# Patient Record
Sex: FEMALE | Race: WHITE | NOT HISPANIC OR LATINO | Employment: OTHER | URBAN - METROPOLITAN AREA
[De-identification: names, ages, dates, MRNs, and addresses within clinical notes are randomized per-mention and may not be internally consistent; named-entity substitution may affect disease eponyms.]

---

## 2017-12-21 ENCOUNTER — HOSPITAL ENCOUNTER (OUTPATIENT)
Dept: RADIOLOGY | Age: 67
Discharge: HOME | End: 2017-12-21
Payer: MEDICARE

## 2017-12-21 ENCOUNTER — GENERIC CONVERSION - ENCOUNTER (OUTPATIENT)
Dept: OTHER | Facility: OTHER | Age: 67
End: 2017-12-21

## 2017-12-21 DIAGNOSIS — Z12.31 ENCOUNTER FOR SCREENING MAMMOGRAM FOR MALIGNANT NEOPLASM OF BREAST: ICD-10-CM

## 2017-12-21 PROCEDURE — G0202 SCR MAMMO BI INCL CAD: HCPCS

## 2018-11-30 ENCOUNTER — LAB REQUISITION (OUTPATIENT)
Dept: LAB | Facility: HOSPITAL | Age: 68
End: 2018-11-30
Payer: MEDICARE

## 2018-11-30 DIAGNOSIS — N20.9 URINARY CALCULUS: ICD-10-CM

## 2018-11-30 PROCEDURE — 82360 CALCULUS ASSAY QUANT: CPT | Performed by: UROLOGY

## 2018-11-30 PROCEDURE — 88300 SURGICAL PATH GROSS: CPT | Performed by: PATHOLOGY

## 2018-12-12 LAB
CA PHOS MFR STONE: 15 %
CALCIUM OXALATE DIHYDRATE MFR STONE IR: 25 %
COLOR STONE: NORMAL
COM MFR STONE: 60 %
COMMENT-STONE3: NORMAL
COMPOSITION: NORMAL
LABORATORY COMMENT REPORT: NORMAL
NIDUS STONE QL: NORMAL
PHOTO: NORMAL
SIZE STONE: NORMAL MM
STONE ANALYSIS-IMP: NORMAL
WT STONE: 101.8 MG

## 2019-01-03 ENCOUNTER — HOSPITAL ENCOUNTER (OUTPATIENT)
Dept: RADIOLOGY | Age: 69
Discharge: HOME | End: 2019-01-03
Payer: MEDICARE

## 2019-01-03 VITALS — WEIGHT: 150 LBS | HEIGHT: 63 IN | BODY MASS INDEX: 26.58 KG/M2

## 2019-01-03 DIAGNOSIS — Z12.31 ENCOUNTER FOR SCREENING MAMMOGRAM FOR MALIGNANT NEOPLASM OF BREAST: ICD-10-CM

## 2019-01-03 PROCEDURE — 77067 SCR MAMMO BI INCL CAD: CPT

## 2019-07-09 RX ORDER — LANOLIN ALCOHOL/MO/W.PET/CERES
1 CREAM (GRAM) TOPICAL
COMMUNITY

## 2019-07-09 RX ORDER — DIPHENOXYLATE HYDROCHLORIDE AND ATROPINE SULFATE 2.5; .025 MG/1; MG/1
1 TABLET ORAL
COMMUNITY

## 2019-07-09 RX ORDER — CYCLOSPORINE 0.5 MG/ML
1 EMULSION OPHTHALMIC
COMMUNITY

## 2019-08-07 ENCOUNTER — OFFICE VISIT (OUTPATIENT)
Dept: OBGYN CLINIC | Facility: CLINIC | Age: 69
End: 2019-08-07
Payer: MEDICARE

## 2019-08-07 VITALS
DIASTOLIC BLOOD PRESSURE: 70 MMHG | WEIGHT: 154 LBS | SYSTOLIC BLOOD PRESSURE: 142 MMHG | BODY MASS INDEX: 28.34 KG/M2 | HEIGHT: 62 IN

## 2019-08-07 DIAGNOSIS — Z01.419 ENCOUNTER FOR GYNECOLOGICAL EXAMINATION (GENERAL) (ROUTINE) WITHOUT ABNORMAL FINDINGS: ICD-10-CM

## 2019-08-07 DIAGNOSIS — Z12.31 ENCOUNTER FOR SCREENING MAMMOGRAM FOR MALIGNANT NEOPLASM OF BREAST: ICD-10-CM

## 2019-08-07 DIAGNOSIS — Z91.89 GYN EXAM FOR HIGH-RISK MEDICARE PATIENT: Primary | ICD-10-CM

## 2019-08-07 DIAGNOSIS — C54.1 ENDOMETRIAL CANCER (HCC): ICD-10-CM

## 2019-08-07 PROCEDURE — G0101 CA SCREEN;PELVIC/BREAST EXAM: HCPCS | Performed by: OBSTETRICS & GYNECOLOGY

## 2019-08-07 RX ORDER — HYDROCORTISONE 1 %
2000 OINTMENT (GRAM) TOPICAL DAILY
COMMUNITY

## 2019-08-07 RX ORDER — MOMETASONE FUROATE 50 UG/1
SPRAY, METERED NASAL
Refills: 3 | COMMUNITY
Start: 2019-05-15

## 2019-08-07 NOTE — PROGRESS NOTES
Assessment/Plan:    Encounter for gynecological examination (general) (routine) without abnormal findings   -  Pap no longer indicated    Encounter for screening mammogram for malignant neoplasm of breast  -     Mammo screening bilateral w cad; Future    Osteopenia  -   Reviewed calcium and Vit D dosing as well as weight bearing exercise  Advise follow up 2y from last scan    Subjective:      Patient ID: Tawana Darby is a 70 y o  female  68yo here for annual exam   Last seen at [facility] in 2018  Personal history of endometrial cancer - treated by Dr Mara Elizondo with Hysterectomy and BSO  She was cleared for routine gynecologic care and follow  Stage 1A, grade 1  History of abnormal pap - follows up normal, and per [facility] notes no further indicated at this time  She is not having any bleeding or vaginal discharge  She has no pelvic pain  She is not sexually active  She has no urinary concerns  She has no bowel concerns  She has no breast concerns  She has been treated for kidney stones in this past year  Had stents placed  Two children, grandson and granddaughter  The following portions of the patient's history were reviewed and updated as appropriate: allergies, current medications, past family history, past medical history, past social history, past surgical history and problem list     Review of Systems   Gastrointestinal: Negative for abdominal pain, constipation and diarrhea  Genitourinary: Negative for difficulty urinating, dysuria, pelvic pain, vaginal bleeding, vaginal discharge and vaginal pain  Objective:      /70 (BP Location: Left arm, Patient Position: Sitting, Cuff Size: Large)   Ht 5' 2" (1 575 m)   Wt 69 9 kg (154 lb)   LMP  (LMP Unknown)   BMI 28 17 kg/m²          Physical Exam   Constitutional: She appears well-developed and well-nourished  No distress  Pulmonary/Chest: No respiratory distress   Right breast exhibits no inverted nipple, no mass, no nipple discharge, no skin change and no tenderness  Left breast exhibits no inverted nipple, no mass, no nipple discharge, no skin change and no tenderness  Abdominal: Soft  There is no tenderness  Genitourinary: Vagina normal  There is no rash or lesion on the right labia  There is no rash or lesion on the left labia  Genitourinary Comments: Cervix:  Surgically Absent  Uterus:  Surgically Absent  Adnexa:  Surgically Absent    No pelvic masses appreciated   Neurological: She is alert  Skin: Skin is warm and dry  Psychiatric: She has a normal mood and affect  Vitals reviewed

## 2019-12-28 ENCOUNTER — HOSPITAL ENCOUNTER (EMERGENCY)
Facility: HOSPITAL | Age: 69
Discharge: HOME | End: 2019-12-28
Attending: EMERGENCY MEDICINE
Payer: MEDICARE

## 2019-12-28 VITALS
SYSTOLIC BLOOD PRESSURE: 142 MMHG | HEIGHT: 62 IN | OXYGEN SATURATION: 96 % | BODY MASS INDEX: 27.6 KG/M2 | HEART RATE: 83 BPM | DIASTOLIC BLOOD PRESSURE: 84 MMHG | RESPIRATION RATE: 20 BRPM | TEMPERATURE: 98.4 F | WEIGHT: 150 LBS

## 2019-12-28 DIAGNOSIS — M62.838 TRAPEZIUS MUSCLE SPASM: ICD-10-CM

## 2019-12-28 DIAGNOSIS — S16.1XXA ACUTE STRAIN OF NECK MUSCLE, INITIAL ENCOUNTER: Primary | ICD-10-CM

## 2019-12-28 DIAGNOSIS — J06.9 UPPER RESPIRATORY TRACT INFECTION, UNSPECIFIED TYPE: ICD-10-CM

## 2019-12-28 PROCEDURE — 99284 EMERGENCY DEPT VISIT MOD MDM: CPT | Performed by: EMERGENCY MEDICINE

## 2019-12-28 PROCEDURE — 99283 EMERGENCY DEPT VISIT LOW MDM: CPT

## 2019-12-28 RX ORDER — DIAZEPAM 5 MG/1
5 TABLET ORAL EVERY 8 HOURS PRN
Qty: 15 TABLET | Refills: 0 | Status: SHIPPED | OUTPATIENT
Start: 2019-12-28 | End: 2020-09-03 | Stop reason: ALTCHOICE

## 2019-12-28 RX ORDER — NAPROXEN 500 MG/1
500 TABLET ORAL 2 TIMES DAILY PRN
Qty: 20 TABLET | Refills: 0 | Status: SHIPPED | OUTPATIENT
Start: 2019-12-28 | End: 2020-09-03 | Stop reason: ALTCHOICE

## 2019-12-28 NOTE — ED PROVIDER NOTES
History  Chief Complaint   Patient presents with    Neck Pain     Pt  presents to the ED with complaints of a stiff neck since before Christmas as well as sinus congestion  Pt  has had worsening symptoms  40-year-old female, 2 separate complaints, right-sided neck pain, URI symptoms  URI symptoms have been for 3 days, neck pain for 2 days  , was called, states her arms were held up high on a table when she was in the low chair, this was an on position for her she did this for multiple hours  , later that night noticed a soreness on the right side of her neck and her upper arms  , this significantly worsened over the next couple of days  , the setting of also having a URI patient was concerned about meningitis  , this is but mainly prompted the ED visit  , patient scratch neck pain as dull, constant, worse with rotation to the right, no worsening with flexion or extension, no light sensitivity no headache , no other modifying or alleviating factors  Has not tried any over-the-counter remedies  As per the URI could patient describes nasal congestion a mild sore throat, has been for past 3 days, was worse for the 1st 2 days, much better today  , never had any fevers  For this she has been using DayQuil and NyQuil  Did not use any DayQuil today, last use NyQuil last night          Prior to Admission Medications   Prescriptions Last Dose Informant Patient Reported? Taking?    Calcium Citrate-Vitamin D (CALCIUM CITRATE + D PO)   Yes No   Sig: Take by mouth   Omega-3 Fatty Acids (FISH OIL PO)   Yes No   Sig: Take 2 g by mouth   SM VITAMIN D3 4000 units CAPS  Self Yes No   Sig: Take by mouth   cycloSPORINE (RESTASIS) 0 05 % ophthalmic emulsion   Yes No   Sig: Apply 1 drop to eye   glucosamine-chondroitin 500-400 MG tablet   Yes No   Sig: Take 1 tablet by mouth   mometasone (NASONEX) 50 mcg/act nasal spray   Yes No   Sig: SHAKE LQ AND U 2 SPRAYS IEN D   multivitamin (THERAGRAN) TABS   Yes No   Sig: Take 1 tablet by mouth Facility-Administered Medications: None       Past Medical History:   Diagnosis Date    Achilles tendon tear ,2018    bilateral    Anxiety     Depression     HPV (human papilloma virus) infection     Kidney stones     renal calculi    Osteoporosis     Uterine cancer ([address] ) 2014    UTI (urinary tract infection)        Past Surgical History:   Procedure Laterality Date     SECTION  1002,1318    HYSTERECTOMY  2014    OOPHORECTOMY Bilateral 2014    TENDON REPAIR  7524,4548    Archilles tendon repair-bilateral       Family History   Adopted: Yes   Problem Relation Age of Onset    Breast cancer Daughter 39    Parkinsonism Other     Seizures Other      I have reviewed and agree with the history as documented  Social History     Tobacco Use    Smoking status: Never Smoker    Smokeless tobacco: Never Used   Substance Use Topics    Alcohol use: Yes     Alcohol/week: 2 0 standard drinks     Types: 1 Glasses of wine, 1 Cans of beer per week     Frequency: Monthly or less     Binge frequency: Less than monthly    Drug use: Never        Review of Systems   Constitutional: Negative for activity change, chills, diaphoresis and fever  HENT: Positive for congestion, postnasal drip and sore throat  Negative for sinus pressure  Eyes: Negative for pain and visual disturbance  Respiratory: Negative for cough, chest tightness, shortness of breath, wheezing and stridor  Cardiovascular: Negative for chest pain and palpitations  Gastrointestinal: Negative for abdominal distention, abdominal pain, constipation, diarrhea, nausea and vomiting  Genitourinary: Negative for dysuria and frequency  Musculoskeletal: Negative for neck pain and neck stiffness  Skin: Negative for rash  Neurological: Negative for dizziness, speech difficulty, light-headedness, numbness and headaches  Physical Exam  Physical Exam   Constitutional: She is oriented to person, place, and time   She appears well-developed  No distress  HENT:   Head: Normocephalic and atraumatic  TMs clear bilaterally mild nasal congestion, no posterior pharyngeal erythema no evidence peritonsillar abscess   Eyes: Pupils are equal, round, and reactive to light  Neck: Normal range of motion  Neck supple  No tracheal deviation present  No signs of meningismus slight decreased range of motion with rotation to the right  No trouble flexion extension   Cardiovascular: Normal rate, regular rhythm, normal heart sounds and intact distal pulses  No murmur heard  Pulmonary/Chest: Effort normal and breath sounds normal  No stridor  No respiratory distress  Abdominal: Soft  She exhibits no distension  There is no tenderness  There is no rebound and no guarding  Musculoskeletal: Normal range of motion  Neurological: She is alert and oriented to person, place, and time  Skin: Skin is warm and dry  She is not diaphoretic  No erythema  No pallor  Psychiatric: She has a normal mood and affect  Vitals reviewed        Vital Signs  ED Triage Vitals [12/28/19 1311]   Temperature Pulse Respirations Blood Pressure SpO2   98 4 °F (36 9 °C) 83 20 142/84 96 %      Temp Source Heart Rate Source Patient Position - Orthostatic VS BP Location FiO2 (%)   Oral Monitor Sitting Left arm --      Pain Score       6           Vitals:    12/28/19 1311   BP: 142/84   Pulse: 83   Patient Position - Orthostatic VS: Sitting         Visual Acuity      ED Medications  Medications - No data to display    Diagnostic Studies  Results Reviewed     None                 No orders to display              Procedures  Procedures         ED Course                               MDM  Number of Diagnoses or Management Options  Acute strain of neck muscle, initial encounter: new and requires workup  Trapezius muscle spasm: new and requires workup  Upper respiratory tract infection, unspecified type: new and requires workup  Diagnosis management comments: Patient muscular neck pain likely from holding her arms in a overly extended position for a long period distal time wa quilting , in the setting of also having a URI  I do not believe this represents meningitis  Will treat with Naprosyn and Valium       Amount and/or Complexity of Data Reviewed  Review and summarize past medical records: yes          Disposition  Final diagnoses:   Trapezius muscle spasm   Acute strain of neck muscle, initial encounter   Upper respiratory tract infection, unspecified type     Time reflects when diagnosis was documented in both MDM as applicable and the Disposition within this note     Time User Action Codes Description Comment    12/28/2019  2:08 PM Jorge Doe Add [W26 635] Trapezius muscle spasm     12/28/2019  2:08 PM Jorge Doe Add Leif Cisco  1XXA] Acute strain of neck muscle, initial encounter     12/28/2019  2:08 PM Yanique MCKEON Add [J06 9] Upper respiratory tract infection, unspecified type     12/28/2019  2:09 PM Jorge Doe Modify [Y90 491] Trapezius muscle spasm     12/28/2019  2:09 PM Jorge Doe Modify [D24  1XXA] Acute strain of neck muscle, initial encounter       ED Disposition     ED Disposition Condition Date/Time Comment    Discharge Stable Sat Dec 28, 2019  2:08 PM Teri Marina discharge to home/self care              Follow-up Information    None         Discharge Medication List as of 12/28/2019  2:09 PM      START taking these medications    Details   diazepam (VALIUM) 5 mg tablet Take 1 tablet (5 mg total) by mouth every 8 (eight) hours as needed for muscle spasms for up to 10 days, Starting Sat 12/28/2019, Until Tue 1/7/2020, Normal      naproxen (NAPROSYN) 500 mg tablet Take 1 tablet (500 mg total) by mouth 2 (two) times a day as needed for mild pain, Starting Sat 12/28/2019, Normal         CONTINUE these medications which have NOT CHANGED    Details   Calcium Citrate-Vitamin D (CALCIUM CITRATE + D PO) Take by mouth, Historical Med      cycloSPORINE (RESTASIS) 0 05 % ophthalmic emulsion Apply 1 drop to eye, Historical Med      glucosamine-chondroitin 500-400 MG tablet Take 1 tablet by mouth, Historical Med      mometasone (NASONEX) 50 mcg/act nasal spray SHAKE LQ AND U 2 SPRAYS IEN D, Historical Med      multivitamin (THERAGRAN) TABS Take 1 tablet by mouth, Historical Med      Omega-3 Fatty Acids (FISH OIL PO) Take 2 g by mouth, Historical Med      SM VITAMIN D3 4000 units CAPS Take by mouth, Historical Med           No discharge procedures on file      ED Provider  Electronically Signed by           Rishabh Long DO  12/28/19 6725

## 2020-02-28 ENCOUNTER — TELEPHONE (OUTPATIENT)
Dept: OBGYN CLINIC | Facility: CLINIC | Age: 70
End: 2020-02-28

## 2020-02-28 NOTE — TELEPHONE ENCOUNTER
Spoke to pt reminding her to call and set up her mammo  She states she will do next week as she is out of town right now

## 2020-06-12 ENCOUNTER — TRANSCRIBE ORDERS (OUTPATIENT)
Dept: ADMINISTRATIVE | Facility: HOSPITAL | Age: 70
End: 2020-06-12

## 2020-06-12 DIAGNOSIS — Z12.31 VISIT FOR SCREENING MAMMOGRAM: Primary | ICD-10-CM

## 2020-07-01 ENCOUNTER — HOSPITAL ENCOUNTER (OUTPATIENT)
Dept: MAMMOGRAPHY | Facility: HOSPITAL | Age: 70
Discharge: HOME | End: 2020-07-01
Payer: MEDICARE

## 2020-07-01 ENCOUNTER — TELEPHONE (OUTPATIENT)
Dept: OBGYN CLINIC | Facility: CLINIC | Age: 70
End: 2020-07-01

## 2020-07-01 VITALS — HEIGHT: 62 IN | BODY MASS INDEX: 27.6 KG/M2 | WEIGHT: 150 LBS

## 2020-07-01 DIAGNOSIS — M85.80 OSTEOPENIA, UNSPECIFIED LOCATION: Primary | ICD-10-CM

## 2020-07-01 DIAGNOSIS — Z12.31 VISIT FOR SCREENING MAMMOGRAM: ICD-10-CM

## 2020-07-01 PROCEDURE — 77067 SCR MAMMO BI INCL CAD: CPT

## 2020-07-01 PROCEDURE — 77063 BREAST TOMOSYNTHESIS BI: CPT

## 2020-07-01 NOTE — TELEPHONE ENCOUNTER
Patient had a osteopenia screening in 2018 with [facility] and wanted to know should she go again this year

## 2020-07-02 NOTE — TELEPHONE ENCOUNTER
Patient insisted on having dexa order placed today so she can go in August  Talked to Dr Felisha torres okay with order being placed  Patient aware

## 2020-08-07 ENCOUNTER — HOSPITAL ENCOUNTER (OUTPATIENT)
Dept: BONE DENSITY | Facility: HOSPITAL | Age: 70
Discharge: HOME | End: 2020-08-07
Payer: MEDICARE

## 2020-08-07 DIAGNOSIS — M85.80 OSTEOPENIA, UNSPECIFIED LOCATION: ICD-10-CM

## 2020-08-07 PROCEDURE — 77080 DXA BONE DENSITY AXIAL: CPT

## 2020-09-03 ENCOUNTER — ANNUAL EXAM (OUTPATIENT)
Dept: OBGYN CLINIC | Facility: CLINIC | Age: 70
End: 2020-09-03
Payer: MEDICARE

## 2020-09-03 VITALS
TEMPERATURE: 97.8 F | SYSTOLIC BLOOD PRESSURE: 142 MMHG | WEIGHT: 151.2 LBS | BODY MASS INDEX: 26.79 KG/M2 | DIASTOLIC BLOOD PRESSURE: 72 MMHG | HEIGHT: 63 IN

## 2020-09-03 DIAGNOSIS — Z01.419 ENCOUNTER FOR GYNECOLOGICAL EXAMINATION (GENERAL) (ROUTINE) WITHOUT ABNORMAL FINDINGS: ICD-10-CM

## 2020-09-03 DIAGNOSIS — Z12.31 ENCOUNTER FOR SCREENING MAMMOGRAM FOR MALIGNANT NEOPLASM OF BREAST: ICD-10-CM

## 2020-09-03 DIAGNOSIS — Z91.89 GYN EXAM FOR HIGH-RISK MEDICARE PATIENT: Primary | ICD-10-CM

## 2020-09-03 PROCEDURE — G0101 CA SCREEN;PELVIC/BREAST EXAM: HCPCS | Performed by: OBSTETRICS & GYNECOLOGY

## 2020-09-03 RX ORDER — CARBOXYMETHYLCELLULOSE SODIUM 5 MG/ML
1 SOLUTION/ DROPS OPHTHALMIC 3 TIMES DAILY PRN
COMMUNITY

## 2020-09-03 RX ORDER — BRIMONIDINE TARTRATE 0.1 %
DROPS OPHTHALMIC (EYE)
COMMUNITY
Start: 2020-07-30 | End: 2022-06-23

## 2020-09-03 NOTE — PROGRESS NOTES
Ivis Mary   1950    CC:  Yearly exam    S:  78 y o  female here for yearly exam  She is s/p hysterectomy for endometrial cancer  She is doing well and has no concerns today  She denies vaginal bleeding  She denies vaginal discharge, itching, odor or dryness  She is not sexually active without pain, bleeding or dryness      Last Pap 7/11/18 - Negative   Last Mammo 7/1/20 - BIRad 1  Last Colonoscopy 1/2/16 - Polyp, 5yr recall   Last DEXA 8/7/20 - osteopenia    Family hx of breast cancer:  Daughter  Family hx of ovarian cancer: none known  Family hx of colon cancer:  None known      Current Outpatient Medications:     Alphagan P 0 1 %, INT 1 GTT IN OS BID, Disp: , Rfl:     Calcium Citrate-Vitamin D (CALCIUM CITRATE + D PO), Take by mouth, Disp: , Rfl:     carboxymethylcellulose 0 5 % SOLN, 1 drop 3 (three) times a day as needed for dry eyes, Disp: , Rfl:     cycloSPORINE (RESTASIS) 0 05 % ophthalmic emulsion, Apply 1 drop to eye, Disp: , Rfl:     glucosamine-chondroitin 500-400 MG tablet, Take 1 tablet by mouth, Disp: , Rfl:     multivitamin (THERAGRAN) TABS, Take 1 tablet by mouth, Disp: , Rfl:     Omega-3 Fatty Acids (FISH OIL PO), Take 2 g by mouth, Disp: , Rfl:     SM VITAMIN D3 4000 units CAPS, Take by mouth, Disp: , Rfl:     mometasone (NASONEX) 50 mcg/act nasal spray, SHAKE LQ AND U 2 SPRAYS IEN D, Disp: , Rfl: 3  Social History     Socioeconomic History    Marital status: /Civil Union     Spouse name: Not on file    Number of children: Not on file    Years of education: Not on file    Highest education level: Not on file   Occupational History    Not on file   Social Needs    Financial resource strain: Not on file    Food insecurity     Worry: Not on file     Inability: Not on file    Transportation needs     Medical: Not on file     Non-medical: Not on file   Tobacco Use    Smoking status: Never Smoker    Smokeless tobacco: Never Used   Substance and Sexual Activity    Alcohol use: Yes     Alcohol/week: 2 0 standard drinks     Types: 1 Glasses of wine, 1 Cans of beer per week     Frequency: Monthly or less     Binge frequency: Less than monthly    Drug use: Never    Sexual activity: Not Currently     Partners: Male     Birth control/protection: Post-menopausal   Lifestyle    Physical activity     Days per week: Not on file     Minutes per session: Not on file    Stress: Not on file   Relationships    Social connections     Talks on phone: Not on file     Gets together: Not on file     Attends Druze service: Not on file     Active member of club or organization: Not on file     Attends meetings of clubs or organizations: Not on file     Relationship status: Not on file    Intimate partner violence     Fear of current or ex partner: Not on file     Emotionally abused: Not on file     Physically abused: Not on file     Forced sexual activity: Not on file   Other Topics Concern    Not on file   Social History Narrative    Not on file     Family History   Adopted: Yes   Problem Relation Age of Onset    Breast cancer Daughter 39    Parkinsonism Other     Seizures Other      Past Medical History:   Diagnosis Date    Achilles tendon tear 2014,2018    bilateral    Anxiety     Depression     HPV (human papilloma virus) infection     Kidney stones     renal calculi    Osteoporosis     [facility]    UTI (urinary tract infection)         Review of Systems   Respiratory: Negative  Cardiovascular: Negative  Gastrointestinal: Negative for constipation and diarrhea  Genitourinary: Negative for difficulty urinating, pelvic pain, vaginal bleeding, vaginal discharge, itching, or odor  O:  Blood pressure 142/72, temperature 97 8 °F (36 6 °C), temperature source Tympanic, height 5' 2 5" (1 588 m), weight 68 6 kg (151 lb 3 2 oz)      Patient appears well and is not in distress  Breasts are symmetrical without mass, tenderness, nipple discharge, skin changes or adenopathy  Abdomen is soft and nontender without masses  External genitals are normal without lesions or rashes  Urethral meatus and urethra are normal  Vagina is normal without discharge or bleeding  Cervix and uterus are surgically absent  Adnexa are surgically absent    A:   Yearly exam      P:   Mammo slip provided   RTO one year for yearly exam or sooner as needed

## 2021-03-09 DIAGNOSIS — Z23 ENCOUNTER FOR IMMUNIZATION: ICD-10-CM

## 2021-03-10 ENCOUNTER — IMMUNIZATIONS (OUTPATIENT)
Dept: FAMILY MEDICINE CLINIC | Facility: HOSPITAL | Age: 71
End: 2021-03-10

## 2021-03-10 DIAGNOSIS — Z23 ENCOUNTER FOR IMMUNIZATION: Primary | ICD-10-CM

## 2021-03-10 PROCEDURE — 0011A SARS-COV-2 / COVID-19 MRNA VACCINE (MODERNA) 100 MCG: CPT

## 2021-03-10 PROCEDURE — 91301 SARS-COV-2 / COVID-19 MRNA VACCINE (MODERNA) 100 MCG: CPT

## 2021-04-07 ENCOUNTER — IMMUNIZATIONS (OUTPATIENT)
Dept: FAMILY MEDICINE CLINIC | Facility: HOSPITAL | Age: 71
End: 2021-04-07

## 2021-04-07 DIAGNOSIS — Z23 ENCOUNTER FOR IMMUNIZATION: Primary | ICD-10-CM

## 2021-04-07 PROCEDURE — 91301 SARS-COV-2 / COVID-19 MRNA VACCINE (MODERNA) 100 MCG: CPT

## 2021-04-07 PROCEDURE — 0012A SARS-COV-2 / COVID-19 MRNA VACCINE (MODERNA) 100 MCG: CPT

## 2021-05-13 ENCOUNTER — APPOINTMENT (OUTPATIENT)
Dept: LAB | Facility: MEDICAL CENTER | Age: 71
End: 2021-05-13
Payer: MEDICARE

## 2021-05-13 ENCOUNTER — TRANSCRIBE ORDERS (OUTPATIENT)
Dept: LAB | Facility: MEDICAL CENTER | Age: 71
End: 2021-05-13

## 2021-05-13 DIAGNOSIS — I10 ESSENTIAL HYPERTENSION, MALIGNANT: Primary | ICD-10-CM

## 2021-05-13 DIAGNOSIS — I10 ESSENTIAL HYPERTENSION, MALIGNANT: ICD-10-CM

## 2021-05-13 DIAGNOSIS — E78.2 MIXED HYPERLIPIDEMIA: ICD-10-CM

## 2021-05-13 DIAGNOSIS — I51.9 MYXEDEMA HEART DISEASE: ICD-10-CM

## 2021-05-13 DIAGNOSIS — R35.0 URINARY FREQUENCY: ICD-10-CM

## 2021-05-13 DIAGNOSIS — E03.9 MYXEDEMA HEART DISEASE: ICD-10-CM

## 2021-05-13 DIAGNOSIS — D64.9 ANEMIA, UNSPECIFIED TYPE: ICD-10-CM

## 2021-05-13 LAB
ALBUMIN SERPL BCP-MCNC: 3.7 G/DL (ref 3.5–5)
ALP SERPL-CCNC: 86 U/L (ref 46–116)
ALT SERPL W P-5'-P-CCNC: 34 U/L (ref 12–78)
ANION GAP SERPL CALCULATED.3IONS-SCNC: 4 MMOL/L (ref 4–13)
AST SERPL W P-5'-P-CCNC: 18 U/L (ref 5–45)
BASOPHILS # BLD AUTO: 0.03 THOUSANDS/ΜL (ref 0–0.1)
BASOPHILS NFR BLD AUTO: 1 % (ref 0–1)
BILIRUB SERPL-MCNC: 0.79 MG/DL (ref 0.2–1)
BILIRUB UR QL STRIP: NEGATIVE
BUN SERPL-MCNC: 17 MG/DL (ref 5–25)
CALCIUM SERPL-MCNC: 9.1 MG/DL (ref 8.3–10.1)
CHLORIDE SERPL-SCNC: 106 MMOL/L (ref 100–108)
CHOLEST SERPL-MCNC: 233 MG/DL (ref 50–200)
CLARITY UR: CLEAR
CO2 SERPL-SCNC: 29 MMOL/L (ref 21–32)
COLOR UR: YELLOW
CREAT SERPL-MCNC: 0.56 MG/DL (ref 0.6–1.3)
EOSINOPHIL # BLD AUTO: 0.05 THOUSAND/ΜL (ref 0–0.61)
EOSINOPHIL NFR BLD AUTO: 1 % (ref 0–6)
ERYTHROCYTE [DISTWIDTH] IN BLOOD BY AUTOMATED COUNT: 13.4 % (ref 11.6–15.1)
GFR SERPL CREATININE-BSD FRML MDRD: 94 ML/MIN/1.73SQ M
GLUCOSE P FAST SERPL-MCNC: 98 MG/DL (ref 65–99)
GLUCOSE UR STRIP-MCNC: NEGATIVE MG/DL
HCT VFR BLD AUTO: 42.9 % (ref 34.8–46.1)
HDLC SERPL-MCNC: 76 MG/DL
HGB BLD-MCNC: 14 G/DL (ref 11.5–15.4)
HGB UR QL STRIP.AUTO: NEGATIVE
IMM GRANULOCYTES # BLD AUTO: 0.01 THOUSAND/UL (ref 0–0.2)
IMM GRANULOCYTES NFR BLD AUTO: 0 % (ref 0–2)
KETONES UR STRIP-MCNC: NEGATIVE MG/DL
LDLC SERPL CALC-MCNC: 141 MG/DL (ref 0–100)
LDLC SERPL DIRECT ASSAY-MCNC: 144 MG/DL (ref 0–100)
LEUKOCYTE ESTERASE UR QL STRIP: NEGATIVE
LYMPHOCYTES # BLD AUTO: 1.87 THOUSANDS/ΜL (ref 0.6–4.47)
LYMPHOCYTES NFR BLD AUTO: 37 % (ref 14–44)
MCH RBC QN AUTO: 28.9 PG (ref 26.8–34.3)
MCHC RBC AUTO-ENTMCNC: 32.6 G/DL (ref 31.4–37.4)
MCV RBC AUTO: 89 FL (ref 82–98)
MONOCYTES # BLD AUTO: 0.4 THOUSAND/ΜL (ref 0.17–1.22)
MONOCYTES NFR BLD AUTO: 8 % (ref 4–12)
NEUTROPHILS # BLD AUTO: 2.71 THOUSANDS/ΜL (ref 1.85–7.62)
NEUTS SEG NFR BLD AUTO: 53 % (ref 43–75)
NITRITE UR QL STRIP: NEGATIVE
NONHDLC SERPL-MCNC: 157 MG/DL
NRBC BLD AUTO-RTO: 0 /100 WBCS
PH UR STRIP.AUTO: 6.5 [PH]
PLATELET # BLD AUTO: 272 THOUSANDS/UL (ref 149–390)
PMV BLD AUTO: 9.3 FL (ref 8.9–12.7)
POTASSIUM SERPL-SCNC: 4.3 MMOL/L (ref 3.5–5.3)
PROT SERPL-MCNC: 6.9 G/DL (ref 6.4–8.2)
PROT UR STRIP-MCNC: NEGATIVE MG/DL
RBC # BLD AUTO: 4.85 MILLION/UL (ref 3.81–5.12)
SODIUM SERPL-SCNC: 139 MMOL/L (ref 136–145)
SP GR UR STRIP.AUTO: 1.02 (ref 1–1.03)
T4 FREE SERPL-MCNC: 1.11 NG/DL (ref 0.76–1.46)
TRIGL SERPL-MCNC: 79 MG/DL
TSH SERPL DL<=0.05 MIU/L-ACNC: 1.29 UIU/ML (ref 0.36–3.74)
UROBILINOGEN UR QL STRIP.AUTO: 0.2 E.U./DL
WBC # BLD AUTO: 5.07 THOUSAND/UL (ref 4.31–10.16)

## 2021-05-13 PROCEDURE — 85025 COMPLETE CBC W/AUTO DIFF WBC: CPT

## 2021-05-13 PROCEDURE — 81003 URINALYSIS AUTO W/O SCOPE: CPT

## 2021-05-13 PROCEDURE — 84439 ASSAY OF FREE THYROXINE: CPT

## 2021-05-13 PROCEDURE — 80053 COMPREHEN METABOLIC PANEL: CPT

## 2021-05-13 PROCEDURE — 80061 LIPID PANEL: CPT

## 2021-05-13 PROCEDURE — 84443 ASSAY THYROID STIM HORMONE: CPT

## 2021-05-13 PROCEDURE — 83721 ASSAY OF BLOOD LIPOPROTEIN: CPT

## 2021-05-13 PROCEDURE — 36415 COLL VENOUS BLD VENIPUNCTURE: CPT

## 2021-06-02 DIAGNOSIS — E78.5 HYPERLIPIDEMIA, UNSPECIFIED HYPERLIPIDEMIA TYPE: Primary | ICD-10-CM

## 2021-06-02 NOTE — TELEPHONE ENCOUNTER
Patient was supposed to take a Low dose statin that was suppose to be sent to go to [facility] in May and was never sent?  Patient due to have lab work and an appt in July to follow up on her statin

## 2021-06-08 RX ORDER — ATORVASTATIN CALCIUM 10 MG/1
10 TABLET, FILM COATED ORAL DAILY
Qty: 90 TABLET | Refills: 1 | Status: SHIPPED | OUTPATIENT
Start: 2021-06-08 | End: 2021-09-13 | Stop reason: SDUPTHER

## 2021-07-20 ENCOUNTER — OFFICE VISIT (OUTPATIENT)
Dept: FAMILY MEDICINE CLINIC | Facility: CLINIC | Age: 71
End: 2021-07-20
Payer: MEDICARE

## 2021-07-20 VITALS
BODY MASS INDEX: 27.11 KG/M2 | DIASTOLIC BLOOD PRESSURE: 78 MMHG | TEMPERATURE: 96 F | SYSTOLIC BLOOD PRESSURE: 148 MMHG | HEIGHT: 63 IN | WEIGHT: 153 LBS

## 2021-07-20 DIAGNOSIS — Z76.89 ENCOUNTER TO ESTABLISH CARE: Primary | ICD-10-CM

## 2021-07-20 DIAGNOSIS — H04.123 DRY EYE SYNDROME OF BOTH EYES: ICD-10-CM

## 2021-07-20 DIAGNOSIS — E78.5 HYPERLIPIDEMIA, UNSPECIFIED HYPERLIPIDEMIA TYPE: ICD-10-CM

## 2021-07-20 PROBLEM — N20.0 NEPHROLITHIASIS: Status: ACTIVE | Noted: 2020-12-15

## 2021-07-20 PROCEDURE — 99214 OFFICE O/P EST MOD 30 MIN: CPT | Performed by: PHYSICIAN ASSISTANT

## 2021-07-20 NOTE — PROGRESS NOTES
Assessment/Plan:    Problem List Items Addressed This Visit        Other    Hyperlipidemia    Relevant Orders    Comprehensive metabolic panel    Lipid panel      Other Visit Diagnoses     Encounter to establish care    -  Primary    Dry eye syndrome of both eyes               Diagnoses and all orders for this visit:    Encounter to establish care    Hyperlipidemia, unspecified hyperlipidemia type  -     Comprehensive metabolic panel; Future  -     Lipid panel; Future    Dry eye syndrome of both eyes        Patient will continue to follow with urology, ophthalmology, and OB/GYN as scheduled  She will be due for repeat labs again in September to recheck cholesterol  She is up to date with routine mammograms and vaccines  She is due for repeat colonoscopy and will call to schedule  Subjective:      Patient ID: Frank Ndiaye is a 69 y o  female  Jennifer Jaramillo is a pleasant 69year old female who is here today to establish care  She follows routinely with gynecology and urology  She has a history of kidney stones  She is also following with ophthalmology for dry eye syndrome  She had her routine labs completed in May, which showed high cholesterol  She was started on atorvastatin  She is due to have labs repeated again in September  She is up to date with mammograms  She is due for a colonoscopy due to history of colon polyps  She follows with Dr Junior Elder and will schedule on her own  She notes she is very active and exercises regularly  She denies any other concerns or problems at this time  The following portions of the patient's history were reviewed and updated as appropriate:   She has a past medical history of Achilles tendon tear (3776,0020), Allergic rhinitis due to pollen, Anxiety, Depression, HPV (human papilloma virus) infection, Hyperlipidemia, Kidney stones, Mass of neck (2015), Osteoporosis, Uterine cancer ([facility] [address] ) (2014), and UTI (urinary tract infection)  ,  does not have any pertinent problems on file  ,   has a past surgical history that includes Oophorectomy (Bilateral, ); Tendon repair (1239,2699);  section (3504,2400); Colonoscopy (); Mammo (historical) (); DXA procedure(historical) (); and Hysterectomy (Bilateral, )  ,  family history includes Breast cancer (age of onset: 39) in her daughter; Parkinsonism in her other; Seizures in her other  She was adopted  ,   reports that she has never smoked  She has never used smokeless tobacco  She reports current alcohol use of about 2 0 standard drinks of alcohol per week  She reports that she does not use drugs  ,  is allergic to cefaclor, cephalosporins, and prednisone     Current Outpatient Medications   Medication Sig Dispense Refill    Alphagan P 0 1 % INT 1 GTT IN OS BID      atorvastatin (LIPITOR) 10 mg tablet Take 1 tablet (10 mg total) by mouth daily 90 tablet 1    Calcium Citrate-Vitamin D (CALCIUM CITRATE + D PO) Take by mouth      carboxymethylcellulose 0 5 % SOLN 1 drop 3 (three) times a day as needed for dry eyes      cycloSPORINE (RESTASIS) 0 05 % ophthalmic emulsion Apply 1 drop to eye      glucosamine-chondroitin 500-400 MG tablet Take 1 tablet by mouth      mometasone (NASONEX) 50 mcg/act nasal spray SHAKE LQ AND U 2 SPRAYS IEN D  3    multivitamin (THERAGRAN) TABS Take 1 tablet by mouth      Omega-3 Fatty Acids (FISH OIL PO) Take 2 g by mouth      SM VITAMIN D3 4000 units CAPS Take 2,000 Units by mouth daily        No current facility-administered medications for this visit  Review of Systems   Constitutional: Negative for chills, diaphoresis, fatigue and fever  HENT: Negative for congestion, ear pain, postnasal drip, rhinorrhea, sneezing, sore throat and trouble swallowing  Eyes: Negative for pain and visual disturbance  Respiratory: Negative for apnea, cough, shortness of breath and wheezing  Cardiovascular: Negative for chest pain and palpitations     Gastrointestinal: Negative for abdominal pain, constipation, diarrhea, nausea and vomiting  Genitourinary: Negative for dysuria and hematuria  Musculoskeletal: Negative for arthralgias, gait problem and myalgias  Neurological: Negative for dizziness, syncope, weakness, light-headedness, numbness and headaches  Psychiatric/Behavioral: Negative for suicidal ideas  The patient is not nervous/anxious  Objective:  Vitals:    07/20/21 0931   BP: 148/78   BP Location: Left arm   Patient Position: Sitting   Cuff Size: Large   Temp: (!) 96 °F (35 6 °C)   TempSrc: Temporal   Weight: 69 4 kg (153 lb)   Height: 5' 2 5" (1 588 m)     Body mass index is 27 54 kg/m²  Physical Exam  Vitals and nursing note reviewed  Constitutional:       Appearance: She is well-developed  HENT:      Head: Normocephalic and atraumatic  Right Ear: Tympanic membrane, ear canal and external ear normal       Left Ear: Tympanic membrane, ear canal and external ear normal       Nose: Nose normal       Mouth/Throat:      Pharynx: No oropharyngeal exudate or posterior oropharyngeal erythema  Eyes:      Pupils: Pupils are equal, round, and reactive to light  Cardiovascular:      Rate and Rhythm: Normal rate and regular rhythm  Heart sounds: Normal heart sounds  No murmur heard  No friction rub  No gallop  Pulmonary:      Effort: Pulmonary effort is normal  No respiratory distress  Breath sounds: Normal breath sounds  No wheezing or rales  Abdominal:      Palpations: Abdomen is soft  Tenderness: There is no abdominal tenderness  Musculoskeletal:         General: No swelling  Normal range of motion  Cervical back: Normal range of motion and neck supple  Right lower leg: No edema  Left lower leg: No edema  Lymphadenopathy:      Cervical: No cervical adenopathy  Skin:     General: Skin is warm and dry  Neurological:      Mental Status: She is alert and oriented to person, place, and time     Psychiatric: Behavior: Behavior normal          Thought Content: Thought content normal          Judgment: Judgment normal          BMI Counseling: Body mass index is 27 54 kg/m²  The BMI is above normal  Nutrition recommendations include decreasing overall calorie intake and 3-5 servings of fruits/vegetables daily  Exercise recommendations include exercising 3-5 times per week

## 2021-08-13 DIAGNOSIS — Z12.31 ENCOUNTER FOR SCREENING MAMMOGRAM FOR BREAST CANCER: Primary | ICD-10-CM

## 2021-08-17 ENCOUNTER — HOSPITAL ENCOUNTER (OUTPATIENT)
Dept: MAMMOGRAPHY | Facility: HOSPITAL | Age: 71
Discharge: HOME | End: 2021-08-17
Payer: MEDICARE

## 2021-08-17 VITALS — HEIGHT: 63 IN | BODY MASS INDEX: 27.11 KG/M2 | WEIGHT: 153 LBS

## 2021-08-17 DIAGNOSIS — Z12.31 ENCOUNTER FOR SCREENING MAMMOGRAM FOR BREAST CANCER: ICD-10-CM

## 2021-08-17 PROCEDURE — 77063 BREAST TOMOSYNTHESIS BI: CPT

## 2021-08-17 PROCEDURE — 77067 SCR MAMMO BI INCL CAD: CPT

## 2021-08-31 ENCOUNTER — TELEPHONE (OUTPATIENT)
Dept: FAMILY MEDICINE CLINIC | Facility: CLINIC | Age: 71
End: 2021-08-31

## 2021-08-31 NOTE — TELEPHONE ENCOUNTER
Pt was in contact with a positive covid patient this past Saturday  She does not have any symptoms but would like to be tested for covid

## 2021-09-01 DIAGNOSIS — Z20.822 EXPOSURE TO COVID-19 VIRUS: Primary | ICD-10-CM

## 2021-09-01 PROCEDURE — U0005 INFEC AGEN DETEC AMPLI PROBE: HCPCS | Performed by: PHYSICIAN ASSISTANT

## 2021-09-01 PROCEDURE — U0003 INFECTIOUS AGENT DETECTION BY NUCLEIC ACID (DNA OR RNA); SEVERE ACUTE RESPIRATORY SYNDROME CORONAVIRUS 2 (SARS-COV-2) (CORONAVIRUS DISEASE [COVID-19]), AMPLIFIED PROBE TECHNIQUE, MAKING USE OF HIGH THROUGHPUT TECHNOLOGIES AS DESCRIBED BY CMS-2020-01-R: HCPCS | Performed by: PHYSICIAN ASSISTANT

## 2021-09-01 NOTE — TELEPHONE ENCOUNTER
Should be tested 3-5 days after exposure  I will put in order  Since she is vaccinated, she dose not need to quarantine, but should wear a mask for 14 days in public places

## 2021-09-02 LAB — SARS-COV-2 RNA RESP QL NAA+PROBE: NEGATIVE

## 2021-09-10 ENCOUNTER — APPOINTMENT (OUTPATIENT)
Dept: LAB | Facility: MEDICAL CENTER | Age: 71
End: 2021-09-10
Payer: MEDICARE

## 2021-09-10 DIAGNOSIS — E78.5 HYPERLIPIDEMIA, UNSPECIFIED HYPERLIPIDEMIA TYPE: ICD-10-CM

## 2021-09-10 LAB
ALBUMIN SERPL BCP-MCNC: 3.6 G/DL (ref 3.5–5)
ALP SERPL-CCNC: 101 U/L (ref 46–116)
ALT SERPL W P-5'-P-CCNC: 33 U/L (ref 12–78)
ANION GAP SERPL CALCULATED.3IONS-SCNC: 2 MMOL/L (ref 4–13)
AST SERPL W P-5'-P-CCNC: 21 U/L (ref 5–45)
BILIRUB SERPL-MCNC: 1.16 MG/DL (ref 0.2–1)
BUN SERPL-MCNC: 13 MG/DL (ref 5–25)
CALCIUM SERPL-MCNC: 8.9 MG/DL (ref 8.3–10.1)
CHLORIDE SERPL-SCNC: 109 MMOL/L (ref 100–108)
CHOLEST SERPL-MCNC: 159 MG/DL (ref 50–200)
CO2 SERPL-SCNC: 29 MMOL/L (ref 21–32)
CREAT SERPL-MCNC: 0.66 MG/DL (ref 0.6–1.3)
GFR SERPL CREATININE-BSD FRML MDRD: 89 ML/MIN/1.73SQ M
GLUCOSE P FAST SERPL-MCNC: 93 MG/DL (ref 65–99)
HDLC SERPL-MCNC: 73 MG/DL
LDLC SERPL CALC-MCNC: 71 MG/DL (ref 0–100)
NONHDLC SERPL-MCNC: 86 MG/DL
POTASSIUM SERPL-SCNC: 4.1 MMOL/L (ref 3.5–5.3)
PROT SERPL-MCNC: 7.2 G/DL (ref 6.4–8.2)
SODIUM SERPL-SCNC: 140 MMOL/L (ref 136–145)
TRIGL SERPL-MCNC: 74 MG/DL

## 2021-09-10 PROCEDURE — 36415 COLL VENOUS BLD VENIPUNCTURE: CPT

## 2021-09-10 PROCEDURE — 80061 LIPID PANEL: CPT

## 2021-09-10 PROCEDURE — 80053 COMPREHEN METABOLIC PANEL: CPT

## 2021-09-13 DIAGNOSIS — E78.5 HYPERLIPIDEMIA, UNSPECIFIED HYPERLIPIDEMIA TYPE: ICD-10-CM

## 2021-09-13 RX ORDER — ATORVASTATIN CALCIUM 10 MG/1
10 TABLET, FILM COATED ORAL DAILY
Qty: 90 TABLET | Refills: 1 | Status: SHIPPED | OUTPATIENT
Start: 2021-09-13 | End: 2022-03-22 | Stop reason: SDUPTHER

## 2021-09-21 ENCOUNTER — ANNUAL EXAM (OUTPATIENT)
Dept: GYNECOLOGY | Facility: CLINIC | Age: 71
End: 2021-09-21
Payer: MEDICARE

## 2021-09-21 VITALS
HEART RATE: 101 BPM | WEIGHT: 154.2 LBS | DIASTOLIC BLOOD PRESSURE: 76 MMHG | BODY MASS INDEX: 27.75 KG/M2 | SYSTOLIC BLOOD PRESSURE: 124 MMHG

## 2021-09-21 DIAGNOSIS — Z85.42 HISTORY OF UTERINE CANCER: Primary | ICD-10-CM

## 2021-09-21 DIAGNOSIS — Z01.411 ENCOUNTER FOR GYNECOLOGICAL EXAMINATION (GENERAL) (ROUTINE) WITH ABNORMAL FINDINGS: ICD-10-CM

## 2021-09-21 DIAGNOSIS — Z12.4 ENCOUNTER FOR PAPANICOLAOU SMEAR FOR CERVICAL CANCER SCREENING: ICD-10-CM

## 2021-09-21 DIAGNOSIS — Z12.31 SCREENING MAMMOGRAM, ENCOUNTER FOR: ICD-10-CM

## 2021-09-21 PROCEDURE — G0145 SCR C/V CYTO,THINLAYER,RESCR: HCPCS | Performed by: OBSTETRICS & GYNECOLOGY

## 2021-09-21 PROCEDURE — G0476 HPV COMBO ASSAY CA SCREEN: HCPCS | Performed by: OBSTETRICS & GYNECOLOGY

## 2021-09-21 PROCEDURE — G0101 CA SCREEN;PELVIC/BREAST EXAM: HCPCS | Performed by: OBSTETRICS & GYNECOLOGY

## 2021-09-21 NOTE — PROGRESS NOTES
Assessment/Plan:    Recommended monthly SBE, annual CBE and annual screening mammo  ASCCP guidelines reviewed and pap with cotesting done today  DEXA and colonoscopy noted to be up to date  The patient denies STI risk factors and declines testing at this time  Reviewed diet/activity recommendations Calcium 3339-0155 mg and Vit D 600-1000 IU daily  Discussed postmenopausal considerations and symptoms to report  Kegel exercises as instructed  RTO in one year for routine annual gyn exam or sooner PRN  Diagnoses and all orders for this visit:    History of uterine cancer    Encounter for gynecological examination (general) (routine) with abnormal findings    Screening mammogram, encounter for  -     Mammo screening bilateral w 3d & cad; Future        Subjective:      Patient ID: Sully Rankin is a 69 y o  female  This new patient presents for routine annual gyn exam   She has a hx of hysterectomy at [facility] for endometrial adenocarcinoma in 2014  Patient states she has been released from oncology  She denies gyn concerns  She denies vaginal bleeding or spotting, VM sx, pelvic pain,  breast concerns, abnormal discharge, bowel/bladder dysfunction, depression/anx  , not sexually active  Pap/HPV up to date and normal, 7/11/18  Mammography up to date and normal, 8/17/21  Osteoporosis screening up to date, osteopenia, 8/7/20  Colonoscopy up to date, 1/21/16  The following portions of the patient's history were reviewed and updated as appropriate: allergies, current medications, past family history, past medical history, past social history, past surgical history and problem list     Review of Systems   Constitutional: Negative  Respiratory: Negative  Cardiovascular: Negative  Gastrointestinal: Negative  Endocrine: Negative  Genitourinary: Negative for dysuria, frequency, pelvic pain, urgency, vaginal bleeding, vaginal discharge and vaginal pain  Musculoskeletal: Negative  Skin: Negative  Neurological: Negative  Psychiatric/Behavioral: Negative  Objective:      /76   Pulse 101   Wt 69 9 kg (154 lb 3 2 oz)   LMP  (LMP Unknown) Comment: hysterectomy 2014  BMI 27 75 kg/m²          Physical Exam  Vitals and nursing note reviewed  Exam conducted with a chaperone present  Constitutional:       Appearance: Normal appearance  She is well-developed  HENT:      Head: Normocephalic and atraumatic  Neck:      Thyroid: No thyroid mass or thyromegaly  Cardiovascular:      Rate and Rhythm: Normal rate and regular rhythm  Heart sounds: Normal heart sounds  Pulmonary:      Effort: Pulmonary effort is normal       Breath sounds: Normal breath sounds  Chest:      Breasts: Breasts are symmetrical          Right: No inverted nipple, mass, nipple discharge, skin change or tenderness  Left: No inverted nipple, mass, nipple discharge, skin change or tenderness  Abdominal:      General: Bowel sounds are normal       Palpations: Abdomen is soft  Tenderness: There is no abdominal tenderness  Hernia: There is no hernia in the left inguinal area or right inguinal area  Genitourinary:     General: Normal vulva  Exam position: Supine  Pubic Area: No rash  Labia:         Right: No rash, tenderness, lesion or injury  Left: No rash, tenderness, lesion or injury  Urethra: No prolapse, urethral pain, urethral swelling or urethral lesion  Vagina: Normal  No signs of injury and foreign body  No vaginal discharge, erythema, tenderness, bleeding, lesions or prolapsed vaginal walls  Adnexa:         Right: No mass, tenderness or fullness  Left: No mass, tenderness or fullness  Rectum: No external hemorrhoid  Comments: Urethra normal without lesions  No bladder tenderness  Cervix, uterus surgically absent, no masses or tenderness on BME  Musculoskeletal:         General: Normal range of motion  Cervical back: Normal range of motion and neck supple  Lymphadenopathy:      Lower Body: No right inguinal adenopathy  No left inguinal adenopathy  Skin:     General: Skin is warm and dry  Neurological:      Mental Status: She is alert and oriented to person, place, and time  Psychiatric:         Speech: Speech normal          Behavior: Behavior normal  Behavior is cooperative

## 2021-09-24 LAB
HPV HR 12 DNA CVX QL NAA+PROBE: NEGATIVE
HPV16 DNA CVX QL NAA+PROBE: NEGATIVE
HPV18 DNA CVX QL NAA+PROBE: NEGATIVE

## 2021-09-28 LAB
LAB AP GYN PRIMARY INTERPRETATION: NORMAL
Lab: NORMAL

## 2021-11-01 ENCOUNTER — IMMUNIZATIONS (OUTPATIENT)
Dept: FAMILY MEDICINE CLINIC | Facility: HOSPITAL | Age: 71
End: 2021-11-01

## 2021-11-01 DIAGNOSIS — Z23 ENCOUNTER FOR IMMUNIZATION: Primary | ICD-10-CM

## 2021-11-04 ENCOUNTER — TELEPHONE (OUTPATIENT)
Dept: SURGERY | Facility: HOSPITAL | Age: 71
End: 2021-11-04

## 2021-11-05 ENCOUNTER — TELEPHONE (OUTPATIENT)
Dept: SURGERY | Facility: HOSPITAL | Age: 71
End: 2021-11-05

## 2021-11-07 ENCOUNTER — ANESTHESIA (OUTPATIENT)
Dept: ANESTHESIOLOGY | Facility: HOSPITAL | Age: 71
End: 2021-11-07

## 2021-11-07 ENCOUNTER — ANESTHESIA EVENT (OUTPATIENT)
Dept: ANESTHESIOLOGY | Facility: HOSPITAL | Age: 71
End: 2021-11-07

## 2021-11-08 ENCOUNTER — ANESTHESIA (OUTPATIENT)
Dept: PERIOP | Facility: HOSPITAL | Age: 71
End: 2021-11-08

## 2021-11-08 ENCOUNTER — ANESTHESIA EVENT (OUTPATIENT)
Dept: PERIOP | Facility: HOSPITAL | Age: 71
End: 2021-11-08

## 2021-11-08 ENCOUNTER — HOSPITAL ENCOUNTER (OUTPATIENT)
Dept: PERIOP | Facility: HOSPITAL | Age: 71
Setting detail: OUTPATIENT SURGERY
Discharge: HOME | End: 2021-11-08
Attending: COLON & RECTAL SURGERY
Payer: MEDICARE

## 2021-11-08 VITALS
TEMPERATURE: 98.6 F | HEIGHT: 63 IN | OXYGEN SATURATION: 96 % | RESPIRATION RATE: 18 BRPM | SYSTOLIC BLOOD PRESSURE: 122 MMHG | BODY MASS INDEX: 26.58 KG/M2 | HEART RATE: 78 BPM | WEIGHT: 150 LBS | DIASTOLIC BLOOD PRESSURE: 64 MMHG

## 2021-11-08 DIAGNOSIS — Z86.010 PERSONAL HISTORY OF COLONIC POLYPS: ICD-10-CM

## 2021-11-08 PROCEDURE — 88305 TISSUE EXAM BY PATHOLOGIST: CPT | Performed by: PATHOLOGY

## 2021-11-08 PROCEDURE — 45385 COLONOSCOPY W/LESION REMOVAL: CPT | Performed by: COLON & RECTAL SURGERY

## 2021-11-08 RX ORDER — SODIUM CHLORIDE, SODIUM LACTATE, POTASSIUM CHLORIDE, CALCIUM CHLORIDE 600; 310; 30; 20 MG/100ML; MG/100ML; MG/100ML; MG/100ML
INJECTION, SOLUTION INTRAVENOUS CONTINUOUS PRN
Status: DISCONTINUED | OUTPATIENT
Start: 2021-11-08 | End: 2021-11-08

## 2021-11-08 RX ORDER — SODIUM CHLORIDE, SODIUM LACTATE, POTASSIUM CHLORIDE, CALCIUM CHLORIDE 600; 310; 30; 20 MG/100ML; MG/100ML; MG/100ML; MG/100ML
125 INJECTION, SOLUTION INTRAVENOUS CONTINUOUS
Status: CANCELLED | OUTPATIENT
Start: 2021-11-08

## 2021-11-08 RX ORDER — ONDANSETRON 2 MG/ML
4 INJECTION INTRAMUSCULAR; INTRAVENOUS ONCE AS NEEDED
Status: DISCONTINUED | OUTPATIENT
Start: 2021-11-08 | End: 2021-11-12 | Stop reason: HOSPADM

## 2021-11-08 RX ORDER — LIDOCAINE HYDROCHLORIDE 10 MG/ML
INJECTION, SOLUTION EPIDURAL; INFILTRATION; INTRACAUDAL; PERINEURAL AS NEEDED
Status: DISCONTINUED | OUTPATIENT
Start: 2021-11-08 | End: 2021-11-08

## 2021-11-08 RX ORDER — PROPOFOL 10 MG/ML
INJECTION, EMULSION INTRAVENOUS AS NEEDED
Status: DISCONTINUED | OUTPATIENT
Start: 2021-11-08 | End: 2021-11-08

## 2021-11-08 RX ADMIN — PROPOFOL 100 MG: 10 INJECTION, EMULSION INTRAVENOUS at 13:09

## 2021-11-08 RX ADMIN — PROPOFOL 50 MG: 10 INJECTION, EMULSION INTRAVENOUS at 13:15

## 2021-11-08 RX ADMIN — SODIUM CHLORIDE, SODIUM LACTATE, POTASSIUM CHLORIDE, AND CALCIUM CHLORIDE: .6; .31; .03; .02 INJECTION, SOLUTION INTRAVENOUS at 13:06

## 2021-11-08 RX ADMIN — LIDOCAINE HYDROCHLORIDE 50 MG: 10 INJECTION, SOLUTION EPIDURAL; INFILTRATION; INTRACAUDAL; PERINEURAL at 13:09

## 2021-11-08 RX ADMIN — PROPOFOL 50 MG: 10 INJECTION, EMULSION INTRAVENOUS at 13:12

## 2021-11-08 RX ADMIN — PROPOFOL 50 MG: 10 INJECTION, EMULSION INTRAVENOUS at 13:19

## 2021-12-15 ENCOUNTER — TELEPHONE (OUTPATIENT)
Dept: GYNECOLOGY | Facility: CLINIC | Age: 71
End: 2021-12-15

## 2022-01-31 ENCOUNTER — TELEPHONE (OUTPATIENT)
Dept: FAMILY MEDICINE CLINIC | Facility: CLINIC | Age: 72
End: 2022-01-31

## 2022-01-31 NOTE — TELEPHONE ENCOUNTER
Patient had test done and was told she has a fatty liver, said she never had issue with this before and wondering if this is due to the atorvastatin that she was put on by you? Please advise

## 2022-01-31 NOTE — TELEPHONE ENCOUNTER
Fatty liver is a result of poor diet and high cholesterol, not from the medication  She would not necessarily have symptoms of a fatty liver  It is usually an incidental finding  I would recommend she continue her atorvastatin and try to follow a low fat diet

## 2022-03-22 DIAGNOSIS — E78.5 HYPERLIPIDEMIA, UNSPECIFIED HYPERLIPIDEMIA TYPE: ICD-10-CM

## 2022-03-22 RX ORDER — ATORVASTATIN CALCIUM 10 MG/1
10 TABLET, FILM COATED ORAL DAILY
Qty: 90 TABLET | Refills: 0 | Status: SHIPPED | OUTPATIENT
Start: 2022-03-22 | End: 2022-05-26

## 2022-05-10 ENCOUNTER — TELEMEDICINE (OUTPATIENT)
Dept: FAMILY MEDICINE CLINIC | Facility: CLINIC | Age: 72
End: 2022-05-10
Payer: MEDICARE

## 2022-05-10 VITALS — WEIGHT: 150 LBS | HEIGHT: 63 IN | BODY MASS INDEX: 26.58 KG/M2

## 2022-05-10 DIAGNOSIS — U07.1 COVID-19: Primary | ICD-10-CM

## 2022-05-10 PROCEDURE — 99213 OFFICE O/P EST LOW 20 MIN: CPT | Performed by: PHYSICIAN ASSISTANT

## 2022-05-10 NOTE — PROGRESS NOTES
COVID-19 Outpatient Progress Note    Assessment/Plan:    Problem List Items Addressed This Visit     None      Visit Diagnoses     COVID-19    -  Primary         Disposition:     Patient has COVID-19 infection  Based off CDC guidelines, they were recommended to isolate for 5 days from the date of the positive test  If they remain asymptomatic, isolation may be ended followed by 5 days of wearing a mask when around othes to minimize risk of infecting others  If they have a fever, continue to stay home until fever resolves for at least 24 hours  Discussed symptom directed medication options with patient  Discussed risks and benefits of different treatment options  Since symptoms are resolving, patient is vaccinated, and she is doing well with symptomatic treatment, we agreed to continue as such  She will notify us of any new or worsening symptoms  Follow-up as needed  I have spent 15 minutes directly with the patient  Greater than 50% of this time was spent in counseling/coordination of care regarding: risks and benefits of treatment options, instructions for management and patient and family education  Encounter provider Dax Abdi PA-C    Provider located at [place] [address]  [address] [address]    Recent Visits  No visits were found meeting these conditions  Showing recent visits within past 7 days and meeting all other requirements  Today's Visits  Date Type Provider Dept   05/10/22 Telemedicine Dax Abdi PA-C [facility]   Showing today's visits and meeting all other requirements  Future Appointments  No visits were found meeting these conditions  Showing future appointments within next 150 days and meeting all other requirements     This virtual check-in was done via [employer] and patient was informed that this is a secure, HIPAA-compliant platform  She agrees to proceed      Patient agrees to participate in a virtual check in via telephone or video visit instead of presenting to the office to address urgent/immediate medical needs  Patient is aware this is a billable service  After connecting through [facility], the patient was identified by name and date of birth  [facility] was informed that this was a telemedicine visit and that the exam was being conducted confidentially over secure lines  My office door was closed  No one else was in the room  V[place] Modesto acknowledged consent and understanding of privacy and security of the telemedicine visit  I informed the patient that I have reviewed her record in Epic and presented the opportunity for her to ask any questions regarding the visit today  The patient agreed to participate  Verification of patient location:  Patient is located in the following state in which I hold an active license: [place]    Subjective:   V[place] Modesto is a 66 y o  female who has been screened for COVID-19  Symptom change since last report: improving  Patient's symptoms include fatigue, nasal congestion, rhinorrhea, cough, diarrhea and myalgias  Patient denies fever, chills, sore throat, anosmia, loss of taste, shortness of breath, chest tightness, abdominal pain, nausea, vomiting and headaches  - Date of symptom onset: 5/8/2022  - Date of positive COVID-19 test: 5/9/2022  Type of test: Home antigen  COVID-19 vaccination status: Fully vaccinated with booster    Maksim Norwood has been staying home and has isolated themselves in her home  She is taking care to not share personal items and is cleaning all surfaces that are touched often, like counters, tabletops, and doorknobs using household cleaning sprays or wipes  She is wearing a mask when she leaves her room  Maksim Norwood is a pleasant 66year old female who is here today for a follow up after testing positive for COVID  She admits that she is starting to feel better each day  She is vaccinated and boosted  She started with symptoms on Sunday   She admits to a cough that is becoming less and less each day  She does have some nasal congestion, runny nose, body aches, and fatigue  She has not had a fever  She has been taking NyQuil, Mucinex, and tylenol as needed  She denies any difficulty breathing  She denies any chest pains or shortness of breath  She has been eating and drinking without difficulty  She did have one loose bowel movement today  She denies any abdominal pain, nausea, or vomiting       Lab Results   Component Value Date    SARSCOV2 Negative 2021     Past Medical History:   Diagnosis Date    Achilles tendon tear ,    bilateral    Allergic rhinitis due to pollen     Anxiety     Depression     HPV (human papilloma virus) infection     Hyperlipidemia     Kidney stones     renal calculi    Mass of neck     Osteoporosis     [facility] ([facility] [address] )     UTI (urinary tract infection)      Past Surgical History:   Procedure Laterality Date     SECTION  3693,3006    COLONOSCOPY  2016    DUE     DXA PROCEDURE (HISTORICAL)  2020    HYSTERECTOMY Bilateral 2014    salpino-ooph    OOPHORECTOMY Bilateral     TENDON REPAIR  5700,6730    Archilles tendon repair-bilateral     Current Outpatient Medications   Medication Sig Dispense Refill    Alphagan P 0 1 % INT 1 GTT IN OS BID      atorvastatin (LIPITOR) 10 mg tablet Take 1 tablet (10 mg total) by mouth daily 90 tablet 0    Calcium Citrate-Vitamin D (CALCIUM CITRATE + D PO) Take by mouth      carboxymethylcellulose 0 5 % SOLN 1 drop 3 (three) times a day as needed for dry eyes        cycloSPORINE (RESTASIS) 0 05 % ophthalmic emulsion Apply 1 drop to eye      glucosamine-chondroitin 500-400 MG tablet Take 1 tablet by mouth      mometasone (NASONEX) 50 mcg/act nasal spray SHAKE LQ AND U 2 SPRAYS IEN D  3    multivitamin (THERAGRAN) TABS Take 1 tablet by mouth      Omega-3 Fatty Acids (FISH OIL PO) Take 2 g by mouth      SM VITAMIN D3 4000 units CAPS Take 2,000 Units by mouth daily        No current facility-administered medications for this visit  Allergies   Allergen Reactions    Cefaclor Other (See Comments)     swelling and itching of hands and feet    Cephalosporins Itching    Prednisone Other (See Comments)     disconnected feeling       Review of Systems   Constitutional: Positive for diaphoresis and fatigue  Negative for chills and fever  HENT: Positive for congestion and rhinorrhea  Negative for ear pain, postnasal drip, sneezing, sore throat and trouble swallowing  Eyes: Negative for pain and visual disturbance  Respiratory: Positive for cough  Negative for apnea, chest tightness, shortness of breath and wheezing  Cardiovascular: Negative for chest pain and palpitations  Gastrointestinal: Positive for diarrhea  Negative for abdominal pain, constipation, nausea and vomiting  Genitourinary: Negative for dysuria  Musculoskeletal: Positive for myalgias  Negative for arthralgias and gait problem  Neurological: Negative for dizziness, syncope, weakness, light-headedness, numbness and headaches  Psychiatric/Behavioral: Negative for suicidal ideas  The patient is not nervous/anxious  Objective:    Vitals:    05/10/22 1419   Weight: 68 kg (150 lb)   Height: 5' 2 5" (1 588 m)       Physical Exam  Vitals and nursing note reviewed  Constitutional:       General: She is not in acute distress  Appearance: She is well-developed  She is not ill-appearing, toxic-appearing or diaphoretic  HENT:      Head: Normocephalic and atraumatic  Pulmonary:      Effort: Pulmonary effort is normal  No respiratory distress (Patient is speaking in full, fluent sentences  She does not appear in any acute distress  She does display an occasional cough)  Neurological:      Mental Status: She is alert  Psychiatric:         Behavior: Behavior normal  Behavior is cooperative  Thought Content:  Thought content normal          Judgment: Judgment normal          VIRTUAL VISIT DISCLAIMER    Beatriz Nur verbally agrees to participate in [employer]  Pt is aware that [employer] could be limited without vital signs or the ability to perform a full hands-on physical Janna Lawrence understands she or the provider may request at any time to terminate the video visit and request the patient to seek care or treatment in person

## 2022-05-12 ENCOUNTER — HOSPITAL ENCOUNTER (EMERGENCY)
Facility: HOSPITAL | Age: 72
Discharge: HOME | End: 2022-05-12
Attending: EMERGENCY MEDICINE
Payer: MEDICARE

## 2022-05-12 ENCOUNTER — APPOINTMENT (EMERGENCY)
Dept: CT IMAGING | Facility: HOSPITAL | Age: 72
End: 2022-05-12
Payer: MEDICARE

## 2022-05-12 ENCOUNTER — TELEPHONE (OUTPATIENT)
Dept: FAMILY MEDICINE CLINIC | Facility: CLINIC | Age: 72
End: 2022-05-12

## 2022-05-12 VITALS
SYSTOLIC BLOOD PRESSURE: 160 MMHG | HEART RATE: 76 BPM | TEMPERATURE: 97.5 F | RESPIRATION RATE: 17 BRPM | OXYGEN SATURATION: 95 % | DIASTOLIC BLOOD PRESSURE: 74 MMHG

## 2022-05-12 DIAGNOSIS — K62.5 RECTAL BLEEDING: ICD-10-CM

## 2022-05-12 DIAGNOSIS — K52.9 COLITIS: Primary | ICD-10-CM

## 2022-05-12 LAB
ALBUMIN SERPL BCP-MCNC: 3.3 G/DL (ref 3.5–5)
ALP SERPL-CCNC: 103 U/L (ref 46–116)
ALT SERPL W P-5'-P-CCNC: 53 U/L (ref 12–78)
ANION GAP SERPL CALCULATED.3IONS-SCNC: 10 MMOL/L (ref 4–13)
APTT PPP: 40 SECONDS (ref 23–37)
AST SERPL W P-5'-P-CCNC: 30 U/L (ref 5–45)
BASOPHILS # BLD AUTO: 0.02 THOUSANDS/ΜL (ref 0–0.1)
BASOPHILS NFR BLD AUTO: 0 % (ref 0–1)
BILIRUB DIRECT SERPL-MCNC: 0.15 MG/DL (ref 0–0.2)
BILIRUB SERPL-MCNC: 0.82 MG/DL (ref 0.2–1)
BUN SERPL-MCNC: 14 MG/DL (ref 5–25)
CALCIUM SERPL-MCNC: 8.6 MG/DL (ref 8.3–10.1)
CHLORIDE SERPL-SCNC: 101 MMOL/L (ref 100–108)
CO2 SERPL-SCNC: 29 MMOL/L (ref 21–32)
CREAT SERPL-MCNC: 0.67 MG/DL (ref 0.6–1.3)
EOSINOPHIL # BLD AUTO: 0.07 THOUSAND/ΜL (ref 0–0.61)
EOSINOPHIL NFR BLD AUTO: 1 % (ref 0–6)
ERYTHROCYTE [DISTWIDTH] IN BLOOD BY AUTOMATED COUNT: 13.2 % (ref 11.6–15.1)
GFR SERPL CREATININE-BSD FRML MDRD: 88 ML/MIN/1.73SQ M
GLUCOSE SERPL-MCNC: 93 MG/DL (ref 65–140)
HCT VFR BLD AUTO: 43 % (ref 34.8–46.1)
HGB BLD-MCNC: 14 G/DL (ref 11.5–15.4)
IMM GRANULOCYTES # BLD AUTO: 0.02 THOUSAND/UL (ref 0–0.2)
IMM GRANULOCYTES NFR BLD AUTO: 0 % (ref 0–2)
INR PPP: 1.02 (ref 0.84–1.19)
LIPASE SERPL-CCNC: 94 U/L (ref 73–393)
LYMPHOCYTES # BLD AUTO: 2.49 THOUSANDS/ΜL (ref 0.6–4.47)
LYMPHOCYTES NFR BLD AUTO: 41 % (ref 14–44)
MCH RBC QN AUTO: 28.1 PG (ref 26.8–34.3)
MCHC RBC AUTO-ENTMCNC: 32.6 G/DL (ref 31.4–37.4)
MCV RBC AUTO: 86 FL (ref 82–98)
MONOCYTES # BLD AUTO: 0.59 THOUSAND/ΜL (ref 0.17–1.22)
MONOCYTES NFR BLD AUTO: 10 % (ref 4–12)
NEUTROPHILS # BLD AUTO: 2.87 THOUSANDS/ΜL (ref 1.85–7.62)
NEUTS SEG NFR BLD AUTO: 48 % (ref 43–75)
NRBC BLD AUTO-RTO: 0 /100 WBCS
PLATELET # BLD AUTO: 256 THOUSANDS/UL (ref 149–390)
PMV BLD AUTO: 9.4 FL (ref 8.9–12.7)
POTASSIUM SERPL-SCNC: 3.7 MMOL/L (ref 3.5–5.3)
PROT SERPL-MCNC: 6.7 G/DL (ref 6.4–8.2)
PROTHROMBIN TIME: 12.9 SECONDS (ref 11.6–14.5)
RBC # BLD AUTO: 4.98 MILLION/UL (ref 3.81–5.12)
SODIUM SERPL-SCNC: 140 MMOL/L (ref 136–145)
WBC # BLD AUTO: 6.06 THOUSAND/UL (ref 4.31–10.16)

## 2022-05-12 PROCEDURE — 80048 BASIC METABOLIC PNL TOTAL CA: CPT | Performed by: EMERGENCY MEDICINE

## 2022-05-12 PROCEDURE — 85610 PROTHROMBIN TIME: CPT | Performed by: EMERGENCY MEDICINE

## 2022-05-12 PROCEDURE — 99285 EMERGENCY DEPT VISIT HI MDM: CPT

## 2022-05-12 PROCEDURE — 99284 EMERGENCY DEPT VISIT MOD MDM: CPT | Performed by: EMERGENCY MEDICINE

## 2022-05-12 PROCEDURE — 83690 ASSAY OF LIPASE: CPT | Performed by: EMERGENCY MEDICINE

## 2022-05-12 PROCEDURE — 93005 ELECTROCARDIOGRAM TRACING: CPT

## 2022-05-12 PROCEDURE — 80076 HEPATIC FUNCTION PANEL: CPT | Performed by: EMERGENCY MEDICINE

## 2022-05-12 PROCEDURE — 36415 COLL VENOUS BLD VENIPUNCTURE: CPT | Performed by: EMERGENCY MEDICINE

## 2022-05-12 PROCEDURE — G1004 CDSM NDSC: HCPCS

## 2022-05-12 PROCEDURE — 74176 CT ABD & PELVIS W/O CONTRAST: CPT

## 2022-05-12 PROCEDURE — 85730 THROMBOPLASTIN TIME PARTIAL: CPT | Performed by: EMERGENCY MEDICINE

## 2022-05-12 PROCEDURE — 85025 COMPLETE CBC W/AUTO DIFF WBC: CPT | Performed by: EMERGENCY MEDICINE

## 2022-05-12 NOTE — TELEPHONE ENCOUNTER
Pt calling stating she is having abdominal pain since Tuesday  It started getting better yesterday and today it is more of a discomfort  She went to the bathroom and had a Bright red blob  3x 4 inch in the toilet  She does have a hemmroid  She is asking if you would like to see her or should she be concerned about anything  She was seen earlier this week covid +  Thank you

## 2022-05-12 NOTE — ED PROVIDER NOTES
History  Chief Complaint   Patient presents with    Rectal Bleeding     Pt states she started this morning around 0330 with rectal bleeding  Pt is currently on day 5 of covid  Pt c/o mid abdominal discomfort  76-year-old female presents for evaluation of rectal bleeding  Patient reports a diagnosis of COVID-19 infection starting on , earlier in the week she had abdominal discomfort and difficulty sleeping due to coughing and discomfort  She has had intermittent nausea without vomiting episodes throughout the week  She has also had soft stools  Today patient attempted to have a bowel movement when she had perceived blood clots with bright red blood in the toilet bowl  Patient did not have any bleeding in her undergarments  Throughout the day patient had to two successively smaller episodes of this  Patient continues with generalized abdominal discomfort, she does not recall having this problem before  She does acknowledge a hemorrhoid however denies any rectal pain  She has been taking over-the-counter pain and cold medication, last dose was probably 2 days ago  Denies use of blood thinnign medications  She reports previous colonoscopies have been notable for begi          Prior to Admission Medications   Prescriptions Last Dose Informant Patient Reported? Taking?    Alphagan P 0 1 %  Self Yes No   Sig: INT 1 GTT IN OS BID   Calcium Citrate-Vitamin D (CALCIUM CITRATE + D PO)  Self Yes No   Sig: Take by mouth   Omega-3 Fatty Acids (FISH OIL PO)  Self Yes No   Sig: Take 2 g by mouth   SM VITAMIN D3 4000 units CAPS  Self Yes No   Sig: Take 2,000 Units by mouth daily    atorvastatin (LIPITOR) 10 mg tablet   No No   Sig: Take 1 tablet (10 mg total) by mouth daily   carboxymethylcellulose 0 5 % SOLN  Self Yes No   Si drop 3 (three) times a day as needed for dry eyes     cycloSPORINE (RESTASIS) 0 05 % ophthalmic emulsion  Self Yes No   Sig: Apply 1 drop to eye   glucosamine-chondroitin 500-400 MG tablet  Self Yes No   Sig: Take 1 tablet by mouth   mometasone (NASONEX) 50 mcg/act nasal spray  Self Yes No   Sig: SHAKE LQ AND U 2 SPRAYS IEN D   multivitamin (THERAGRAN) TABS  Self Yes No   Sig: Take 1 tablet by mouth      Facility-Administered Medications: None       Past Medical History:   Diagnosis Date    Achilles tendon tear ,    bilateral    Allergic rhinitis due to pollen     Anxiety     Depression     HPV (human papilloma virus) infection     Hyperlipidemia     Kidney stones     renal calculi    Mass of neck     Osteoporosis     Uterine cancer ([facility] [address] )     UTI (urinary tract infection)        Past Surgical History:   Procedure Laterality Date     SECTION  3056,5086    COLONOSCOPY  2016    DUE     DXA PROCEDURE (HISTORICAL)      HYSTERECTOMY Bilateral     salpino-ooph    OOPHORECTOMY Bilateral     TENDON REPAIR  3155,0710    Archilles tendon repair-bilateral       Family History   Adopted: Yes   Problem Relation Age of Onset    Breast cancer Daughter 39    Parkinsonism Other     Seizures Other      I have reviewed and agree with the history as documented  E-Cigarette/Vaping    E-Cigarette Use Never User      E-Cigarette/Vaping Substances     Social History     Tobacco Use    Smoking status: Never Smoker    Smokeless tobacco: Never Used   Vaping Use    Vaping Use: Never used   Substance Use Topics    Alcohol use: Yes     Alcohol/week: 2 0 standard drinks     Types: 1 Glasses of wine, 1 Cans of beer per week     Comment: social    Drug use: Never       Review of Systems   Constitutional: Positive for chills  Negative for fever  Respiratory: Positive for cough  Negative for chest tightness and shortness of breath  Cardiovascular: Negative for chest pain  Gastrointestinal: Positive for abdominal distention, abdominal pain, blood in stool and diarrhea  Negative for constipation, nausea and vomiting     Genitourinary: Negative for dysuria and vaginal bleeding  Neurological: Negative for light-headedness and headaches  All other systems reviewed and are negative  Physical Exam  Physical Exam  Vitals reviewed  Exam conducted with a chaperone present  Constitutional:       General: She is not in acute distress  Appearance: Normal appearance  She is not ill-appearing, toxic-appearing or diaphoretic  HENT:      Head: Normocephalic and atraumatic  Right Ear: External ear normal       Left Ear: External ear normal       Nose:      Comments: Wearing KN95  Eyes:      General:         Right eye: No discharge  Left eye: No discharge  Cardiovascular:      Rate and Rhythm: Normal rate and regular rhythm  Pulmonary:      Effort: Pulmonary effort is normal  No respiratory distress  Breath sounds: Normal breath sounds  Abdominal:      General: There is no distension  Palpations: Abdomen is soft  Tenderness: There is no abdominal tenderness  There is no guarding or rebound  Genitourinary:     Rectum: External hemorrhoid (small, soft, flesh toned external hemorrhoid) present  No mass or tenderness  Comments: Internal exam tolerated well, no blood on return of digit  Musculoskeletal:         General: No deformity or signs of injury  Skin:     General: Skin is warm  Coloration: Skin is not jaundiced or pale  Neurological:      General: No focal deficit present  Mental Status: She is alert           Vital Signs  ED Triage Vitals [05/12/22 1724]   Temperature Pulse Respirations Blood Pressure SpO2   97 5 °F (36 4 °C) 92 18 (!) 194/95 96 %      Temp Source Heart Rate Source Patient Position - Orthostatic VS BP Location FiO2 (%)   Temporal Monitor Sitting Left arm --      Pain Score       No Pain           Vitals:    05/12/22 1724 05/12/22 1900   BP: (!) 194/95 160/74   Pulse: 92 76   Patient Position - Orthostatic VS: Sitting          Visual Acuity      ED Medications  Medications - No data to display    Diagnostic Studies  Results Reviewed     Procedure Component Value Units Date/Time    Lipase [[id number]]  (Normal) Collected: 05/12/22 1950    Lab Status: Final result Specimen: Blood from Arm, Right Updated: 05/12/22 2011     Lipase 94 u/L     Hepatic function panel [[id number]]  (Abnormal) Collected: 05/12/22 1950    Lab Status: Final result Specimen: Blood from Arm, Right Updated: 05/12/22 2011     Total Bilirubin 0 82 mg/dL      Bilirubin, Direct 0 15 mg/dL      Alkaline Phosphatase 103 U/L      AST 30 U/L      ALT 53 U/L      Total Protein 6 7 g/dL      Albumin 3 3 g/dL     Protime-INR [[id number]]  (Normal) Collected: 05/12/22 1841    Lab Status: Final result Specimen: Blood from Arm, Right Updated: 05/12/22 1920     Protime 12 9 seconds      INR 1 02    APTT [[id number]]  (Abnormal) Collected: 05/12/22 1841    Lab Status: Final result Specimen: Blood from Arm, Right Updated: 05/12/22 1920     PTT 40 seconds     Basic metabolic panel [[id number]] Collected: 05/12/22 1841    Lab Status: Final result Specimen: Blood from Arm, Right Updated: 05/12/22 1916     Sodium 140 mmol/L      Potassium 3 7 mmol/L      Chloride 101 mmol/L      CO2 29 mmol/L      ANION GAP 10 mmol/L      BUN 14 mg/dL      Creatinine 0 67 mg/dL      Glucose 93 mg/dL      Calcium 8 6 mg/dL      eGFR 88 ml/min/1 73sq m     Narrative:      Katherine guidelines for Chronic Kidney Disease (CKD):     Stage 1 with normal or high GFR (GFR > 90 mL/min/1 73 square meters)    Stage 2 Mild CKD (GFR = 60-89 mL/min/1 73 square meters)    Stage 3A Moderate CKD (GFR = 45-59 mL/min/1 73 square meters)    Stage 3B Moderate CKD (GFR = 30-44 mL/min/1 73 square meters)    Stage 4 Severe CKD (GFR = 15-29 mL/min/1 73 square meters)    Stage 5 End Stage CKD (GFR <15 mL/min/1 73 square meters)  Note: GFR calculation is accurate only with a steady state creatinine    CBC and differential [[id number]] Collected: 05/12/22 1841    Lab Status: Final result Specimen: Blood from Arm, Right Updated: 05/12/22 1905     WBC 6 06 Thousand/uL      RBC 4 98 Million/uL      Hemoglobin 14 0 g/dL      Hematocrit 43 0 %      MCV 86 fL      MCH 28 1 pg      MCHC 32 6 g/dL      RDW 13 2 %      MPV 9 4 fL      Platelets 905 Thousands/uL      nRBC 0 /100 WBCs      Neutrophils Relative 48 %      Immat GRANS % 0 %      Lymphocytes Relative 41 %      Monocytes Relative 10 %      Eosinophils Relative 1 %      Basophils Relative 0 %      Neutrophils Absolute 2 87 Thousands/µL      Immature Grans Absolute 0 02 Thousand/uL      Lymphocytes Absolute 2 49 Thousands/µL      Monocytes Absolute 0 59 Thousand/µL      Eosinophils Absolute 0 07 Thousand/µL      Basophils Absolute 0 02 Thousands/µL                  CT abdomen pelvis wo contrast   Final Result by Malka Martinez MD (05/12 2004)      Colitis involving the descending and sigmoid colon  Differential considerations include infection, inflammatory bowel disease and ischemia  Workstation performed: [id number]                    Procedures  Procedures         ED Course  ED Course as of 05/13/22 2155   Thu May 12, 2022   2006 CT abdomen pelvis wo contrast  IMPRESSION:     Colitis involving the descending and sigmoid colon  Differential considerations include infection, inflammatory bowel disease and ischemia                                   SBIRT 22yo+    Flowsheet Row Most Recent Value   SBIRT (23 yo +)    In order to provide better care to our patients, we are screening all of our patients for alcohol and drug use  Would it be okay to ask you these screening questions? Yes Filed at: 05/12/2022 1941   Initial Alcohol Screen: US AUDIT-C     1  How often do you have a drink containing alcohol? 0 Filed at: 05/12/2022 1941   2  How many drinks containing alcohol do you have on a typical day you are drinking? 0 Filed at: 05/12/2022 1941   3b  FEMALE Any Age, or MALE 65+:  How often do you have 4 or more drinks on one occassion? 0 Filed at: 05/12/2022 1941   Audit-C Score 0 Filed at: 05/12/2022 1941   ALTAGRACIA: How many times in the past year have you    Used an illegal drug or used a prescription medication for non-medical reasons? Never Filed at: 05/12/2022 1941                    MDM  Number of Diagnoses or Management Options  Colitis  Rectal bleeding  Diagnosis management comments: 28-year-old female presents for evaluation of episodes of rectal bleeding today  Patient is overall well-appearing, she reports a generalized abdominal discomfort however abdomen is soft, nondistended and nontender on examination  Patient had successicely smaller episodes throughout the day, internal exam shows no blood on return of digit  Uncertain etiology however patient can likely be discharged home with close follow-up and return precautions if workup is negative  Will evaluate with abdominal labs, CT imaging to rule out diverticulitis, colitis  Disposition  Final diagnoses:   Colitis   Rectal bleeding     Time reflects when diagnosis was documented in both MDM as applicable and the Disposition within this note     Time User Action Codes Description Comment    5/12/2022  8:10 PM Balwinder Jc [K52 9] Colitis     5/12/2022  8:10 PM [place] [K62 5] Rectal bleeding       ED Disposition     ED Disposition   Discharge    Condition   Stable    Date/Time   Thu May 12, 2022  8:10 PM    Comment   Cary Amezcua discharge to home/self care                 Follow-up Information     Follow up With Specialties Details Why Contact Info Additional Information    Khushboo Decker PA-C Family Medicine, Physician Assistant Schedule an appointment as soon as possible for a visit   [address] [place] [address] Emergency Department Emergency Medicine  If symptoms worsen, including but not limited to: larger volume rectal bleeding, lightheadedness or passing out, chest pain, dyspnea, or other concerning symptom  [place]  [id number]  [facility] Emergency Department, [address], [address]          Discharge Medication List as of 5/12/2022  8:15 PM      CONTINUE these medications which have NOT CHANGED    Details   Alphagan P 0 1 % INT 1 GTT IN OS BID, Historical Med      atorvastatin (LIPITOR) 10 mg tablet Take 1 tablet (10 mg total) by mouth daily, Starting Tue 3/22/2022, Normal      Calcium Citrate-Vitamin D (CALCIUM CITRATE + D PO) Take by mouth, Historical Med      carboxymethylcellulose 0 5 % SOLN 1 drop 3 (three) times a day as needed for dry eyes  , Historical Med      cycloSPORINE (RESTASIS) 0 05 % ophthalmic emulsion Apply 1 drop to eye, Historical Med      glucosamine-chondroitin 500-400 MG tablet Take 1 tablet by mouth, Historical Med      mometasone (NASONEX) 50 mcg/act nasal spray SHAKE LQ AND U 2 SPRAYS IEN D, Historical Med      multivitamin (THERAGRAN) TABS Take 1 tablet by mouth, Historical Med      Omega-3 Fatty Acids (FISH OIL PO) Take 2 g by mouth, Historical Med      SM VITAMIN D3 4000 units CAPS Take 2,000 Units by mouth daily , Historical Med             No discharge procedures on file      PDMP Review     None          ED Provider  Electronically Signed by           Radha Darby DO  05/13/22 9634

## 2022-05-12 NOTE — TELEPHONE ENCOUNTER
I would recommend that she go to the ER for evaluation  I would rather her get evaluated since she was having abdominal pain to ensure she does not have a GI bleed

## 2022-05-12 NOTE — ED NOTES
Present for rectal exam by Dr Dannielle Anthony, patient tolerated well        Jorgito Doherty, RN  05/12/22 8078

## 2022-05-13 LAB
ATRIAL RATE: 90 BPM
P AXIS: 77 DEGREES
PR INTERVAL: 148 MS
QRS AXIS: -7 DEGREES
QRSD INTERVAL: 86 MS
QT INTERVAL: 352 MS
QTC INTERVAL: 430 MS
T WAVE AXIS: 66 DEGREES
VENTRICULAR RATE: 90 BPM

## 2022-05-13 PROCEDURE — 93010 ELECTROCARDIOGRAM REPORT: CPT | Performed by: INTERNAL MEDICINE

## 2022-05-26 DIAGNOSIS — E78.5 HYPERLIPIDEMIA, UNSPECIFIED HYPERLIPIDEMIA TYPE: ICD-10-CM

## 2022-05-26 RX ORDER — ATORVASTATIN CALCIUM 10 MG/1
TABLET, FILM COATED ORAL
Qty: 90 TABLET | Refills: 0 | Status: SHIPPED | OUTPATIENT
Start: 2022-05-26 | End: 2022-06-27 | Stop reason: SDUPTHER

## 2022-06-23 ENCOUNTER — OFFICE VISIT (OUTPATIENT)
Dept: FAMILY MEDICINE CLINIC | Facility: CLINIC | Age: 72
End: 2022-06-23
Payer: MEDICARE

## 2022-06-23 VITALS
HEIGHT: 63 IN | HEART RATE: 68 BPM | BODY MASS INDEX: 27.5 KG/M2 | WEIGHT: 155.2 LBS | OXYGEN SATURATION: 95 % | TEMPERATURE: 98.6 F | DIASTOLIC BLOOD PRESSURE: 68 MMHG | SYSTOLIC BLOOD PRESSURE: 120 MMHG

## 2022-06-23 DIAGNOSIS — K76.89 BENIGN LIVER CYST: Primary | ICD-10-CM

## 2022-06-23 DIAGNOSIS — Z12.31 ENCOUNTER FOR SCREENING MAMMOGRAM FOR MALIGNANT NEOPLASM OF BREAST: ICD-10-CM

## 2022-06-23 PROCEDURE — 99213 OFFICE O/P EST LOW 20 MIN: CPT | Performed by: PHYSICIAN ASSISTANT

## 2022-06-23 NOTE — PROGRESS NOTES
Assessment/Plan:    Problem List Items Addressed This Visit    None     Visit Diagnoses     Benign liver cyst    -  Primary    Encounter for screening mammogram for malignant neoplasm of breast        Relevant Orders    Mammo screening bilateral w 3d & cad           Diagnoses and all orders for this visit:    Benign liver cyst    Encounter for screening mammogram for malignant neoplasm of breast  -     Mammo screening bilateral w 3d & cad; Future      Reviewed results of CT scan with patient and reviewed that cysts on liver are benign and do not require further workup  Liver function was normal      Subjective:      Patient ID: Deb Milner is a 66 y o  female  Samara Mendoza is a pleasant 66year old female who is here today accompanied by her  for a follow-up from the ER  She was seen in the ER for rectal bleeding and colitis 2022  The colitis resolved and she has not had any additional episodes of rectal bleeding  There were some incidental findings on her CT scan of her liver that she was concerned about and wanted to discuss  CT scan showed: "There are one or more hepatic simple cyst(s) present  No CT evidence of suspicious solid hepatic mass  Normal hepatic contours  No biliary dilatation " She had labs that were all normal  She vidhi any other concerns at this time  The following portions of the patient's history were reviewed and updated as appropriate:   She has a past medical history of Achilles tendon tear (5716,3109), Allergic rhinitis due to pollen, Anxiety, Depression, HPV (human papilloma virus) infection, Hyperlipidemia, Kidney stones, Mass of neck (), Osteoporosis, Uterine cancer ([facility] [address] ) (), and UTI (urinary tract infection)  ,  does not have any pertinent problems on file  ,   has a past surgical history that includes Oophorectomy (Bilateral, ); Tendon repair (6723,2017);  section (7210,9029);  Colonoscopy (); DXA procedure(historical) (); and Hysterectomy (Bilateral, 2014)  ,  family history includes Breast cancer (age of onset: 39) in her daughter; Parkinsonism in her other; Seizures in her other  She was adopted  ,   reports that she has never smoked  She has never used smokeless tobacco  She reports current alcohol use of about 2 0 standard drinks of alcohol per week  She reports that she does not use drugs  ,  is allergic to cefaclor, cephalosporins, and prednisone     Current Outpatient Medications   Medication Sig Dispense Refill    atorvastatin (LIPITOR) 10 mg tablet TAKE 1 TABLET BY MOUTH  DAILY 90 tablet 0    Calcium Citrate-Vitamin D (CALCIUM CITRATE + D PO) Take by mouth      cycloSPORINE (RESTASIS) 0 05 % ophthalmic emulsion Apply 1 drop to eye      glucosamine-chondroitin 500-400 MG tablet Take 1 tablet by mouth      mometasone (NASONEX) 50 mcg/act nasal spray SHAKE LQ AND U 2 SPRAYS IEN D  3    multivitamin (THERAGRAN) TABS Take 1 tablet by mouth      Omega-3 Fatty Acids (FISH OIL PO) Take 2 g by mouth      SM VITAMIN D3 4000 units CAPS Take 2,000 Units by mouth daily       carboxymethylcellulose 0 5 % SOLN 1 drop 3 (three) times a day as needed for dry eyes   (Patient not taking: Reported on 6/23/2022)       No current facility-administered medications for this visit  Review of Systems   Constitutional: Negative for chills, diaphoresis, fatigue and fever  HENT: Negative for congestion, ear pain, postnasal drip, rhinorrhea, sneezing, sore throat and trouble swallowing  Eyes: Negative for pain and visual disturbance  Respiratory: Negative for apnea, cough, shortness of breath and wheezing  Cardiovascular: Negative for chest pain and palpitations  Gastrointestinal: Negative for abdominal pain, constipation, diarrhea, nausea and vomiting  Genitourinary: Negative for dysuria  Musculoskeletal: Negative for arthralgias, gait problem and myalgias     Neurological: Negative for dizziness, syncope, weakness, light-headedness, numbness and headaches  Psychiatric/Behavioral: Negative for suicidal ideas  The patient is not nervous/anxious  Objective:  Vitals:    06/23/22 0859   BP: 120/68   Pulse: 68   Temp: 98 6 °F (37 °C)   SpO2: 95%   Weight: 70 4 kg (155 lb 3 2 oz)   Height: 5' 2 5" (1 588 m)     Body mass index is 27 93 kg/m²  Physical Exam  Vitals and nursing note reviewed  Constitutional:       Appearance: She is well-developed  HENT:      Head: Normocephalic and atraumatic  Right Ear: External ear normal       Left Ear: External ear normal       Nose: Nose normal       Mouth/Throat:      Pharynx: No oropharyngeal exudate or posterior oropharyngeal erythema  Eyes:      Extraocular Movements: Extraocular movements intact  Cardiovascular:      Rate and Rhythm: Normal rate and regular rhythm  Heart sounds: Normal heart sounds  No murmur heard  No friction rub  No gallop  Pulmonary:      Effort: Pulmonary effort is normal  No respiratory distress  Breath sounds: Normal breath sounds  No wheezing or rales  Abdominal:      Palpations: Abdomen is soft  Musculoskeletal:         General: Normal range of motion  Cervical back: Normal range of motion and neck supple  Skin:     General: Skin is warm and dry  Neurological:      Mental Status: She is alert and oriented to person, place, and time  Psychiatric:         Behavior: Behavior normal          Thought Content:  Thought content normal          Judgment: Judgment normal

## 2022-06-27 DIAGNOSIS — E78.5 HYPERLIPIDEMIA, UNSPECIFIED HYPERLIPIDEMIA TYPE: ICD-10-CM

## 2022-06-27 RX ORDER — ATORVASTATIN CALCIUM 10 MG/1
10 TABLET, FILM COATED ORAL DAILY
Qty: 90 TABLET | Refills: 0 | Status: SHIPPED | OUTPATIENT
Start: 2022-06-27

## 2022-08-02 ENCOUNTER — OFFICE VISIT (OUTPATIENT)
Dept: FAMILY MEDICINE CLINIC | Facility: CLINIC | Age: 72
End: 2022-08-02
Payer: MEDICARE

## 2022-08-02 VITALS
OXYGEN SATURATION: 97 % | WEIGHT: 156.2 LBS | BODY MASS INDEX: 27.68 KG/M2 | HEIGHT: 63 IN | SYSTOLIC BLOOD PRESSURE: 124 MMHG | TEMPERATURE: 98 F | HEART RATE: 83 BPM | DIASTOLIC BLOOD PRESSURE: 88 MMHG

## 2022-08-02 DIAGNOSIS — E78.5 HYPERLIPIDEMIA, UNSPECIFIED HYPERLIPIDEMIA TYPE: ICD-10-CM

## 2022-08-02 DIAGNOSIS — E55.9 VITAMIN D DEFICIENCY: ICD-10-CM

## 2022-08-02 DIAGNOSIS — Z78.0 POSTMENOPAUSAL: ICD-10-CM

## 2022-08-02 DIAGNOSIS — Z00.00 MEDICARE ANNUAL WELLNESS VISIT, SUBSEQUENT: Primary | ICD-10-CM

## 2022-08-02 DIAGNOSIS — Z11.59 NEED FOR HEPATITIS C SCREENING TEST: ICD-10-CM

## 2022-08-02 PROCEDURE — G0438 PPPS, INITIAL VISIT: HCPCS | Performed by: PHYSICIAN ASSISTANT

## 2022-08-02 NOTE — PROGRESS NOTES
Assessment and Plan:     Problem List Items Addressed This Visit        Other    Hyperlipidemia    Relevant Orders    Comprehensive metabolic panel    Lipid panel      Other Visit Diagnoses     Medicare annual wellness visit, subsequent    -  Primary    Relevant Orders    CBC and differential    Vitamin D deficiency        Relevant Orders    Vitamin D 25 hydroxy    Postmenopausal        Relevant Orders    DXA bone density spine hip and pelvis    Need for hepatitis C screening test        Relevant Orders    Hepatitis C antibody        DXA scan ordered  Routine labs ordered  She is up to date with mammograms and colon cancer screenings  Continue to follow with specialists as scheduled  Depression Screening and Follow-up Plan: Patient was screened for depression during today's encounter  They screened negative with a PHQ-2 score of 0  Preventive health issues were discussed with patient, and age appropriate screening tests were ordered as noted in patient's After Visit Summary  Personalized health advice and appropriate referrals for health education or preventive services given if needed, as noted in patient's After Visit Summary  History of Present Illness:     Patient presents for a Medicare Wellness Visit    Meredith Alvarez is a pleasant 66year old female who is here today for a medicare annual well visit  She does not have any acute concerns  She is due for a DXA scan  She is scheduled for all of her other routine screenings  She is up to date with vaccinations  She follows with her urologist and gynecologist on a regular basis  She is due for her routine screening labs  Patient Care Team:  Huang Krause PA-C as PCP - General (Family Medicine)     Review of Systems:     Review of Systems   Constitutional: Negative for chills, diaphoresis, fatigue and fever  HENT: Negative for congestion, ear pain, postnasal drip, rhinorrhea, sneezing, sore throat and trouble swallowing      Eyes: Negative for pain and visual disturbance  Respiratory: Negative for apnea, cough, shortness of breath and wheezing  Cardiovascular: Negative for chest pain and palpitations  Gastrointestinal: Negative for abdominal pain, constipation, diarrhea, nausea and vomiting  Genitourinary: Negative for dysuria and hematuria  Musculoskeletal: Negative for arthralgias, gait problem and myalgias  Neurological: Negative for dizziness, syncope, weakness, light-headedness, numbness and headaches  Psychiatric/Behavioral: Negative for suicidal ideas  The patient is not nervous/anxious           Problem List:     Patient Active Problem List   Diagnosis    [facility] ([facility] )    Nephrolithiasis    Hyperlipidemia      Past Medical and Surgical History:     Past Medical History:   Diagnosis Date    Achilles tendon tear ,    bilateral    Allergic rhinitis due to pollen     Anxiety     Depression     HPV (human papilloma virus) infection     Hyperlipidemia     Kidney stones     renal calculi    Mass of neck     Osteoporosis     Uterine cancer ([place] [address] ) 2014    UTI (urinary tract infection)      Past Surgical History:   Procedure Laterality Date     SECTION  6731,9507    COLONOSCOPY  2016    DUE     DXA PROCEDURE (HISTORICAL)      HYSTERECTOMY Bilateral 2014    salpino-ooph    OOPHORECTOMY Bilateral 2014    TENDON REPAIR  4713,3996    Archilles tendon repair-bilateral      Family History:     Family History   Adopted: Yes   Problem Relation Age of Onset    Breast cancer Daughter 39    Parkinsonism Other     Seizures Other       Social History:     Social History     Socioeconomic History    Marital status: /Civil Union     Spouse name: None    Number of children: None    Years of education: None    Highest education level: None   Occupational History    None   Tobacco Use    Smoking status: Never Smoker    Smokeless tobacco: Never Used   Vaping Use    Vaping Use: Never used Substance and Sexual Activity    Alcohol use: Yes     Alcohol/week: 2 0 standard drinks     Types: 1 Glasses of wine, 1 Cans of beer per week     Comment: social    Drug use: Never    Sexual activity: Not Currently     Partners: Male     Birth control/protection: Post-menopausal     Comment: hysterectomy   Other Topics Concern    None   Social History Narrative    None     Social Determinants of Health     Financial Resource Strain: Not on file   Food Insecurity: Not on file   Transportation Needs: Not on file   Physical Activity: Not on file   Stress: Not on file   Social Connections: Not on file   Intimate Partner Violence: Not on file   Housing Stability: Not on file      Medications and Allergies:     Current Outpatient Medications   Medication Sig Dispense Refill    atorvastatin (LIPITOR) 10 mg tablet Take 1 tablet (10 mg total) by mouth daily 90 tablet 0    Calcium Citrate-Vitamin D (CALCIUM CITRATE + D PO) Take by mouth      cycloSPORINE (RESTASIS) 0 05 % ophthalmic emulsion Apply 1 drop to eye      glucosamine-chondroitin 500-400 MG tablet Take 1 tablet by mouth      mometasone (NASONEX) 50 mcg/act nasal spray SHAKE LQ AND U 2 SPRAYS IEN D  3    multivitamin (THERAGRAN) TABS Take 1 tablet by mouth      Omega-3 Fatty Acids (FISH OIL PO) Take 2 g by mouth      SM VITAMIN D3 4000 units CAPS Take 2,000 Units by mouth daily       carboxymethylcellulose 0 5 % SOLN 1 drop 3 (three) times a day as needed for dry eyes   (Patient not taking: No sig reported)       No current facility-administered medications for this visit       Allergies   Allergen Reactions    Cefaclor Other (See Comments)     swelling and itching of hands and feet    Cephalosporins Itching    Prednisone Other (See Comments)     disconnected feeling      Immunizations:     Immunization History   Administered Date(s) Administered    COVID-19 MODERNA VACC 0 25 ML IM BOOSTER 11/01/2021    COVID-19 MODERNA VACC 0 5 ML IM 03/10/2021, 04/07/2021    INFLUENZA 10/29/2007, 10/23/2008, 11/20/2015, 09/22/2016, 11/01/2017, 10/22/2018, 10/08/2021    Influenza Split High Dose Preservative Free IM 10/07/2019    Pneumococcal Polysaccharide PPV23 09/22/2016      Health Maintenance:         Topic Date Due    Hepatitis C Screening  Never done    Breast Cancer Screening: Mammogram  08/17/2022    Colorectal Cancer Screening  11/07/2026         Topic Date Due    Pneumococcal Vaccine: 65+ Years (2 - PCV) 09/22/2017    COVID-19 Vaccine (4 - Booster for Moderna series) 03/01/2022    Influenza Vaccine (1) 09/01/2022      Medicare Screening Tests and Risk Assessments:     Brie Andrade is here for her Subsequent Wellness visit  Health Risk Assessment:   Patient rates overall health as good  Patient feels that their physical health rating is same  Patient is very satisfied with their life  Eyesight was rated as same  Hearing was rated as same  Patient feels that their emotional and mental health rating is same  Patients states they are never, rarely angry  Patient states they are never, rarely unusually tired/fatigued  Pain experienced in the last 7 days has been none  Patient states that she has experienced no weight loss or gain in last 6 months  Depression Screening:   PHQ-2 Score: 0      Fall Risk Screening: In the past year, patient has experienced: no history of falling in past year      Urinary Incontinence Screening:   Patient has not leaked urine accidently in the last six months  Home Safety:  Patient does not have trouble with stairs inside or outside of their home  Patient has working smoke alarms and has working carbon monoxide detector  Home safety hazards include: none  Nutrition:   Current diet is Regular  Medications:   Patient is currently taking over-the-counter supplements  OTC medications include: see medication list  Patient is able to manage medications       Activities of Daily Living (ADLs)/Instrumental Activities of Daily Living (IADLs):   Walk and transfer into and out of bed and chair?: Yes  Dress and groom yourself?: Yes    Bathe or shower yourself?: Yes    Feed yourself? Yes  Do your laundry/housekeeping?: Yes  Manage your money, pay your bills and track your expenses?: Yes  Make your own meals?: Yes    Do your own shopping?: Yes    Previous Hospitalizations:   Any hospitalizations or ED visits within the last 12 months?: Yes    How many hospitalizations have you had in the last year?: 1-2    Advance Care Planning:   Living will: Yes    Durable POA for healthcare: Yes    Advanced directive: Yes      Cognitive Screening:   Provider or family/friend/caregiver concerned regarding cognition?: No    PREVENTIVE SCREENINGS      Cardiovascular Screening:    General: Screening Not Indicated, History Lipid Disorder and Risks and Benefits Discussed    Due for: Lipid Panel      Diabetes Screening:     General: Screening Current and Risks and Benefits Discussed    Due for: Blood Glucose      Colorectal Cancer Screening:     General: Screening Current      Breast Cancer Screening:     General: Screening Current and Risks and Benefits Discussed      Cervical Cancer Screening:    General: Screening Not Indicated, Risks and Benefits Discussed and Screening Current      Osteoporosis Screening:    General: Risks and Benefits Discussed    Due for: DXA Axial      Abdominal Aortic Aneurysm (AAA) Screening:        General: Screening Not Indicated      Lung Cancer Screening:     General: Screening Not Indicated      Hepatitis C Screening:    General: Risks and Benefits Discussed    Hep C Screening Accepted: Yes      Screening, Brief Intervention, and Referral to Treatment (SBIRT)      Brief Intervention  Alcohol & drug use screenings were reviewed  No concerns regarding substance use disorder identified       Other Counseling Topics:   Car/seat belt/driving safety, skin self-exam, sunscreen and calcium and vitamin D intake and regular weightbearing exercise  Physical Exam:     /88   Pulse 83   Temp 98 °F (36 7 °C)   Ht 5' 2 5" (1 588 m)   Wt 70 9 kg (156 lb 3 2 oz)   LMP  (LMP Unknown) Comment: hysterectomy 2014  SpO2 97%   BMI 28 11 kg/m²     Physical Exam  Vitals and nursing note reviewed  Constitutional:       General: She is not in acute distress  Appearance: She is well-developed  She is not diaphoretic  HENT:      Head: Normocephalic and atraumatic  Right Ear: Hearing, tympanic membrane, ear canal and external ear normal       Left Ear: Hearing, tympanic membrane, ear canal and external ear normal       Nose: Nose normal  No mucosal edema or rhinorrhea  Mouth/Throat:      Pharynx: No oropharyngeal exudate or posterior oropharyngeal erythema  Cardiovascular:      Rate and Rhythm: Normal rate and regular rhythm  Heart sounds: Normal heart sounds  No murmur heard  No friction rub  No gallop  Pulmonary:      Effort: Pulmonary effort is normal  No respiratory distress  Breath sounds: Normal breath sounds  No wheezing or rales  Abdominal:      Palpations: Abdomen is soft  Tenderness: There is no abdominal tenderness  Musculoskeletal:         General: Normal range of motion  Cervical back: Normal range of motion and neck supple  Lymphadenopathy:      Cervical: No cervical adenopathy  Skin:     General: Skin is warm and dry  Findings: No rash  Neurological:      Mental Status: She is alert and oriented to person, place, and time  Psychiatric:         Behavior: Behavior normal          Thought Content:  Thought content normal          Judgment: Judgment normal           Vergia BARON Han

## 2022-08-02 NOTE — PATIENT INSTRUCTIONS
Medicare Preventive Visit Patient Instructions  Thank you for completing your Welcome to Medicare Visit or Medicare Annual Wellness Visit today  Your next wellness visit will be due in one year (8/3/2023)  The screening/preventive services that you may require over the next 5-10 years are detailed below  Some tests may not apply to you based off risk factors and/or age  Screening tests ordered at today's visit but not completed yet may show as past due  Also, please note that scanned in results may not display below  Preventive Screenings:  Service Recommendations Previous Testing/Comments   Colorectal Cancer Screening  * Colonoscopy    * Fecal Occult Blood Test (FOBT)/Fecal Immunochemical Test (FIT)  * Fecal DNA/Cologuard Test  * Flexible Sigmoidoscopy Age: 54-65 years old   Colonoscopy: every 10 years (may be performed more frequently if at higher risk)  OR  FOBT/FIT: every 1 year  OR  Cologuard: every 3 years  OR  Sigmoidoscopy: every 5 years  Screening may be recommended earlier than age 48 if at higher risk for colorectal cancer  Also, an individualized decision between you and your healthcare provider will decide whether screening between the ages of 74-80 would be appropriate  Colonoscopy: 11/08/2021  FOBT/FIT: Not on file  Cologuard: Not on file  Sigmoidoscopy: Not on file    Screening Current     Breast Cancer Screening Age: 36 years old  Frequency: every 1-2 years  Not required if history of left and right mastectomy Mammogram: 08/17/2021    Screening Current   Cervical Cancer Screening Between the ages of 21-29, pap smear recommended once every 3 years  Between the ages of 33-67, can perform pap smear with HPV co-testing every 5 years     Recommendations may differ for women with a history of total hysterectomy, cervical cancer, or abnormal pap smears in past  Pap Smear: 09/21/2021    Screening Not Indicated   Hepatitis C Screening Once for adults born between 1945 and 1965  More frequently in patients at high risk for Hepatitis C Hep C Antibody: Not on file        Diabetes Screening 1-2 times per year if you're at risk for diabetes or have pre-diabetes Fasting glucose: 93 mg/dL   A1C: No results in last 5 years    Screening Current   Cholesterol Screening Once every 5 years if you don't have a lipid disorder  May order more often based on risk factors  Lipid panel: 09/10/2021    Screening Not Indicated  History Lipid Disorder     Other Preventive Screenings Covered by Medicare:  1  Abdominal Aortic Aneurysm (AAA) Screening: covered once if your at risk  You're considered to be at risk if you have a family history of AAA  2  Lung Cancer Screening: covers low dose CT scan once per year if you meet all of the following conditions: (1) Age 50-69; (2) No signs or symptoms of lung cancer; (3) Current smoker or have quit smoking within the last 15 years; (4) You have a tobacco smoking history of at least 30 pack years (packs per day multiplied by number of years you smoked); (5) You get a written order from a healthcare provider  3  Glaucoma Screening: covered annually if you're considered high risk: (1) You have diabetes OR (2) Family history of glaucoma OR (3)  aged 48 and older OR (3)  American aged 72 and older  3  Osteoporosis Screening: covered every 2 years if you meet one of the following conditions: (1) You're estrogen deficient and at risk for osteoporosis based off medical history and other findings; (2) Have a vertebral abnormality; (3) On glucocorticoid therapy for more than 3 months; (4) Have primary hyperparathyroidism; (5) On osteoporosis medications and need to assess response to drug therapy  · Last bone density test (DXA Scan): 08/07/2020   5  HIV Screening: covered annually if you're between the age of 15-65  Also covered annually if you are younger than 13 and older than 72 with risk factors for HIV infection   For pregnant patients, it is covered up to 3 times per pregnancy  Immunizations:  Immunization Recommendations   Influenza Vaccine Annual influenza vaccination during flu season is recommended for all persons aged >= 6 months who do not have contraindications   Pneumococcal Vaccine (Prevnar and Pneumovax)  * Prevnar = PCV13  * Pneumovax = PPSV23   Adults 25-60 years old: 1-3 doses may be recommended based on certain risk factors  Adults 72 years old: Prevnar (PCV13) vaccine recommended followed by Pneumovax (PPSV23) vaccine  If already received PPSV23 since turning 65, then PCV13 recommended at least one year after PPSV23 dose  Hepatitis B Vaccine 3 dose series if at intermediate or high risk (ex: diabetes, end stage renal disease, liver disease)   Tetanus (Td) Vaccine - COST NOT COVERED BY MEDICARE PART B Following completion of primary series, a booster dose should be given every 10 years to maintain immunity against tetanus  Td may also be given as tetanus wound prophylaxis  Tdap Vaccine - COST NOT COVERED BY MEDICARE PART B Recommended at least once for all adults  For pregnant patients, recommended with each pregnancy  Shingles Vaccine (Shingrix) - COST NOT COVERED BY MEDICARE PART B  2 shot series recommended in those aged 48 and above     Health Maintenance Due:      Topic Date Due    Hepatitis C Screening  Never done    Breast Cancer Screening: Mammogram  08/17/2022    Colorectal Cancer Screening  11/07/2026     Immunizations Due:      Topic Date Due    Pneumococcal Vaccine: 65+ Years (2 - PCV) 09/22/2017    COVID-19 Vaccine (4 - Booster for Moderna series) 03/01/2022    Influenza Vaccine (1) 09/01/2022     Advance Directives   What are advance directives? Advance directives are legal documents that state your wishes and plans for medical care  These plans are made ahead of time in case you lose your ability to make decisions for yourself   Advance directives can apply to any medical decision, such as the treatments you want, and if you want to donate organs  What are the types of advance directives? There are many types of advance directives, and each state has rules about how to use them  You may choose a combination of any of the following:  · Living will: This is a written record of the treatment you want  You can also choose which treatments you do not want, which to limit, and which to stop at a certain time  This includes surgery, medicine, IV fluid, and tube feedings  · Durable power of  for healthcare [facility]): This is a written record that states who you want to make healthcare choices for you when you are unable to make them for yourself  This person, called a proxy, is usually a family member or a friend  You may choose more than 1 proxy  · Do not resuscitate (DNR) order:  A DNR order is used in case your heart stops beating or you stop breathing  It is a request not to have certain forms of treatment, such as CPR  A DNR order may be included in other types of advance directives  · Medical directive: This covers the care that you want if you are in a coma, near death, or unable to make decisions for yourself  You can list the treatments you want for each condition  Treatment may include pain medicine, surgery, blood transfusions, dialysis, IV or tube feedings, and a ventilator (breathing machine)  · Values history: This document has questions about your views, beliefs, and how you feel and think about life  This information can help others choose the care that you would choose  Why are advance directives important? An advance directive helps you control your care  Although spoken wishes may be used, it is better to have your wishes written down  Spoken wishes can be misunderstood, or not followed  Treatments may be given even if you do not want them  An advance directive may make it easier for your family to make difficult choices about your care     Weight Management   Why it is important to manage your weight:  Being overweight increases your risk of health conditions such as heart disease, high blood pressure, type 2 diabetes, and certain types of cancer  It can also increase your risk for osteoarthritis, sleep apnea, and other respiratory problems  Aim for a slow, steady weight loss  Even a small amount of weight loss can lower your risk of health problems  How to lose weight safely:  A safe and healthy way to lose weight is to eat fewer calories and get regular exercise  You can lose up about 1 pound a week by decreasing the number of calories you eat by 500 calories each day  Healthy meal plan for weight management:  A healthy meal plan includes a variety of foods, contains fewer calories, and helps you stay healthy  A healthy meal plan includes the following:  · Eat whole-grain foods more often  A healthy meal plan should contain fiber  Fiber is the part of grains, fruits, and vegetables that is not broken down by your body  Whole-grain foods are healthy and provide extra fiber in your diet  Some examples of whole-grain foods are whole-wheat breads and pastas, oatmeal, brown rice, and bulgur  · Eat a variety of vegetables every day  Include dark, leafy greens such as spinach, kale, damian greens, and mustard greens  Eat yellow and orange vegetables such as carrots, sweet potatoes, and winter squash  · Eat a variety of fruits every day  Choose fresh or canned fruit (canned in its own juice or light syrup) instead of juice  Fruit juice has very little or no fiber  · Eat low-fat dairy foods  Drink fat-free (skim) milk or 1% milk  Eat fat-free yogurt and low-fat cottage cheese  Try low-fat cheeses such as mozzarella and other reduced-fat cheeses  · Choose meat and other protein foods that are low in fat  Choose beans or other legumes such as split peas or lentils  Choose fish, skinless poultry (chicken or turkey), or lean cuts of red meat (beef or pork)  Before you cook meat or poultry, cut off any visible fat  · Use less fat and oil  Try baking foods instead of frying them  Add less fat, such as margarine, sour cream, regular salad dressing and mayonnaise to foods  Eat fewer high-fat foods  Some examples of high-fat foods include french fries, doughnuts, ice cream, and cakes  · Eat fewer sweets  Limit foods and drinks that are high in sugar  This includes candy, cookies, regular soda, and sweetened drinks  Exercise:  Exercise at least 30 minutes per day on most days of the week  Some examples of exercise include walking, biking, dancing, and swimming  You can also fit in more physical activity by taking the stairs instead of the elevator or parking farther away from stores  Ask your healthcare provider about the best exercise plan for you  © Copyright [employer] 2018 Information is for End User's use only and may not be sold, redistributed or otherwise used for commercial purposes   All illustrations and images included in CareNotes® are the copyrighted property of [employer]  or [address] [place] [address]

## 2022-08-28 DIAGNOSIS — E78.5 HYPERLIPIDEMIA, UNSPECIFIED HYPERLIPIDEMIA TYPE: ICD-10-CM

## 2022-08-29 RX ORDER — ATORVASTATIN CALCIUM 10 MG/1
TABLET, FILM COATED ORAL
Qty: 90 TABLET | Refills: 0 | Status: SHIPPED | OUTPATIENT
Start: 2022-08-29

## 2022-08-31 ENCOUNTER — APPOINTMENT (OUTPATIENT)
Dept: LAB | Facility: MEDICAL CENTER | Age: 72
End: 2022-08-31
Payer: MEDICARE

## 2022-08-31 DIAGNOSIS — Z00.00 MEDICARE ANNUAL WELLNESS VISIT, SUBSEQUENT: ICD-10-CM

## 2022-08-31 DIAGNOSIS — E55.9 VITAMIN D DEFICIENCY: ICD-10-CM

## 2022-08-31 DIAGNOSIS — E78.5 HYPERLIPIDEMIA, UNSPECIFIED HYPERLIPIDEMIA TYPE: ICD-10-CM

## 2022-08-31 DIAGNOSIS — Z11.59 NEED FOR HEPATITIS C SCREENING TEST: ICD-10-CM

## 2022-08-31 LAB
25(OH)D3 SERPL-MCNC: 37.9 NG/ML (ref 30–100)
ALBUMIN SERPL BCP-MCNC: 3.7 G/DL (ref 3.5–5)
ALP SERPL-CCNC: 104 U/L (ref 46–116)
ALT SERPL W P-5'-P-CCNC: 36 U/L (ref 12–78)
ANION GAP SERPL CALCULATED.3IONS-SCNC: 4 MMOL/L (ref 4–13)
AST SERPL W P-5'-P-CCNC: 20 U/L (ref 5–45)
BASOPHILS # BLD AUTO: 0.03 THOUSANDS/ΜL (ref 0–0.1)
BASOPHILS NFR BLD AUTO: 0 % (ref 0–1)
BILIRUB SERPL-MCNC: 0.95 MG/DL (ref 0.2–1)
BUN SERPL-MCNC: 16 MG/DL (ref 5–25)
CALCIUM SERPL-MCNC: 9.2 MG/DL (ref 8.3–10.1)
CHLORIDE SERPL-SCNC: 105 MMOL/L (ref 96–108)
CHOLEST SERPL-MCNC: 171 MG/DL
CO2 SERPL-SCNC: 28 MMOL/L (ref 21–32)
CREAT SERPL-MCNC: 0.67 MG/DL (ref 0.6–1.3)
EOSINOPHIL # BLD AUTO: 0.1 THOUSAND/ΜL (ref 0–0.61)
EOSINOPHIL NFR BLD AUTO: 2 % (ref 0–6)
ERYTHROCYTE [DISTWIDTH] IN BLOOD BY AUTOMATED COUNT: 13.2 % (ref 11.6–15.1)
GFR SERPL CREATININE-BSD FRML MDRD: 88 ML/MIN/1.73SQ M
GLUCOSE P FAST SERPL-MCNC: 92 MG/DL (ref 65–99)
HCT VFR BLD AUTO: 43.5 % (ref 34.8–46.1)
HCV AB SER QL: NORMAL
HDLC SERPL-MCNC: 68 MG/DL
HGB BLD-MCNC: 14 G/DL (ref 11.5–15.4)
IMM GRANULOCYTES # BLD AUTO: 0.01 THOUSAND/UL (ref 0–0.2)
IMM GRANULOCYTES NFR BLD AUTO: 0 % (ref 0–2)
LDLC SERPL CALC-MCNC: 86 MG/DL (ref 0–100)
LYMPHOCYTES # BLD AUTO: 1.96 THOUSANDS/ΜL (ref 0.6–4.47)
LYMPHOCYTES NFR BLD AUTO: 29 % (ref 14–44)
MCH RBC QN AUTO: 28.1 PG (ref 26.8–34.3)
MCHC RBC AUTO-ENTMCNC: 32.2 G/DL (ref 31.4–37.4)
MCV RBC AUTO: 87 FL (ref 82–98)
MONOCYTES # BLD AUTO: 0.38 THOUSAND/ΜL (ref 0.17–1.22)
MONOCYTES NFR BLD AUTO: 6 % (ref 4–12)
NEUTROPHILS # BLD AUTO: 4.24 THOUSANDS/ΜL (ref 1.85–7.62)
NEUTS SEG NFR BLD AUTO: 63 % (ref 43–75)
NONHDLC SERPL-MCNC: 103 MG/DL
NRBC BLD AUTO-RTO: 0 /100 WBCS
PLATELET # BLD AUTO: 311 THOUSANDS/UL (ref 149–390)
PMV BLD AUTO: 9.1 FL (ref 8.9–12.7)
POTASSIUM SERPL-SCNC: 4.2 MMOL/L (ref 3.5–5.3)
PROT SERPL-MCNC: 7 G/DL (ref 6.4–8.4)
RBC # BLD AUTO: 4.98 MILLION/UL (ref 3.81–5.12)
SODIUM SERPL-SCNC: 137 MMOL/L (ref 135–147)
TRIGL SERPL-MCNC: 86 MG/DL
WBC # BLD AUTO: 6.72 THOUSAND/UL (ref 4.31–10.16)

## 2022-08-31 PROCEDURE — 82306 VITAMIN D 25 HYDROXY: CPT

## 2022-08-31 PROCEDURE — 85025 COMPLETE CBC W/AUTO DIFF WBC: CPT

## 2022-08-31 PROCEDURE — 80053 COMPREHEN METABOLIC PANEL: CPT

## 2022-08-31 PROCEDURE — 80061 LIPID PANEL: CPT

## 2022-08-31 PROCEDURE — 86803 HEPATITIS C AB TEST: CPT

## 2022-08-31 PROCEDURE — 36415 COLL VENOUS BLD VENIPUNCTURE: CPT

## 2022-10-17 NOTE — PROGRESS NOTES
Assessment/Plan:     Recommended monthly SBE, annual CBE and annual screening mammo  ASCCP guidelines reviewed and pap with cotesting done at pt request  Advised of possible financial responsibility  DEXA ordered and colonoscopy noted to be up to date  Reviewed diet/activity recommendations Calcium 1200 mg and Vit D 600-1000 IU daily  Discussed postmenopausal considerations and symptoms to report  Kegel exercises as instructed  RTO in one year for routine annual gyn exam or sooner PRN  Diagnoses and all orders for this visit:    History of uterine cancer  -     Liquid-based pap, screening    Encounter for gynecological examination (general) (routine) without abnormal findings    Encounter for Papanicolaou smear of vagina as part of routine gynecological examination  -     Liquid-based pap, screening    Menopause    Osteoporosis screening    Screening mammogram for breast cancer    Other orders  -     brimonidine (ALPHAGAN P) 0 15 % ophthalmic solution; 1 drop 3 (three) times a day        Subjective:      Patient ID: Desire Costa is a 66 y o  female  This patient presents for routine annual gyn exam   She has a hx of hysterectomy at [facility] for endometrial adenocarcinoma in 2014  Patient states she has been released from oncology  She denies gyn concerns  She denies vaginal bleeding or spotting, VM sx, pelvic pain,  breast concerns, abnormal discharge, bowel/bladder dysfunction, depression/anx  , not sexually active  Pap/HPV up to date and normal, 9/21/21  Mammography up to date and normal, 8/17/21  Osteoporosis screening up to date, osteopenia, 8/7/20  Colonoscopy up to date, 11/8/21  The following portions of the patient's history were reviewed and updated as appropriate: allergies, current medications, past family history, past medical history, past social history, past surgical history and problem list     Review of Systems   Constitutional: Negative  Respiratory: Negative  Cardiovascular: Negative  Gastrointestinal: Negative  Endocrine: Negative  Genitourinary: Negative for dysuria, frequency, pelvic pain, urgency, vaginal bleeding, vaginal discharge and vaginal pain  Musculoskeletal: Negative  Skin: Negative  Neurological: Negative  Psychiatric/Behavioral: Negative  Objective:      /82   Pulse 84   Ht 5' 3" (1 6 m)   Wt 72 6 kg (160 lb)   LMP  (LMP Unknown) Comment: hysterectomy 2014  BMI 28 34 kg/m²          Physical Exam  Vitals and nursing note reviewed  Exam conducted with a chaperone present  Constitutional:       Appearance: Normal appearance  She is well-developed  HENT:      Head: Normocephalic and atraumatic  Neck:      Thyroid: No thyroid mass or thyromegaly  Cardiovascular:      Rate and Rhythm: Normal rate and regular rhythm  Heart sounds: Normal heart sounds  Pulmonary:      Effort: Pulmonary effort is normal       Breath sounds: Normal breath sounds  Chest:   Breasts: Breasts are symmetrical       Right: No inverted nipple, mass, nipple discharge, skin change or tenderness  Left: No inverted nipple, mass, nipple discharge, skin change or tenderness  Abdominal:      General: Bowel sounds are normal       Palpations: Abdomen is soft  Tenderness: There is no abdominal tenderness  Hernia: There is no hernia in the left inguinal area or right inguinal area  Genitourinary:     General: Normal vulva  Exam position: Supine  Pubic Area: No rash  Labia:         Right: No rash, tenderness, lesion or injury  Left: No rash, tenderness, lesion or injury  Urethra: No prolapse, urethral pain, urethral swelling or urethral lesion  Vagina: Normal  No signs of injury and foreign body  No vaginal discharge, erythema, tenderness, bleeding, lesions or prolapsed vaginal walls  Adnexa:         Right: No mass, tenderness or fullness            Left: No mass, tenderness or fullness  Rectum: No external hemorrhoid  Comments: Urethra normal without lesions  No bladder tenderness  Cervix, uterus absent, no masses or tenderness on BME  Musculoskeletal:         General: Normal range of motion  Cervical back: Normal range of motion and neck supple  Lymphadenopathy:      Lower Body: No right inguinal adenopathy  No left inguinal adenopathy  Skin:     General: Skin is warm and dry  Neurological:      Mental Status: She is alert and oriented to person, place, and time  Psychiatric:         Speech: Speech normal          Behavior: Behavior normal  Behavior is cooperative

## 2022-10-18 ENCOUNTER — ANNUAL EXAM (OUTPATIENT)
Dept: GYNECOLOGY | Facility: CLINIC | Age: 72
End: 2022-10-18
Payer: MEDICARE

## 2022-10-18 VITALS
SYSTOLIC BLOOD PRESSURE: 150 MMHG | WEIGHT: 160 LBS | BODY MASS INDEX: 28.35 KG/M2 | DIASTOLIC BLOOD PRESSURE: 82 MMHG | HEART RATE: 84 BPM | HEIGHT: 63 IN

## 2022-10-18 DIAGNOSIS — Z12.31 SCREENING MAMMOGRAM FOR BREAST CANCER: ICD-10-CM

## 2022-10-18 DIAGNOSIS — Z13.820 OSTEOPOROSIS SCREENING: ICD-10-CM

## 2022-10-18 DIAGNOSIS — Z78.0 MENOPAUSE: ICD-10-CM

## 2022-10-18 DIAGNOSIS — Z01.419 ENCOUNTER FOR GYNECOLOGICAL EXAMINATION (GENERAL) (ROUTINE) WITHOUT ABNORMAL FINDINGS: ICD-10-CM

## 2022-10-18 DIAGNOSIS — Z01.419 ENCOUNTER FOR PAPANICOLAOU SMEAR OF VAGINA AS PART OF ROUTINE GYNECOLOGICAL EXAMINATION: ICD-10-CM

## 2022-10-18 DIAGNOSIS — Z85.42 HISTORY OF UTERINE CANCER: Primary | ICD-10-CM

## 2022-10-18 DIAGNOSIS — Z12.72 ENCOUNTER FOR PAPANICOLAOU SMEAR OF VAGINA AS PART OF ROUTINE GYNECOLOGICAL EXAMINATION: ICD-10-CM

## 2022-10-18 PROCEDURE — G0145 SCR C/V CYTO,THINLAYER,RESCR: HCPCS | Performed by: OBSTETRICS & GYNECOLOGY

## 2022-10-18 PROCEDURE — G0101 CA SCREEN;PELVIC/BREAST EXAM: HCPCS | Performed by: OBSTETRICS & GYNECOLOGY

## 2022-10-18 RX ORDER — BRIMONIDINE TARTRATE 0.15 %
1 DROPS OPHTHALMIC (EYE) 3 TIMES DAILY
COMMUNITY

## 2022-10-24 LAB
LAB AP GYN PRIMARY INTERPRETATION: NORMAL
Lab: NORMAL

## 2022-10-27 ENCOUNTER — TELEPHONE (OUTPATIENT)
Dept: FAMILY MEDICINE CLINIC | Facility: CLINIC | Age: 72
End: 2022-10-27

## 2022-10-27 NOTE — TELEPHONE ENCOUNTER
She will also stop the Lipitor, states she is using a moisturizer daily and itching daily    She will ask the eye doctor to send over her recommendations for you to review

## 2022-10-27 NOTE — TELEPHONE ENCOUNTER
1)She went to her eye doctor and she suggested to speak with you about a sleep apnea test, and the effects on her glaucoma   2) she has been on Lipitor for one year and is having excessive itching, not all the time

## 2022-10-27 NOTE — TELEPHONE ENCOUNTER
I don't understand the question on the sleep apnea and eye doctor  The eye doctor should be addressing her glaucoma  Did they want her tested for sleep apnea? If so, she would need a visit to discuss  The only way she will know if the itching is from the Lipitor is if she stops it  However, if it is not everyday, it is likely not the Lipitor  This is not a common side effect  I would recommend that she try an antihistamine and make sure she is using a good moisturizer and showers aren't overly hot

## 2022-11-04 ENCOUNTER — HOSPITAL ENCOUNTER (OUTPATIENT)
Dept: BONE DENSITY | Facility: HOSPITAL | Age: 72
Discharge: HOME | End: 2022-11-04

## 2022-11-04 ENCOUNTER — HOSPITAL ENCOUNTER (OUTPATIENT)
Dept: MAMMOGRAPHY | Facility: HOSPITAL | Age: 72
Discharge: HOME | End: 2022-11-04

## 2022-11-04 VITALS — WEIGHT: 160 LBS | BODY MASS INDEX: 28.35 KG/M2 | HEIGHT: 63 IN

## 2022-11-04 DIAGNOSIS — Z12.31 ENCOUNTER FOR SCREENING MAMMOGRAM FOR MALIGNANT NEOPLASM OF BREAST: ICD-10-CM

## 2022-11-04 DIAGNOSIS — Z78.0 POSTMENOPAUSAL: ICD-10-CM

## 2022-11-07 ENCOUNTER — TELEPHONE (OUTPATIENT)
Dept: GYNECOLOGY | Facility: CLINIC | Age: 72
End: 2022-11-07

## 2022-11-07 ENCOUNTER — TELEPHONE (OUTPATIENT)
Dept: FAMILY MEDICINE CLINIC | Facility: CLINIC | Age: 72
End: 2022-11-07

## 2022-11-07 ENCOUNTER — HOSPITAL ENCOUNTER (OUTPATIENT)
Dept: RADIOLOGY | Facility: HOSPITAL | Age: 72
Discharge: HOME | End: 2022-11-07

## 2022-11-07 DIAGNOSIS — R93.7 ABNORMAL FINDINGS ON DIAGNOSTIC IMAGING OF SPINE: Primary | ICD-10-CM

## 2022-11-07 DIAGNOSIS — R93.7 ABNORMAL FINDINGS ON DIAGNOSTIC IMAGING OF SPINE: ICD-10-CM

## 2022-11-07 NOTE — TELEPHONE ENCOUNTER
LM on VM to [facility] - there was an indeterminant finding on DXA at T10 and T11  Please advise pt to follow up with Osman White for further testing  Otherwise, it showed low bone mass

## 2022-11-08 ENCOUNTER — TELEPHONE (OUTPATIENT)
Dept: FAMILY MEDICINE CLINIC | Facility: CLINIC | Age: 72
End: 2022-11-08

## 2022-11-08 NOTE — RESULT ENCOUNTER NOTE
Please notify patient of the following:    Mammogram of both breasts was normal   Repeat routine screening in one year for both breasts

## 2022-11-08 NOTE — TELEPHONE ENCOUNTER
Patient wants to know how much calcium she should be taking  She didn't ask about vitamin d, please advise  She was taking 600 mg BID, then GYN told her to just take it QD, then she had her dexa scan and wants to know what to do now  Please advise

## 2022-12-28 DIAGNOSIS — E78.5 HYPERLIPIDEMIA, UNSPECIFIED HYPERLIPIDEMIA TYPE: ICD-10-CM

## 2022-12-28 RX ORDER — ATORVASTATIN CALCIUM 10 MG/1
10 TABLET, FILM COATED ORAL DAILY
Qty: 90 TABLET | Refills: 0 | Status: SHIPPED | OUTPATIENT
Start: 2022-12-28

## 2023-02-28 DIAGNOSIS — E78.5 HYPERLIPIDEMIA, UNSPECIFIED HYPERLIPIDEMIA TYPE: ICD-10-CM

## 2023-02-28 RX ORDER — ATORVASTATIN CALCIUM 10 MG/1
TABLET, FILM COATED ORAL
Qty: 90 TABLET | Refills: 0 | Status: SHIPPED | OUTPATIENT
Start: 2023-02-28

## 2023-06-05 ENCOUNTER — APPOINTMENT (EMERGENCY)
Dept: CT IMAGING | Facility: HOSPITAL | Age: 73
End: 2023-06-05
Payer: MEDICARE

## 2023-06-05 ENCOUNTER — HOSPITAL ENCOUNTER (EMERGENCY)
Facility: HOSPITAL | Age: 73
Discharge: HOME | End: 2023-06-05
Attending: EMERGENCY MEDICINE
Payer: MEDICARE

## 2023-06-05 VITALS
BODY MASS INDEX: 28.31 KG/M2 | HEART RATE: 73 BPM | OXYGEN SATURATION: 97 % | TEMPERATURE: 98.1 F | SYSTOLIC BLOOD PRESSURE: 149 MMHG | WEIGHT: 159.83 LBS | RESPIRATION RATE: 16 BRPM | DIASTOLIC BLOOD PRESSURE: 65 MMHG

## 2023-06-05 DIAGNOSIS — Z87.19 HISTORY OF RECTAL BLEEDING: ICD-10-CM

## 2023-06-05 DIAGNOSIS — R11.10 VOMITING AND DIARRHEA: ICD-10-CM

## 2023-06-05 DIAGNOSIS — R10.13 EPIGASTRIC PAIN: Primary | ICD-10-CM

## 2023-06-05 DIAGNOSIS — R19.7 VOMITING AND DIARRHEA: ICD-10-CM

## 2023-06-05 LAB
ALBUMIN SERPL BCP-MCNC: 4.2 G/DL (ref 3.5–5)
ALP SERPL-CCNC: 86 U/L (ref 34–104)
ALT SERPL W P-5'-P-CCNC: 24 U/L (ref 7–52)
ANION GAP SERPL CALCULATED.3IONS-SCNC: 9 MMOL/L (ref 4–13)
AST SERPL W P-5'-P-CCNC: 19 U/L (ref 13–39)
BASOPHILS # BLD AUTO: 0.03 THOUSANDS/ÂΜL (ref 0–0.1)
BASOPHILS NFR BLD AUTO: 0 % (ref 0–1)
BILIRUB SERPL-MCNC: 1.02 MG/DL (ref 0.2–1)
BILIRUB UR QL STRIP: NEGATIVE
BUN SERPL-MCNC: 15 MG/DL (ref 5–25)
CALCIUM SERPL-MCNC: 9.2 MG/DL (ref 8.4–10.2)
CARDIAC TROPONIN I PNL SERPL HS: 4 NG/L (ref 8–18)
CHLORIDE SERPL-SCNC: 103 MMOL/L (ref 96–108)
CLARITY UR: CLEAR
CO2 SERPL-SCNC: 27 MMOL/L (ref 21–32)
COLOR UR: YELLOW
CREAT SERPL-MCNC: 0.64 MG/DL (ref 0.6–1.3)
EOSINOPHIL # BLD AUTO: 0.04 THOUSAND/ÂΜL (ref 0–0.61)
EOSINOPHIL NFR BLD AUTO: 1 % (ref 0–6)
ERYTHROCYTE [DISTWIDTH] IN BLOOD BY AUTOMATED COUNT: 13 % (ref 11.6–15.1)
GFR SERPL CREATININE-BSD FRML MDRD: 88 ML/MIN/1.73SQ M
GLUCOSE SERPL-MCNC: 99 MG/DL (ref 65–140)
GLUCOSE UR STRIP-MCNC: NEGATIVE MG/DL
HCT VFR BLD AUTO: 43.9 % (ref 34.8–46.1)
HGB BLD-MCNC: 14.7 G/DL (ref 11.5–15.4)
HGB UR QL STRIP.AUTO: NEGATIVE
HOLD SPECIMEN: NORMAL
HOLD SPECIMEN: NORMAL
IMM GRANULOCYTES # BLD AUTO: 0.01 THOUSAND/UL (ref 0–0.2)
IMM GRANULOCYTES NFR BLD AUTO: 0 % (ref 0–2)
KETONES UR STRIP-MCNC: NEGATIVE MG/DL
LACTATE SERPL-SCNC: 1 MMOL/L (ref 0.5–2)
LEUKOCYTE ESTERASE UR QL STRIP: NEGATIVE
LIPASE SERPL-CCNC: 22 U/L (ref 11–82)
LYMPHOCYTES # BLD AUTO: 1.58 THOUSANDS/ÂΜL (ref 0.6–4.47)
LYMPHOCYTES NFR BLD AUTO: 20 % (ref 14–44)
MAGNESIUM SERPL-MCNC: 1.7 MG/DL (ref 1.9–2.7)
MCH RBC QN AUTO: 28.8 PG (ref 26.8–34.3)
MCHC RBC AUTO-ENTMCNC: 33.5 G/DL (ref 31.4–37.4)
MCV RBC AUTO: 86 FL (ref 82–98)
MONOCYTES # BLD AUTO: 0.59 THOUSAND/ÂΜL (ref 0.17–1.22)
MONOCYTES NFR BLD AUTO: 7 % (ref 4–12)
NEUTROPHILS # BLD AUTO: 5.78 THOUSANDS/ÂΜL (ref 1.85–7.62)
NEUTS SEG NFR BLD AUTO: 72 % (ref 43–75)
NITRITE UR QL STRIP: NEGATIVE
NRBC BLD AUTO-RTO: 0 /100 WBCS
PH UR STRIP.AUTO: 6 [PH]
PLATELET # BLD AUTO: 281 THOUSANDS/UL (ref 149–390)
PMV BLD AUTO: 8.9 FL (ref 8.9–12.7)
POTASSIUM SERPL-SCNC: 3.8 MMOL/L (ref 3.5–5.3)
PROT SERPL-MCNC: 6.4 G/DL (ref 6.4–8.4)
PROT UR STRIP-MCNC: NEGATIVE MG/DL
RBC # BLD AUTO: 5.1 MILLION/UL (ref 3.81–5.12)
SODIUM SERPL-SCNC: 139 MMOL/L (ref 135–147)
SP GR UR STRIP.AUTO: <=1.005 (ref 1–1.03)
UROBILINOGEN UR QL STRIP.AUTO: 0.2 E.U./DL
WBC # BLD AUTO: 8.03 THOUSAND/UL (ref 4.31–10.16)

## 2023-06-05 PROCEDURE — 80053 COMPREHEN METABOLIC PANEL: CPT

## 2023-06-05 PROCEDURE — G1004 CDSM NDSC: HCPCS

## 2023-06-05 PROCEDURE — 85025 COMPLETE CBC W/AUTO DIFF WBC: CPT

## 2023-06-05 PROCEDURE — C9113 INJ PANTOPRAZOLE SODIUM, VIA: HCPCS | Performed by: EMERGENCY MEDICINE

## 2023-06-05 PROCEDURE — 83690 ASSAY OF LIPASE: CPT

## 2023-06-05 PROCEDURE — 84484 ASSAY OF TROPONIN QUANT: CPT | Performed by: EMERGENCY MEDICINE

## 2023-06-05 PROCEDURE — 83605 ASSAY OF LACTIC ACID: CPT | Performed by: EMERGENCY MEDICINE

## 2023-06-05 PROCEDURE — 87040 BLOOD CULTURE FOR BACTERIA: CPT | Performed by: EMERGENCY MEDICINE

## 2023-06-05 PROCEDURE — 36415 COLL VENOUS BLD VENIPUNCTURE: CPT

## 2023-06-05 PROCEDURE — 83735 ASSAY OF MAGNESIUM: CPT | Performed by: EMERGENCY MEDICINE

## 2023-06-05 PROCEDURE — 74177 CT ABD & PELVIS W/CONTRAST: CPT

## 2023-06-05 PROCEDURE — 81003 URINALYSIS AUTO W/O SCOPE: CPT | Performed by: EMERGENCY MEDICINE

## 2023-06-05 RX ORDER — OMEPRAZOLE 40 MG/1
40 CAPSULE, DELAYED RELEASE ORAL DAILY
Qty: 20 CAPSULE | Refills: 0 | Status: SHIPPED | OUTPATIENT
Start: 2023-06-05 | End: 2023-06-25

## 2023-06-05 RX ORDER — MAGNESIUM SULFATE HEPTAHYDRATE 40 MG/ML
2 INJECTION, SOLUTION INTRAVENOUS ONCE
Status: COMPLETED | OUTPATIENT
Start: 2023-06-05 | End: 2023-06-05

## 2023-06-05 RX ORDER — MAGNESIUM SULFATE HEPTAHYDRATE 40 MG/ML
2 INJECTION, SOLUTION INTRAVENOUS ONCE
Status: DISCONTINUED | OUTPATIENT
Start: 2023-06-05 | End: 2023-06-05

## 2023-06-05 RX ORDER — PANTOPRAZOLE SODIUM 40 MG/10ML
40 INJECTION, POWDER, LYOPHILIZED, FOR SOLUTION INTRAVENOUS ONCE
Status: COMPLETED | OUTPATIENT
Start: 2023-06-05 | End: 2023-06-05

## 2023-06-05 RX ORDER — ONDANSETRON 4 MG/1
4 TABLET, ORALLY DISINTEGRATING ORAL EVERY 8 HOURS PRN
Qty: 20 TABLET | Refills: 0 | Status: SHIPPED | OUTPATIENT
Start: 2023-06-05

## 2023-06-05 RX ORDER — ONDANSETRON 2 MG/ML
4 INJECTION INTRAMUSCULAR; INTRAVENOUS ONCE
Status: COMPLETED | OUTPATIENT
Start: 2023-06-05 | End: 2023-06-05

## 2023-06-05 RX ADMIN — IOHEXOL 100 ML: 350 INJECTION, SOLUTION INTRAVENOUS at 14:44

## 2023-06-05 RX ADMIN — MAGNESIUM SULFATE HEPTAHYDRATE 2 G: 40 INJECTION, SOLUTION INTRAVENOUS at 14:42

## 2023-06-05 RX ADMIN — PANTOPRAZOLE SODIUM 40 MG: 40 INJECTION, POWDER, FOR SOLUTION INTRAVENOUS at 13:56

## 2023-06-05 RX ADMIN — SODIUM CHLORIDE, SODIUM LACTATE, POTASSIUM CHLORIDE, AND CALCIUM CHLORIDE 1000 ML: .6; .31; .03; .02 INJECTION, SOLUTION INTRAVENOUS at 13:55

## 2023-06-05 RX ADMIN — ONDANSETRON 4 MG: 2 INJECTION INTRAMUSCULAR; INTRAVENOUS at 13:56

## 2023-06-05 NOTE — DISCHARGE INSTRUCTIONS
Plenty of fluids  [place] diet: bannas rice applesauce toast, scrambled eggs  Avoid raw veggies, fiber, peas corn for 1 week as harder for colon to digest  Zofran as needed for nausea  Omeprazole fill script or take 2-20mg tabs over the counter daily  Probiotic over the counter  Return with fever, worsening or change in abdominal pain, rectal bleeding, inability to keep fliuds down chest pain shortness of breath or any new or worsening symptoms

## 2023-06-05 NOTE — ED NOTES
Rectal exam being completed by provider with myself at bedside  Patient tolerated well         Kelsey Inman RN  06/05/23 [id number]

## 2023-06-05 NOTE — ED PROVIDER NOTES
History  Chief Complaint   Patient presents with   • Abdominal Pain     Diarrhea upper abdominal pain sweats chills and vomiting for 4 days     68 yo female was in her usual state of health until the evening of 2023 when she had acute onset of upper abdominal pain followed by bloody diarrhea she describes bright red blood loose stool she denies any brownish element to her stools completed by vomiting chills and sweats  Had ongoing abdominal pain from her bellybutton to the ribs  She denies lower quadrant abdominal pain she last threw up 3 days ago with onset of the symptoms been able to tolerate water and some prescription some eggs and crackers  She continues with diarrhea her last stool was yesterday  She does feel bloated  She is having pain with bowel movements  Denies any trauma or falls  No lightheadedness no chest pain shortness of breath no cough or upper respiratory complaints  Patient is not anticoagulated  She has had no heartburn  Been urinating normally  Denies prior history of ulcerative colitis or Crohn's she does have some scant hemorrhoids there is been no other sick contacts no antibiotics no camping or exposure [place] no travel except for going to [place] in April she did experience some nausea at that time last colonoscopy was in  she had a polyp removed  Past medical history is significant for hyperlipidemia kidney stones urinary tract infections osteoporosis and endometrial cancer  Surgical history hysterectomy with BSO in  previous to that  x2 she had a colonoscopy in           Prior to Admission Medications   Prescriptions Last Dose Informant Patient Reported? Taking?    Calcium Citrate-Vitamin D (CALCIUM CITRATE + D PO)  Self Yes No   Sig: Take by mouth   Omega-3 Fatty Acids (FISH OIL PO)  Self Yes No   Sig: Take 2 g by mouth   SM VITAMIN D3 4000 units CAPS  Self Yes No   Sig: Take 2,000 Units by mouth daily    atorvastatin (LIPITOR) 10 mg tablet   No No   Sig: TAKE 1 TABLET BY MOUTH DAILY   brimonidine (ALPHAGAN P) 0 15 % ophthalmic solution   Yes No   Si drop 2 (two) times a day   carboxymethylcellulose 0 5 % SOLN   Yes No   Si drop 3 (three) times a day as needed for dry eyes   cycloSPORINE (RESTASIS) 0 05 % ophthalmic emulsion  Self Yes No   Sig: Apply 1 drop to eye   glucosamine-chondroitin 500-400 MG tablet  Self Yes No   Sig: Take 1 tablet by mouth   mometasone (NASONEX) 50 mcg/act nasal spray  Self Yes No   Sig: SHAKE LQ AND U 2 SPRAYS IEN D   multivitamin (THERAGRAN) TABS  Self Yes No   Sig: Take 1 tablet by mouth      Facility-Administered Medications: None       Past Medical History:   Diagnosis Date   • Achilles tendon tear ,    bilateral   • Allergic rhinitis due to pollen    • Anxiety    • Depression    • HPV (human papilloma virus) infection    • Hyperlipidemia    • Kidney stones     renal calculi   • Mass of neck    • Osteoporosis    • Uterine cancer ([facility] [address] )    • UTI (urinary tract infection)        Past Surgical History:   Procedure Laterality Date   •  SECTION  4733,9748   • COLONOSCOPY     • DXA PROCEDURE (HISTORICAL)     • HYSTERECTOMY Bilateral     salpino-ooph   • OOPHORECTOMY Bilateral    • TENDON REPAIR  ,    Archilles tendon repair-bilateral       Family History   Adopted: Yes   Problem Relation Age of Onset   • Breast cancer Daughter 39   • Parkinsonism Other    • Seizures Other      I have reviewed and agree with the history as documented  E-Cigarette/Vaping   • E-Cigarette Use Never User      E-Cigarette/Vaping Substances     Social History     Tobacco Use   • Smoking status: Never   • Smokeless tobacco: Never   Vaping Use   • Vaping Use: Never used   Substance Use Topics   • Alcohol use:  Yes     Alcohol/week: 2 0 standard drinks of alcohol     Types: 1 Glasses of wine, 1 Cans of beer per week     Comment: social   • Drug use: Never       Review of Systems Constitutional: Positive for activity change, appetite change, chills and diaphoresis  Negative for fever  HENT: Negative for congestion, ear pain, rhinorrhea, sneezing and sore throat  Eyes: Negative for discharge  Respiratory: Negative for cough and shortness of breath  Cardiovascular: Negative for chest pain and leg swelling  Gastrointestinal: Positive for abdominal pain, blood in stool, diarrhea, nausea and vomiting  Endocrine: Negative for polyuria  Genitourinary: Negative for difficulty urinating, dysuria, frequency and urgency  Musculoskeletal: Negative for arthralgias, back pain and myalgias  Skin: Negative for rash  Neurological: Negative for dizziness, weakness, light-headedness, numbness and headaches  Hematological: Negative for adenopathy  Psychiatric/Behavioral: Negative for confusion  All other systems reviewed and are negative  Physical Exam  Physical Exam  Vitals and nursing note reviewed  Exam conducted with a chaperone present  Constitutional:       General: She is not in acute distress  Appearance: She is well-developed  She is not ill-appearing, toxic-appearing or diaphoretic  HENT:      Head: Normocephalic and atraumatic  Right Ear: External ear normal       Left Ear: External ear normal       Nose: Nose normal       Mouth/Throat:      Mouth: Mucous membranes are moist       Pharynx: No oropharyngeal exudate or posterior oropharyngeal erythema  Eyes:      General:         Right eye: No discharge  Left eye: No discharge  Extraocular Movements: Extraocular movements intact  Conjunctiva/sclera: Conjunctivae normal       Pupils: Pupils are equal, round, and reactive to light  Neck:      Comments: No midline or paraspinous tenderness  Cardiovascular:      Rate and Rhythm: Normal rate and regular rhythm  Pulses: Normal pulses  Heart sounds: Normal heart sounds     Pulmonary:      Effort: Pulmonary effort is normal  No respiratory distress  Breath sounds: Normal breath sounds  No wheezing or rales  Abdominal:      General: Bowel sounds are normal  There is no distension  Palpations: Abdomen is soft  Tenderness: There is abdominal tenderness (epigastric region)  There is no right CVA tenderness, left CVA tenderness, guarding or rebound  Comments: No Mosqueda's sign; Back no midline or CVA tenderness   Genitourinary:     Rectum: Normal       Comments: Rectal exam chaparoned by Patricia RN; normal tone non thrombosed hemorrhoid; rectal vault empty heme negative controls intact  Musculoskeletal:         General: No tenderness or deformity  Normal range of motion  Cervical back: Normal range of motion  No rigidity or tenderness  Right lower leg: No edema  Left lower leg: No edema  Skin:     General: Skin is warm and dry  Neurological:      Mental Status: She is alert and oriented to person, place, and time  Cranial Nerves: No cranial nerve deficit  Sensory: No sensory deficit  Motor: No weakness or abnormal muscle tone        Coordination: Coordination normal       Comments: Gait steady   Psychiatric:         Mood and Affect: Mood normal          Vital Signs  ED Triage Vitals [06/05/23 1310]   Temperature Pulse Respirations Blood Pressure SpO2   98 1 °F (36 7 °C) 95 18 145/88 94 %      Temp Source Heart Rate Source Patient Position - Orthostatic VS BP Location FiO2 (%)   Temporal Monitor Sitting Left arm --      Pain Score       4           Vitals:    06/05/23 1430 06/05/23 1515 06/05/23 1615 06/05/23 1700   BP: 126/60 145/64 120/57 149/65   Pulse: 66 68 68 73   Patient Position - Orthostatic VS: Sitting Lying Lying Lying         Visual Acuity      ED Medications  Medications   lactated ringers bolus 1,000 mL (0 mL Intravenous Stopped 6/5/23 1444)   ondansetron (ZOFRAN) injection 4 mg (4 mg Intravenous Given 6/5/23 1356)   pantoprazole (PROTONIX) injection 40 mg (40 mg Intravenous Given 6/5/23 1356)   magnesium sulfate 2 g/50 mL IVPB (premix) 2 g (0 g Intravenous Stopped 6/5/23 1547)   iohexol (OMNIPAQUE) 350 MG/ML injection (SINGLE-DOSE) 100 mL (100 mL Intravenous Given 6/5/23 1444)       Diagnostic Studies  Results Reviewed     Procedure Component Value Units Date/Time    Blood culture #1 [[id number]] Collected: 06/05/23 1409    Lab Status: Preliminary result Specimen: Blood from Arm, Right Updated: 06/05/23 2301     Blood Culture Received in Microbiology Lab  Culture in Progress  Blood culture #2 [[id number]] Collected: 06/05/23 1348    Lab Status: Preliminary result Specimen: Blood from Arm, Left Updated: 06/05/23 2301     Blood Culture Received in Microbiology Lab  Culture in Progress  UA w Reflex to Microscopic w Reflex to Culture [[id number]] Collected: 06/05/23 1518    Lab Status: Final result Specimen: Urine, Clean Catch Updated: 06/05/23 1526     Color, UA Yellow     Clarity, UA Clear     Specific Gravity, UA <=1 005     pH, UA 6 0     Leukocytes, UA Negative     Nitrite, UA Negative     Protein, UA Negative mg/dl      Glucose, UA Negative mg/dl      Ketones, UA Negative mg/dl      Urobilinogen, UA 0 2 E U /dl      Bilirubin, UA Negative     Occult Blood, UA Negative    [place] draw [[id number]] Collected: 06/05/23 1345    Lab Status: In process Specimen: Blood from Arm, Left Updated: 06/05/23 1501    Narrative: The following orders were created for panel order [place] draw    Procedure                               Abnormality         Status                     ---------                               -----------         ------                     Merrilyn Bunting Top on TETC[[id number]]                           Final result               Gold top on IZTB[[id number]]                                 Final result               Green / Yellow tube on QBBA[[id number]]                                                 Green / Black tube on PNIS[[id number]]                       Final result Lavender Top 3 ml on HVJK[[id number]]                        Final result               Lavender Top 7ml on UXVQ[[id number]]                         In process                   Please view results for these tests on the individual orders      Magnesium [[id number]]  (Abnormal) Collected: 06/05/23 1429    Lab Status: Final result Specimen: Blood Updated: 06/05/23 1429     Magnesium 1 7 mg/dL     Comprehensive metabolic panel [[id number]]  (Abnormal) Collected: 06/05/23 1345    Lab Status: Final result Specimen: Blood from Arm, Left Updated: 06/05/23 1423     Sodium 139 mmol/L      Potassium 3 8 mmol/L      Chloride 103 mmol/L      CO2 27 mmol/L      ANION GAP 9 mmol/L      BUN 15 mg/dL      Creatinine 0 64 mg/dL      Glucose 99 mg/dL      Calcium 9 2 mg/dL      AST 19 U/L      ALT 24 U/L      Alkaline Phosphatase 86 U/L      Total Protein 6 4 g/dL      Albumin 4 2 g/dL      Total Bilirubin 1 02 mg/dL      eGFR 88 ml/min/1 73sq m     Narrative:      [place] guidelines for Chronic Kidney Disease (CKD):   •  Stage 1 with normal or high GFR (GFR > 90 mL/min/1 73 square meters)  •  Stage 2 Mild CKD (GFR = 60-89 mL/min/1 73 square meters)  •  Stage 3A Moderate CKD (GFR = 45-59 mL/min/1 73 square meters)  •  Stage 3B Moderate CKD (GFR = 30-44 mL/min/1 73 square meters)  •  Stage 4 Severe CKD (GFR = 15-29 mL/min/1 73 square meters)  •  Stage 5 End Stage CKD (GFR <15 mL/min/1 73 square meters)  Note: GFR calculation is accurate only with a steady state creatinine    Lipase [[id number]]  (Normal) Collected: 06/05/23 1345    Lab Status: Final result Specimen: Blood from Arm, Left Updated: 06/05/23 1423     Lipase 22 u/L     High Sensitivity Troponin I Random [[id number]]  (Abnormal) Collected: 06/05/23 1349    Lab Status: Final result Specimen: Blood from Arm, Left Updated: 06/05/23 1421     HS TnI random 4 ng/L     Lactic acid, plasma (w/reflex if result > 2 0) [[id number]]  (Normal) Collected: 06/05/23 1348    Lab Status: Final result Specimen: Blood from Arm, Left Updated: 06/05/23 1421     LACTIC ACID 1 0 mmol/L     Narrative:      Result may be elevated if tourniquet was used during collection  CBC and differential [[id number]] Collected: 06/05/23 1345    Lab Status: Final result Specimen: Blood from Arm, Left Updated: 06/05/23 1359     WBC 8 03 Thousand/uL      RBC 5 10 Million/uL      Hemoglobin 14 7 g/dL      Hematocrit 43 9 %      MCV 86 fL      MCH 28 8 pg      MCHC 33 5 g/dL      RDW 13 0 %      MPV 8 9 fL      Platelets 205 Thousands/uL      nRBC 0 /100 WBCs      Neutrophils Relative 72 %      Immat GRANS % 0 %      Lymphocytes Relative 20 %      Monocytes Relative 7 %      Eosinophils Relative 1 %      Basophils Relative 0 %      Neutrophils Absolute 5 78 Thousands/µL      Immature Grans Absolute 0 01 Thousand/uL      Lymphocytes Absolute 1 58 Thousands/µL      Monocytes Absolute 0 59 Thousand/µL      Eosinophils Absolute 0 04 Thousand/µL      Basophils Absolute 0 03 Thousands/µL                  CT abdomen pelvis with contrast   Final Result by Ben Orellana MD (06/05 1555)      No acute abnormality in the abdomen or pelvis              Workstation performed: [id number]                    Procedures  ECG 12 Lead Documentation Only    Date/Time: 6/5/2023 4:48 PM    Performed by: Christopher Lujan MD  Authorized by: Christopher Lujan MD    Indications / Diagnosis:  Epigastric pain  ECG reviewed by me, the ED Provider: yes    Patient location:  ED  Previous ECG:     Previous ECG:  Compared to current    Comparison ECG info:  5/12/23 1726    Similarity:  Changes noted  Rate:     ECG rate:  69    ECG rate assessment: normal    Rhythm:     Rhythm: sinus rhythm    QRS:     QRS axis:  Normal  Comments:      Nonspecific twi AVL             ED Course  ED Course as of 06/05/23 5420   Mon Jun 05, 2023   1701 Extensively discussed lab findings as well as CT abdomen pelvis results exact etiology of her epigastric pain was not determined no CT evidence of colitis diverticulitis  Rectal exam is guaiac negative  Recommend outpatient follow-up with gastroenterology and proton pump inhibitor Zofran as needed bland diet and probiotics  Patient's not demonstrating any evidence of acute coronary syndrome  HEART Risk Score    Flowsheet Row Most Recent Value   Heart Score Risk Calculator    History 1 Filed at: 06/05/2023 1646   ECG 0 Filed at: 06/05/2023 1646   Age 2 Filed at: 06/05/2023 1646   Risk Factors 1 Filed at: 06/05/2023 1646   Troponin 0 Filed at: 06/05/2023 1646   HEART Score 4 Filed at: 06/05/2023 1646                        SBIRT 20yo+    Flowsheet Row Most Recent Value   Initial Alcohol Screen: US AUDIT-C     1  How often do you have a drink containing alcohol? 0 Filed at: 06/05/2023 1718   2  How many drinks containing alcohol do you have on a typical day you are drinking? 0 Filed at: 06/05/2023 1718   3a  Male UNDER 65: How often do you have five or more drinks on one occasion? 0 Filed at: 06/05/2023 1718   3b  FEMALE Any Age, or MALE 65+: How often do you have 4 or more drinks on one occassion? 0 Filed at: 06/05/2023 1718   Audit-C Score 0 Filed at: 06/05/2023 1718   ALTAGRACIA: How many times in the past year have you    Used an illegal drug or used a prescription medication for non-medical reasons? Never Filed at: 06/05/2023 1718                    Medical Decision Making  Mdm: 66 yo female with ongoing epigastric tenderenss and hx of bloody stools/colitis without prior hx of inflammatory bowel disesae; clinically patient appears to be improving  Will check CBC for anemia leukocytosis, evaluate for an electrolyte disturbance  Check for pancreatitis  evaluate with an EKG for arrhythmia or acute coronary syndrome due to the fact she is having epigastric pain vomiting and history of sweats    Initiate symptomatic management IV fluid hydration, pursue CT abdomen pelvis to evaluate for possibility of diverticulitis, colitis and re-evalutate      Colonoscopy 2021:FINDINGS  Normal ileocecal valve and appendiceal orifice  One polyp measuring smaller than 5 mm in the sigmoid colon; removed by cold snare  Recommend repeat colonoscopy in 5 yrs    Amount and/or Complexity of Data Reviewed  Labs: ordered  Radiology: ordered  Risk  Prescription drug management  Disposition  Final diagnoses:   Epigastric pain   Vomiting and diarrhea   History of rectal bleeding     Time reflects when diagnosis was documented in both MDM as applicable and the Disposition within this note     Time User Action Codes Description Comment    6/5/2023  5:02 PM Anjana Robin Add [R10 13] Epigastric pain     6/5/2023  5:03 PM Anjana Robin Add [R11 10,  R19 7] Vomiting and diarrhea     6/5/2023  5:03 PM Anjana Robin Add [Z87 19] History of rectal bleeding       ED Disposition     ED Disposition   Discharge    Condition   Stable    Date/Time   Mon Jun 5, 2023  5:02 PM    Comment   Joann Porras discharge to home/self care                 Follow-up Information     Follow up With Specialties Details Why Contact Info Additional Information    Jackie Handley PA-C Family Medicine, Physician Assistant  if not improved by later this week [address]       [facility] Call in 1 day recheck of symptoms [address] [address]  Garrett Joseph [facility] Aubrey Peralta [address] [place] [address], [address] [place] [address] 3          Discharge Medication List as of 6/5/2023  5:12 PM      START taking these medications    Details   omeprazole (PriLOSEC) 40 MG capsule Take 1 capsule (40 mg total) by mouth daily for 20 doses, Starting Mon 6/5/2023, Until Sun 6/25/2023, Normal      ondansetron (ZOFRAN-ODT) 4 mg disintegrating tablet Take 1 tablet (4 mg total) by mouth every 8 (eight) hours as needed for nausea or vomiting for up to 20 doses, Starting Mon 6/5/2023, Normal         CONTINUE these medications which have NOT CHANGED    Details   atorvastatin (LIPITOR) 10 mg tablet TAKE 1 TABLET BY MOUTH DAILY, Normal      brimonidine (ALPHAGAN P) 0 15 % ophthalmic solution 1 drop 2 (two) times a day, Historical Med      Calcium Citrate-Vitamin D (CALCIUM CITRATE + D PO) Take by mouth, Historical Med      carboxymethylcellulose 0 5 % SOLN 1 drop 3 (three) times a day as needed for dry eyes, Historical Med      cycloSPORINE (RESTASIS) 0 05 % ophthalmic emulsion Apply 1 drop to eye, Historical Med      glucosamine-chondroitin 500-400 MG tablet Take 1 tablet by mouth, Historical Med      mometasone (NASONEX) 50 mcg/act nasal spray SHAKE LQ AND U 2 SPRAYS IEN D, Historical Med      multivitamin (THERAGRAN) TABS Take 1 tablet by mouth, Historical Med      Omega-3 Fatty Acids (FISH OIL PO) Take 2 g by mouth, Historical Med      SM VITAMIN D3 4000 units CAPS Take 2,000 Units by mouth daily , Historical Med                 PDMP Review     None          ED Provider  Electronically Signed by           Natasha Jackson MD  06/05/23 2073

## 2023-06-07 DIAGNOSIS — E78.5 HYPERLIPIDEMIA, UNSPECIFIED HYPERLIPIDEMIA TYPE: ICD-10-CM

## 2023-06-07 RX ORDER — ATORVASTATIN CALCIUM 10 MG/1
TABLET, FILM COATED ORAL
Qty: 90 TABLET | Refills: 0 | Status: SHIPPED | OUTPATIENT
Start: 2023-06-07

## 2023-06-10 LAB
BACTERIA BLD CULT: NORMAL
BACTERIA BLD CULT: NORMAL

## 2023-06-23 ENCOUNTER — CONSULT (OUTPATIENT)
Dept: GASTROENTEROLOGY | Facility: CLINIC | Age: 73
End: 2023-06-23
Payer: MEDICARE

## 2023-06-23 VITALS
SYSTOLIC BLOOD PRESSURE: 127 MMHG | HEIGHT: 63 IN | TEMPERATURE: 96.5 F | WEIGHT: 156 LBS | RESPIRATION RATE: 16 BRPM | BODY MASS INDEX: 27.64 KG/M2 | OXYGEN SATURATION: 97 % | DIASTOLIC BLOOD PRESSURE: 56 MMHG | HEART RATE: 75 BPM

## 2023-06-23 DIAGNOSIS — R11.10 VOMITING AND DIARRHEA: ICD-10-CM

## 2023-06-23 DIAGNOSIS — R10.13 EPIGASTRIC PAIN: Primary | ICD-10-CM

## 2023-06-23 DIAGNOSIS — Z87.19 HISTORY OF RECTAL BLEEDING: ICD-10-CM

## 2023-06-23 DIAGNOSIS — R19.7 VOMITING AND DIARRHEA: ICD-10-CM

## 2023-06-23 DIAGNOSIS — Z86.010 PERSONAL HISTORY OF COLONIC POLYPS: ICD-10-CM

## 2023-06-23 PROCEDURE — 99204 OFFICE O/P NEW MOD 45 MIN: CPT | Performed by: NURSE PRACTITIONER

## 2023-06-23 RX ORDER — POLYETHYLENE GLYCOL 3350, SODIUM CHLORIDE, SODIUM BICARBONATE, POTASSIUM CHLORIDE 420; 11.2; 5.72; 1.48 G/4L; G/4L; G/4L; G/4L
4000 POWDER, FOR SOLUTION ORAL ONCE
Qty: 4000 ML | Refills: 0 | Status: SHIPPED | OUTPATIENT
Start: 2023-06-23 | End: 2023-06-23

## 2023-06-23 RX ORDER — OMEPRAZOLE 40 MG/1
CAPSULE, DELAYED RELEASE ORAL
Qty: 90 CAPSULE | Refills: 1 | Status: SHIPPED | OUTPATIENT
Start: 2023-06-23

## 2023-06-23 RX ORDER — OMEPRAZOLE 40 MG/1
CAPSULE, DELAYED RELEASE ORAL
Qty: 90 CAPSULE | Refills: 1 | Status: SHIPPED | OUTPATIENT
Start: 2023-06-23 | End: 2023-06-23

## 2023-06-23 NOTE — PROGRESS NOTES
[facility] - Outpatient Consultation  Morena Puentes 67 y o  female MRN: [id number]  Encounter: [id number]          ASSESSMENT AND PLAN:    The patient presents today for an initial consultation for her epigastric pain that is intermittent and she reports over the course of her life she has dealt with some abdominal pain and GI distress intermittently, but over the past 2 months it has definitely been worse and changing reports that her pain is always between the bottom of her rib cage and her navel  She reports multiple instances of painful diarrhea, with quick onset and typically quick relief when she has evacuated her bowels  She reports that if she uses Pepto-Bismol she finds that it causes constipation  She also reports at least 2 or more instances of rectal bleeding, especially with constipation and straining which she feels is most likely her hemorrhoids  She also reports a few instances of nausea and vomiting  The patient reports that the pain became so severe that on June 5, 2023 she did to the ER for evaluation and a CT the abdomen and pelvis with contrast was performed without any etiology that would explain her pain or symptoms  GI Exam:  Oral mucosa normal upon visual inspection, without any sores, lesions, or ulcerations  Sclera without icterus and benign  Lung sounds CTA b/l  Normal S1 & S2 upon exam  Abdomen is soft and mild to moderately tender in the upper quadrants and epigastric areas  No edema noted of the b/l lower extremities noted upon exam today  Skin is non-icteric  1  Epigastric pain  2   Vomiting and diarrhea  While the patient was in the office today, I did discuss with the patient that at this point time with the changes in her bowel habits, nausea, vomiting and continued epigastric pain that has worsened over the past 2 months, I feel it is in her best interest to proceed with an EGD to further evaluate any other underlying etiology that could explain her pain, which could include a hiatal hernia, H  Pylori, or GERD  The patient was agreeable and verbalized an understanding  I discussed the risks of procedure with the patient including, but not limited to: bleeding, infection, sore throat, and perforation  The patient gave verbal understanding and is agreeable to proceed  In the meantime I am asked going to increase the omeprazole to 40 mg a day to try to ease her epigastric pain and until we know the exact cause of underlying etiology  - Ambulatory Referral to Gastroenterology  - omeprazole (PriLOSEC) 40 MG capsule; Take 1 PO QD 30 mins prior to a meal   Dispense: 90 capsule; Refill: 1  - EGD; Future    3  History of rectal bleeding  4  Personal history of colonic polyps  I discussed with the patient that with her recent instances of rectal bleeding and changes in her bowel habits, with worsening symptoms and pain, I feel it is appropriate to proceed with a colonoscopy to evaluate for any underlying cause of her pain  The patient was agreeable and verbalized an understanding  I discussed the risks of procedure with the patient including, but not limited to: bleeding, infection, perforation, and spleen injury  The patient gave verbal understanding and is agreeable to proceed  Bowel prep instructions were reviewed and given as ordered  - Colonoscopy; Future  - polyethylene glycol-electrolytes (TriLyte) 4000 mL solution; Take 4,000 mL by mouth once for 1 dose Take 4000 mL by mouth once for 1 dose  Use as directed  Dispense: 4000 mL; Refill: 0    The patient will schedule a follow up office visit after her procedures and is to call our office if she has any worsening symptoms or issues   The patient was agreeable and verbalized an understanding      ______________________________________________________________________    HPI: The patient is a 67 y o  female who presents today for a consultation regarding her epigastric pain that is intermittent and she reports over the course of her life she has dealt with some abdominal pain and GI distress intermittently, but over the past 2 months it has definitely been worse and changing reports that her pain is always between the bottom of her rib cage and her navel  She reports multiple instances of painful diarrhea, with quick onset and typically quick relief when she has evacuated her bowels  She reports that if she uses Pepto-Bismol she finds that it causes constipation  She also reports at least 2 or more instances of rectal bleeding, especially with constipation and straining which she feels is most likely her hemorrhoids  She also reports a few instances of nausea and vomiting  The patient reports that the pain became so severe that on June 5, 2023 she did to the ER for evaluation and a CT the abdomen and pelvis with contrast was performed without any etiology that would explain her pain or symptoms  The patient denies any reflux, chest pain, nausea, dysphagia, shortness of breath, early satiety, decreased appetite, or unplanned weight loss  Water Intake: 6-7 glasses/day  The patient reports that they have consistent, relieving, and regular bowel movements on a daily basis without any bloating, consistent diarrhea, nocturnal BMs, or melena  [place]: 4  Last BM: Today  Flatus: Yes, normal      The patient denies any evidence of jaundice, fevers, gracie colored stools, swelling of the lower extremities, fatigue, confusion, memory loss or easy bruising      PMH/PSH:    Abdominal/Chest Surgery: 2 C-Sections 76/79  Colon Cancer: Denied  Any Cancer: Uterine Cancer  Pre-Cancerous Polyps: Polyps  Crohn's: Denie  Ulcerative Colitis: Denied      Tobacco/Vaping: Denied  ETOH: Occasional, 1 glass of wine  Marijuana: Denied  Illicit Drug Use: Denied    FH:  Colon Cancer: Denied  Any Cancer: Daughter, Breast Ca  Family Members with Pre-Cancerous Polyps: Denied  Crohn's: Denied  Ulcerative Colitis: Denied    Serology: (23): Total bilirubin 1 02, otherwise the rest of the blood work was WNL  Meds: Omeprazole 20 mg daily  NSAID Use: Denied    Imaging: (23): No acute abnormality in the abdomen or pelvis  Endoscopy History: EGD: (None)     COLONOSCOPY: (21): Single small polyp  Recommend repeat colonoscopy in 5 years due to a personal history of colon polyps  Patho: benign  DUE: 26    REVIEW OF SYSTEMS:    CONSTITUTIONAL: Denies any fever, chills, rigors, and weight loss  HEENT: No earache or tinnitus  Denies hearing loss or visual disturbances  CARDIOVASCULAR: No chest pain or palpitations  RESPIRATORY: Denies any cough, hemoptysis, shortness of breath or dyspnea on exertion  GASTROINTESTINAL: As noted in the History of Present Illness  GENITOURINARY: No problems with urination  Denies any hematuria or dysuria  NEUROLOGIC: No dizziness or vertigo, denies headaches  MUSCULOSKELETAL: Denies any muscle or joint pain  SKIN: Denies skin rashes or itching  ENDOCRINE: Denies excessive thirst  Denies intolerance to heat or cold  PSYCHOSOCIAL: Denies depression or anxiety  Denies any recent memory loss         Historical Information   Past Medical History:   Diagnosis Date   • Achilles tendon tear ,    bilateral   • Allergic rhinitis due to pollen    • Anxiety    • Depression    • HPV (human papilloma virus) infection    • Hyperlipidemia    • Kidney stones     renal calculi   • Mass of neck    • Osteoporosis    • Uterine cancer ([facility] [address] )    • UTI (urinary tract infection)      Past Surgical History:   Procedure Laterality Date   •  SECTION  0594,6669   • COLONOSCOPY     • DXA PROCEDURE (HISTORICAL)     • HYSTERECTOMY Bilateral     salpino-ooph   • OOPHORECTOMY Bilateral    • TENDON REPAIR  ,    Archilles tendon repair-bilateral     Social History   Social History     Substance and Sexual Activity   Alcohol Use Yes   • Alcohol/week: 2 0 "standard drinks of alcohol   • Types: 1 Glasses of wine, 1 Cans of beer per week    Comment: social     Social History     Substance and Sexual Activity   Drug Use Never     Social History     Tobacco Use   Smoking Status Never   Smokeless Tobacco Never     Family History   Adopted: Yes   Problem Relation Age of Onset   • Breast cancer Daughter 39   • Parkinsonism Other    • Seizures Other        Meds/Allergies       Current Outpatient Medications:   •  atorvastatin (LIPITOR) 10 mg tablet  •  brimonidine (ALPHAGAN P) 0 15 % ophthalmic solution  •  Calcium Citrate-Vitamin D (CALCIUM CITRATE + D PO)  •  carboxymethylcellulose 0 5 % SOLN  •  cycloSPORINE (RESTASIS) 0 05 % ophthalmic emulsion  •  glucosamine-chondroitin 500-400 MG tablet  •  mometasone (NASONEX) 50 mcg/act nasal spray  •  multivitamin (THERAGRAN) TABS  •  Omega-3 Fatty Acids (FISH OIL PO)  •  omeprazole (PriLOSEC) 40 MG capsule  •  ondansetron (ZOFRAN-ODT) 4 mg disintegrating tablet  •  polyethylene glycol-electrolytes (TriLyte) 4000 mL solution  •  SM VITAMIN D3 4000 units CAPS    Allergies   Allergen Reactions   • Cefaclor Other (See Comments)     swelling and itching of hands and feet   • Cephalosporins Itching   • Prednisone Other (See Comments)     disconnected feeling           Objective     Blood pressure 127/56, pulse 75, temperature (!) 96 5 °F (35 8 °C), temperature source Tympanic, resp  rate 16, height 5' 2 5\" (1 588 m), weight 70 8 kg (156 lb), SpO2 97 %  Body mass index is 28 08 kg/m²  PHYSICAL EXAM:      General Appearance:   Alert, cooperative, no distress   HEENT:   Normocephalic, atraumatic, anicteric  Neck:  Supple, symmetrical, trachea midline   Lungs:   Clear to auscultation bilaterally; no rales, rhonchi or wheezing; respirations unlabored    Heart[de-identified]   Regular rate and rhythm; no murmur, rub, or gallop     Abdomen:   Soft, non-tender, non-distended; normal bowel sounds; no masses, no organomegaly    Genitalia:   Deferred  " Rectal:   Deferred    Extremities:  No cyanosis, clubbing or edema    Pulses:  2+ and symmetric    Skin:  No jaundice, rashes, or lesions    Lymph nodes:  No palpable cervical lymphadenopathy        Lab Results:   No visits with results within 1 Day(s) from this visit  Latest known visit with results is:   Admission on 06/05/2023, Discharged on 06/05/2023   Component Date Value   • WBC 06/05/2023 8 03    • RBC 06/05/2023 5 10    • Hemoglobin 06/05/2023 14 7    • Hematocrit 06/05/2023 43 9    • MCV 06/05/2023 86    • MCH 06/05/2023 28 8    • MCHC 06/05/2023 33 5    • RDW 06/05/2023 13 0    • MPV 06/05/2023 8 9    • Platelets 19/42/5437 281    • nRBC 06/05/2023 0    • Neutrophils Relative 06/05/2023 72    • Immat GRANS % 06/05/2023 0    • Lymphocytes Relative 06/05/2023 20    • Monocytes Relative 06/05/2023 7    • Eosinophils Relative 06/05/2023 1    • Basophils Relative 06/05/2023 0    • Neutrophils Absolute 06/05/2023 5 78    • Immature Grans Absolute 06/05/2023 0 01    • Lymphocytes Absolute 06/05/2023 1 58    • Monocytes Absolute 06/05/2023 0 59    • Eosinophils Absolute 06/05/2023 0 04    • Basophils Absolute 06/05/2023 0 03    • Sodium 06/05/2023 139    • Potassium 06/05/2023 3 8    • Chloride 06/05/2023 103    • CO2 06/05/2023 27    • ANION GAP 06/05/2023 9    • BUN 06/05/2023 15    • Creatinine 06/05/2023 0 64    • Glucose 06/05/2023 99    • Calcium 06/05/2023 9 2    • AST 06/05/2023 19    • ALT 06/05/2023 24    • Alkaline Phosphatase 06/05/2023 86    • Total Protein 06/05/2023 6 4    • Albumin 06/05/2023 4 2    • Total Bilirubin 06/05/2023 1 02 (H)    • eGFR 06/05/2023 88    • Lipase 06/05/2023 22    • Extra Tube 06/05/2023 Hold for add-ons  • Extra Tube 06/05/2023 Hold for add-ons  • LACTIC ACID 06/05/2023 1 0    • Magnesium 06/05/2023 1 7 (L)    • HS TnI random 06/05/2023 4 (L)    • Blood Culture 06/05/2023 No Growth After 5 Days  • Blood Culture 06/05/2023 No Growth After 5 Days      • Color, UA 06/05/2023 Yellow    • Clarity, UA 06/05/2023 Clear    • Specific Gravity, UA 06/05/2023 <=1 005    • pH, UA 06/05/2023 6 0    • Leukocytes, UA 06/05/2023 Negative    • Nitrite, UA 06/05/2023 Negative    • Protein, UA 06/05/2023 Negative    • Glucose, UA 06/05/2023 Negative    • Ketones, UA 06/05/2023 Negative    • Urobilinogen, UA 06/05/2023 0 2    • Bilirubin, UA 06/05/2023 Negative    • Occult Blood, UA 06/05/2023 Negative          Radiology Results:   CT abdomen pelvis with contrast    Result Date: 6/5/2023  Narrative: CT ABDOMEN AND PELVIS WITH IV CONTRAST INDICATION:   Epigastric pain epigastric abdominal pain bloody diarrhea  COMPARISON: CT abdomen/pelvis 5/12/2022  TECHNIQUE:  CT examination of the abdomen and pelvis was performed  Multiplanar 2D reformatted images were created from the source data  This examination, like all CT scans performed in the [facility], was performed utilizing techniques to minimize radiation dose exposure, including the use of iterative reconstruction and automated exposure control  Radiation dose length product (DLP) for this visit:  879 87 mGy-cm IV Contrast:  100 mL of iohexol (OMNIPAQUE) Enteric Contrast:  Enteric contrast was not administered  FINDINGS: ABDOMEN LOWER CHEST:  No clinically significant abnormality identified in the visualized lower chest  LIVER/BILIARY TREE:  One or more simple cysts and subcentimeter sharply circumscribed low-density hepatic lesion(s) are noted, too small to accurately characterize, but statistically most likely to represent subcentimeter hepatic cysts  No suspicious solid hepatic lesion is identified  Hepatic contours are normal   No biliary dilatation  GALLBLADDER:  No calcified gallstones  No pericholecystic inflammatory change  SPLEEN:  Unremarkable  PANCREAS:  Unremarkable  ADRENAL GLANDS:  Unremarkable  KIDNEYS/URETERS:  Unremarkable  No hydronephrosis   STOMACH AND BOWEL: Under distended descending and sigmoid colon without definite wall thickening or surrounding fat stranding  Otherwise, within normal limits  APPENDIX:  No findings to suggest appendicitis  ABDOMINOPELVIC CAVITY:  No ascites  No pneumoperitoneum  No lymphadenopathy  VESSELS: Atherosclerotic changes are present  No evidence of aneurysm  PELVIS REPRODUCTIVE ORGANS: Surgical changes of prior hysterectomy  URINARY BLADDER:  Unremarkable  ABDOMINAL WALL/INGUINAL REGIONS:  Unremarkable  OSSEOUS STRUCTURES: No acute fracture or destructive osseous lesion  Spinal degenerative changes are noted  Moderate bilateral hip joint osteoarthritis  Impression: No acute abnormality in the abdomen or pelvis   Workstation performed: [id number]   Answers for HPI/ROS submitted by the patient on 6/20/2023  Chronicity: recurrent  Onset: more than 1 year ago  Onset quality: sudden  Frequency: intermittently  Episode duration: 1 Hours  Progression since onset: gradually worsening  Pain location: epigastric region  Pain - numeric: 8/10  Pain quality: sharp  Radiates to: epigastric region  anorexia: No  arthralgias: No  belching: No  constipation: No  diarrhea: Yes  dysuria: No  fever: No  flatus: Yes  frequency: No  headaches: No  hematochezia: Yes  hematuria: No  melena: No  myalgias: No  nausea: Yes  weight loss: No  vomiting: Yes  Aggravated by: nothing  Relieved by: bowel movements  Diagnostic workup: CT scan

## 2023-06-23 NOTE — H&P (VIEW-ONLY)
[facility] - Outpatient Consultation  Kelsy Youngblood 67 y.o. female MRN: [id number]  Encounter: [id number]          ASSESSMENT AND PLAN:    The patient presents today for an initial consultation for her epigastric pain that is intermittent and she reports over the course of her life she has dealt with some abdominal pain and GI distress intermittently, but over the past 2 months it has definitely been worse and changing reports that her pain is always between the bottom of her rib cage and her navel. She reports multiple instances of painful diarrhea, with quick onset and typically quick relief when she has evacuated her bowels. She reports that if she uses Pepto-Bismol she finds that it causes constipation. She also reports at least 2 or more instances of rectal bleeding, especially with constipation and straining which she feels is most likely her hemorrhoids. She also reports a few instances of nausea and vomiting. The patient reports that the pain became so severe that on June 5, 2023 she did to the ER for evaluation and a CT the abdomen and pelvis with contrast was performed without any etiology that would explain her pain or symptoms. GI Exam:  Oral mucosa normal upon visual inspection, without any sores, lesions, or ulcerations. Sclera without icterus and benign. Lung sounds CTA b/l. Normal S1 & S2 upon exam. Abdomen is soft and mild to moderately tender in the upper quadrants and epigastric areas. No edema noted of the b/l lower extremities noted upon exam today. Skin is non-icteric. 1. Epigastric pain  2.  Vomiting and diarrhea  While the patient was in the office today, I did discuss with the patient that at this point time with the changes in her bowel habits, nausea, vomiting and continued epigastric pain that has worsened over the past 2 months, I feel it is in her best interest to proceed with an EGD to further evaluate any other underlying etiology that could explain her pain, which could include a hiatal hernia, H. Pylori, or GERD. The patient was agreeable and verbalized an understanding. I discussed the risks of procedure with the patient including, but not limited to: bleeding, infection, sore throat, and perforation. The patient gave verbal understanding and is agreeable to proceed. In the meantime I am asked going to increase the omeprazole to 40 mg a day to try to ease her epigastric pain and until we know the exact cause of underlying etiology. - Ambulatory Referral to Gastroenterology  - omeprazole (PriLOSEC) 40 MG capsule; Take 1 PO QD 30 mins prior to a meal.  Dispense: 90 capsule; Refill: 1  - EGD; Future    3. History of rectal bleeding  4. Personal history of colonic polyps  I discussed with the patient that with her recent instances of rectal bleeding and changes in her bowel habits, with worsening symptoms and pain, I feel it is appropriate to proceed with a colonoscopy to evaluate for any underlying cause of her pain. The patient was agreeable and verbalized an understanding. I discussed the risks of procedure with the patient including, but not limited to: bleeding, infection, perforation, and spleen injury. The patient gave verbal understanding and is agreeable to proceed. Bowel prep instructions were reviewed and given as ordered. - Colonoscopy; Future  - polyethylene glycol-electrolytes (TriLyte) 4000 mL solution; Take 4,000 mL by mouth once for 1 dose Take 4000 mL by mouth once for 1 dose. Use as directed  Dispense: 4000 mL; Refill: 0    The patient will schedule a follow up office visit after her procedures and is to call our office if she has any worsening symptoms or issues.  The patient was agreeable and verbalized an understanding.     ______________________________________________________________________    HPI: The patient is a 67 y.o. female who presents today for a consultation regarding her epigastric pain that is intermittent and she reports over the course of her life she has dealt with some abdominal pain and GI distress intermittently, but over the past 2 months it has definitely been worse and changing reports that her pain is always between the bottom of her rib cage and her navel. She reports multiple instances of painful diarrhea, with quick onset and typically quick relief when she has evacuated her bowels. She reports that if she uses Pepto-Bismol she finds that it causes constipation. She also reports at least 2 or more instances of rectal bleeding, especially with constipation and straining which she feels is most likely her hemorrhoids. She also reports a few instances of nausea and vomiting. The patient reports that the pain became so severe that on June 5, 2023 she did to the ER for evaluation and a CT the abdomen and pelvis with contrast was performed without any etiology that would explain her pain or symptoms. The patient denies any reflux, chest pain, nausea, dysphagia, shortness of breath, early satiety, decreased appetite, or unplanned weight loss. Water Intake: 6-7 glasses/day. The patient reports that they have consistent, relieving, and regular bowel movements on a daily basis without any bloating, consistent diarrhea, nocturnal BMs, or melena. [place]: 4. Last BM: Today. Flatus: Yes, normal.     The patient denies any evidence of jaundice, fevers, gracie colored stools, swelling of the lower extremities, fatigue, confusion, memory loss or easy bruising.     PMH/PSH:    Abdominal/Chest Surgery: 2 C-Sections 76/79  Colon Cancer: Denied  Any Cancer: Uterine Cancer  Pre-Cancerous Polyps: Polyps  Crohn's: Denie  Ulcerative Colitis: Denied      Tobacco/Vaping: Denied  ETOH: Occasional, 1 glass of wine  Marijuana: Denied  Illicit Drug Use: Denied    FH:  Colon Cancer: Denied  Any Cancer: Daughter, Breast Ca  Family Members with Pre-Cancerous Polyps: Denied  Crohn's: Denied  Ulcerative Colitis: Denied    Serology: (23): Total bilirubin 1.02, otherwise the rest of the blood work was WNL. Meds: Omeprazole 20 mg daily. NSAID Use: Denied    Imaging: (23): No acute abnormality in the abdomen or pelvis. Endoscopy History: EGD: (None)     COLONOSCOPY: (21): Single small polyp. Recommend repeat colonoscopy in 5 years due to a personal history of colon polyps. Patho: benign. DUE: 26    REVIEW OF SYSTEMS:    CONSTITUTIONAL: Denies any fever, chills, rigors, and weight loss. HEENT: No earache or tinnitus. Denies hearing loss or visual disturbances. CARDIOVASCULAR: No chest pain or palpitations. RESPIRATORY: Denies any cough, hemoptysis, shortness of breath or dyspnea on exertion. GASTROINTESTINAL: As noted in the History of Present Illness. GENITOURINARY: No problems with urination. Denies any hematuria or dysuria. NEUROLOGIC: No dizziness or vertigo, denies headaches. MUSCULOSKELETAL: Denies any muscle or joint pain. SKIN: Denies skin rashes or itching. ENDOCRINE: Denies excessive thirst. Denies intolerance to heat or cold. PSYCHOSOCIAL: Denies depression or anxiety. Denies any recent memory loss.        Historical Information   Past Medical History:   Diagnosis Date   • Achilles tendon tear     bilateral   • Allergic rhinitis due to pollen    • Anxiety    • Depression    • HPV (human papilloma virus) infection    • Hyperlipidemia    • Kidney stones     renal calculi   • Mass of neck    • Osteoporosis    • Uterine cancer ([address])    • UTI (urinary tract infection)      Past Surgical History:   Procedure Laterality Date   •  SECTION  1898,4982   • COLONOSCOPY     • DXA PROCEDURE (HISTORICAL)     • HYSTERECTOMY Bilateral 2014    salpino-ooph   • OOPHORECTOMY Bilateral    • TENDON REPAIR  ,    Archilles tendon repair-bilateral     Social History   Social History     Substance and Sexual Activity   Alcohol Use Yes   • Alcohol/week: 2.0 standard drinks of alcohol   • Types: 1 Glasses of wine, 1 Cans of beer per week    Comment: social     Social History     Substance and Sexual Activity   Drug Use Never     Social History     Tobacco Use   Smoking Status Never   Smokeless Tobacco Never     Family History   Adopted: Yes   Problem Relation Age of Onset   • Breast cancer Daughter 39   • Parkinsonism Other    • Seizures Other        Meds/Allergies       Current Outpatient Medications:   •  atorvastatin (LIPITOR) 10 mg tablet  •  brimonidine (ALPHAGAN P) 0.15 % ophthalmic solution  •  Calcium Citrate-Vitamin D (CALCIUM CITRATE + D PO)  •  carboxymethylcellulose 0.5 % SOLN  •  cycloSPORINE (RESTASIS) 0.05 % ophthalmic emulsion  •  glucosamine-chondroitin 500-400 MG tablet  •  mometasone (NASONEX) 50 mcg/act nasal spray  •  multivitamin (THERAGRAN) TABS  •  Omega-3 Fatty Acids (FISH OIL PO)  •  omeprazole (PriLOSEC) 40 MG capsule  •  ondansetron (ZOFRAN-ODT) 4 mg disintegrating tablet  •  polyethylene glycol-electrolytes (TriLyte) 4000 mL solution  •  SM VITAMIN D3 4000 units CAPS    Allergies   Allergen Reactions   • Cefaclor Other (See Comments)     swelling and itching of hands and feet   • Cephalosporins Itching   • Prednisone Other (See Comments)     disconnected feeling           Objective     Blood pressure 127/56, pulse 75, temperature (!) 96.5 °F (35.8 °C), temperature source Tympanic, resp. rate 16, height 5' 2.5" (1.588 m), weight 70.8 kg (156 lb), SpO2 97 %. Body mass index is 28.08 kg/m². PHYSICAL EXAM:      General Appearance:   Alert, cooperative, no distress   HEENT:   Normocephalic, atraumatic, anicteric. Neck:  Supple, symmetrical, trachea midline   Lungs:   Clear to auscultation bilaterally; no rales, rhonchi or wheezing; respirations unlabored    Heart[de-identified]   Regular rate and rhythm; no murmur, rub, or gallop.    Abdomen:   Soft, non-tender, non-distended; normal bowel sounds; no masses, no organomegaly    Genitalia:   Deferred Rectal:   Deferred    Extremities:  No cyanosis, clubbing or edema    Pulses:  2+ and symmetric    Skin:  No jaundice, rashes, or lesions    Lymph nodes:  No palpable cervical lymphadenopathy        Lab Results:   No visits with results within 1 Day(s) from this visit. Latest known visit with results is:   Admission on 06/05/2023, Discharged on 06/05/2023   Component Date Value   • WBC 06/05/2023 8.03    • RBC 06/05/2023 5.10    • Hemoglobin 06/05/2023 14.7    • Hematocrit 06/05/2023 43.9    • MCV 06/05/2023 86    • MCH 06/05/2023 28.8    • MCHC 06/05/2023 33.5    • RDW 06/05/2023 13.0    • MPV 06/05/2023 8.9    • Platelets 78/28/5749 281    • nRBC 06/05/2023 0    • Neutrophils Relative 06/05/2023 72    • Immat GRANS % 06/05/2023 0    • Lymphocytes Relative 06/05/2023 20    • Monocytes Relative 06/05/2023 7    • Eosinophils Relative 06/05/2023 1    • Basophils Relative 06/05/2023 0    • Neutrophils Absolute 06/05/2023 5.78    • Immature Grans Absolute 06/05/2023 0.01    • Lymphocytes Absolute 06/05/2023 1.58    • Monocytes Absolute 06/05/2023 0.59    • Eosinophils Absolute 06/05/2023 0.04    • Basophils Absolute 06/05/2023 0.03    • Sodium 06/05/2023 139    • Potassium 06/05/2023 3.8    • Chloride 06/05/2023 103    • CO2 06/05/2023 27    • ANION GAP 06/05/2023 9    • BUN 06/05/2023 15    • Creatinine 06/05/2023 0.64    • Glucose 06/05/2023 99    • Calcium 06/05/2023 9.2    • AST 06/05/2023 19    • ALT 06/05/2023 24    • Alkaline Phosphatase 06/05/2023 86    • Total Protein 06/05/2023 6.4    • Albumin 06/05/2023 4.2    • Total Bilirubin 06/05/2023 1.02 (H)    • eGFR 06/05/2023 88    • Lipase 06/05/2023 22    • Extra Tube 06/05/2023 Hold for add-ons. • Extra Tube 06/05/2023 Hold for add-ons. • LACTIC ACID 06/05/2023 1.0    • Magnesium 06/05/2023 1.7 (L)    • HS TnI random 06/05/2023 4 (L)    • Blood Culture 06/05/2023 No Growth After 5 Days. • Blood Culture 06/05/2023 No Growth After 5 Days.     • Color, UA 06/05/2023 Yellow    • Clarity, UA 06/05/2023 Clear    • Specific Gravity, UA 06/05/2023 <=1.005    • pH, UA 06/05/2023 6.0    • Leukocytes, UA 06/05/2023 Negative    • Nitrite, UA 06/05/2023 Negative    • Protein, UA 06/05/2023 Negative    • Glucose, UA 06/05/2023 Negative    • Ketones, UA 06/05/2023 Negative    • Urobilinogen, UA 06/05/2023 0.2    • Bilirubin, UA 06/05/2023 Negative    • Occult Blood, UA 06/05/2023 Negative          Radiology Results:   CT abdomen pelvis with contrast    Result Date: 6/5/2023  Narrative: CT ABDOMEN AND PELVIS WITH IV CONTRAST INDICATION:   Epigastric pain epigastric abdominal pain bloody diarrhea. COMPARISON: CT abdomen/pelvis 5/12/2022. TECHNIQUE:  CT examination of the abdomen and pelvis was performed. Multiplanar 2D reformatted images were created from the source data. This examination, like all CT scans performed in the [facility], was performed utilizing techniques to minimize radiation dose exposure, including the use of iterative reconstruction and automated exposure control. Radiation dose length product (DLP) for this visit:  879.87 mGy-cm IV Contrast:  100 mL of iohexol (OMNIPAQUE) Enteric Contrast:  Enteric contrast was not administered. FINDINGS: ABDOMEN LOWER CHEST:  No clinically significant abnormality identified in the visualized lower chest. LIVER/BILIARY TREE:  One or more simple cysts and subcentimeter sharply circumscribed low-density hepatic lesion(s) are noted, too small to accurately characterize, but statistically most likely to represent subcentimeter hepatic cysts. No suspicious solid hepatic lesion is identified. Hepatic contours are normal.  No biliary dilatation. GALLBLADDER:  No calcified gallstones. No pericholecystic inflammatory change. SPLEEN:  Unremarkable. PANCREAS:  Unremarkable. ADRENAL GLANDS:  Unremarkable. KIDNEYS/URETERS:  Unremarkable. No hydronephrosis.  STOMACH AND BOWEL: Under distended descending and sigmoid colon without definite wall thickening or surrounding fat stranding. Otherwise, within normal limits. APPENDIX:  No findings to suggest appendicitis. ABDOMINOPELVIC CAVITY:  No ascites. No pneumoperitoneum. No lymphadenopathy. VESSELS: Atherosclerotic changes are present. No evidence of aneurysm. PELVIS REPRODUCTIVE ORGANS: Surgical changes of prior hysterectomy. URINARY BLADDER:  Unremarkable. ABDOMINAL WALL/INGUINAL REGIONS:  Unremarkable. OSSEOUS STRUCTURES: No acute fracture or destructive osseous lesion. Spinal degenerative changes are noted. Moderate bilateral hip joint osteoarthritis. Impression: No acute abnormality in the abdomen or pelvis.  Workstation performed: [id number]   Answers for HPI/ROS submitted by the patient on 6/20/2023  Chronicity: recurrent  Onset: more than 1 year ago  Onset quality: sudden  Frequency: intermittently  Episode duration: 1 Hours  Progression since onset: gradually worsening  Pain location: epigastric region  Pain - numeric: 8/10  Pain quality: sharp  Radiates to: epigastric region  anorexia: No  arthralgias: No  belching: No  constipation: No  diarrhea: Yes  dysuria: No  fever: No  flatus: Yes  frequency: No  headaches: No  hematochezia: Yes  hematuria: No  melena: No  myalgias: No  nausea: Yes  weight loss: No  vomiting: Yes  Aggravated by: nothing  Relieved by: bowel movements  Diagnostic workup: CT scan

## 2023-06-25 PROBLEM — Z86.0100 PERSONAL HISTORY OF COLONIC POLYPS: Status: ACTIVE | Noted: 2023-06-25

## 2023-06-25 PROBLEM — Z86.010 PERSONAL HISTORY OF COLONIC POLYPS: Status: ACTIVE | Noted: 2023-06-25

## 2023-06-26 ENCOUNTER — TELEPHONE (OUTPATIENT)
Dept: GASTROENTEROLOGY | Facility: CLINIC | Age: 73
End: 2023-06-26

## 2023-06-27 ENCOUNTER — TELEPHONE (OUTPATIENT)
Age: 73
End: 2023-06-27

## 2023-07-14 ENCOUNTER — HOSPITAL ENCOUNTER (OUTPATIENT)
Dept: PERIOP | Facility: HOSPITAL | Age: 73
Setting detail: OUTPATIENT SURGERY
End: 2023-07-14
Payer: MEDICARE

## 2023-07-14 ENCOUNTER — ANESTHESIA EVENT (OUTPATIENT)
Dept: PERIOP | Facility: HOSPITAL | Age: 73
End: 2023-07-14

## 2023-07-14 ENCOUNTER — ANESTHESIA (OUTPATIENT)
Dept: PERIOP | Facility: HOSPITAL | Age: 73
End: 2023-07-14

## 2023-07-14 VITALS
HEIGHT: 63 IN | DIASTOLIC BLOOD PRESSURE: 72 MMHG | RESPIRATION RATE: 20 BRPM | TEMPERATURE: 97.4 F | OXYGEN SATURATION: 97 % | HEART RATE: 80 BPM | SYSTOLIC BLOOD PRESSURE: 139 MMHG | WEIGHT: 156 LBS | BODY MASS INDEX: 27.64 KG/M2

## 2023-07-14 DIAGNOSIS — Z86.010 PERSONAL HISTORY OF COLONIC POLYPS: ICD-10-CM

## 2023-07-14 DIAGNOSIS — R19.7 VOMITING AND DIARRHEA: ICD-10-CM

## 2023-07-14 DIAGNOSIS — R11.10 VOMITING AND DIARRHEA: ICD-10-CM

## 2023-07-14 DIAGNOSIS — Z87.19 HISTORY OF RECTAL BLEEDING: ICD-10-CM

## 2023-07-14 DIAGNOSIS — R10.13 EPIGASTRIC PAIN: ICD-10-CM

## 2023-07-14 PROCEDURE — 45385 COLONOSCOPY W/LESION REMOVAL: CPT | Performed by: INTERNAL MEDICINE

## 2023-07-14 PROCEDURE — 43239 EGD BIOPSY SINGLE/MULTIPLE: CPT | Performed by: INTERNAL MEDICINE

## 2023-07-14 PROCEDURE — 43251 EGD REMOVE LESION SNARE: CPT | Performed by: INTERNAL MEDICINE

## 2023-07-14 PROCEDURE — 88305 TISSUE EXAM BY PATHOLOGIST: CPT | Performed by: SPECIALIST

## 2023-07-14 RX ORDER — METOCLOPRAMIDE HYDROCHLORIDE 5 MG/ML
10 INJECTION INTRAMUSCULAR; INTRAVENOUS ONCE AS NEEDED
Status: DISCONTINUED | OUTPATIENT
Start: 2023-07-14 | End: 2023-07-18 | Stop reason: HOSPADM

## 2023-07-14 RX ORDER — LIDOCAINE HYDROCHLORIDE 10 MG/ML
0.5 INJECTION, SOLUTION EPIDURAL; INFILTRATION; INTRACAUDAL; PERINEURAL ONCE AS NEEDED
Status: DISCONTINUED | OUTPATIENT
Start: 2023-07-14 | End: 2023-07-18 | Stop reason: HOSPADM

## 2023-07-14 RX ORDER — ONDANSETRON 2 MG/ML
4 INJECTION INTRAMUSCULAR; INTRAVENOUS ONCE AS NEEDED
Status: DISCONTINUED | OUTPATIENT
Start: 2023-07-14 | End: 2023-07-18 | Stop reason: HOSPADM

## 2023-07-14 RX ORDER — PROPOFOL 10 MG/ML
INJECTION, EMULSION INTRAVENOUS AS NEEDED
Status: DISCONTINUED | OUTPATIENT
Start: 2023-07-14 | End: 2023-07-14

## 2023-07-14 RX ORDER — SODIUM CHLORIDE, SODIUM LACTATE, POTASSIUM CHLORIDE, CALCIUM CHLORIDE 600; 310; 30; 20 MG/100ML; MG/100ML; MG/100ML; MG/100ML
125 INJECTION, SOLUTION INTRAVENOUS CONTINUOUS
Status: DISCONTINUED | OUTPATIENT
Start: 2023-07-14 | End: 2023-07-18 | Stop reason: HOSPADM

## 2023-07-14 RX ORDER — DIPHENHYDRAMINE HYDROCHLORIDE 50 MG/ML
12.5 INJECTION INTRAMUSCULAR; INTRAVENOUS ONCE AS NEEDED
Status: DISCONTINUED | OUTPATIENT
Start: 2023-07-14 | End: 2023-07-18 | Stop reason: HOSPADM

## 2023-07-14 RX ORDER — PROPOFOL 10 MG/ML
INJECTION, EMULSION INTRAVENOUS CONTINUOUS PRN
Status: DISCONTINUED | OUTPATIENT
Start: 2023-07-14 | End: 2023-07-14

## 2023-07-14 RX ORDER — LIDOCAINE HYDROCHLORIDE 20 MG/ML
INJECTION, SOLUTION EPIDURAL; INFILTRATION; INTRACAUDAL; PERINEURAL AS NEEDED
Status: DISCONTINUED | OUTPATIENT
Start: 2023-07-14 | End: 2023-07-14

## 2023-07-14 RX ADMIN — PROPOFOL 140 MCG/KG/MIN: 10 INJECTION, EMULSION INTRAVENOUS at 09:51

## 2023-07-14 RX ADMIN — SODIUM CHLORIDE, SODIUM LACTATE, POTASSIUM CHLORIDE, AND CALCIUM CHLORIDE: .6; .31; .03; .02 INJECTION, SOLUTION INTRAVENOUS at 09:30

## 2023-07-14 RX ADMIN — PROPOFOL 50 MG: 10 INJECTION, EMULSION INTRAVENOUS at 09:47

## 2023-07-14 RX ADMIN — PROPOFOL 100 MG: 10 INJECTION, EMULSION INTRAVENOUS at 09:45

## 2023-07-14 RX ADMIN — LIDOCAINE HYDROCHLORIDE 100 MG: 20 INJECTION, SOLUTION EPIDURAL; INFILTRATION; INTRACAUDAL; PERINEURAL at 09:45

## 2023-07-14 RX ADMIN — PROPOFOL 50 MG: 10 INJECTION, EMULSION INTRAVENOUS at 09:49

## 2023-07-14 NOTE — ANESTHESIA PREPROCEDURE EVALUATION
Procedure:  COLONOSCOPY  EGD    Relevant Problems   ANESTHESIA (within normal limits)      CARDIO   (+) Hyperlipidemia      ENDO (within normal limits)      GI/HEPATIC (within normal limits)      /RENAL   (+) Nephrolithiasis      GYN   (+) Endometrial cancer (HCC)      HEMATOLOGY (within normal limits)      MUSCULOSKELETAL (within normal limits)      NEURO/PSYCH (within normal limits)      PULMONARY (within normal limits)      Other   (+) Epigastric pain   (+) History of rectal bleeding   (+) Personal history of colonic polyps        Physical Exam    Airway    Mallampati score: II  TM Distance: >3 FB  Neck ROM: full     Dental   No notable dental hx     Cardiovascular  Rhythm: regular, Rate: normal, Cardiovascular exam normal    Pulmonary  Pulmonary exam normal Breath sounds clear to auscultation,     Other Findings        Anesthesia Plan  ASA Score- 3     Anesthesia Type- IV sedation with anesthesia with ASA Monitors. Additional Monitors:   Airway Plan:           Plan Factors-Exercise tolerance (METS): >4 METS. Chart reviewed. EKG reviewed. Imaging results reviewed. Existing labs reviewed. Patient summary reviewed. Induction- intravenous. Postoperative Plan-     Informed Consent- Anesthetic plan and risks discussed with patient. I personally reviewed this patient with the CRNA. Discussed and agreed on the Anesthesia Plan with the CRNA. Sveta Guzman

## 2023-07-14 NOTE — ANESTHESIA POSTPROCEDURE EVALUATION
Post-Op Assessment Note    CV Status:  Stable    Pain management: adequate     Mental Status:  Alert and awake   Hydration Status:  Euvolemic   PONV Controlled:  Controlled   Airway Patency:  Patent      Post Op Vitals Reviewed: Yes      Staff: CRNA         No notable events documented.     BP   120/56   Temp      Pulse  78   Resp   18   SpO2   98%

## 2023-07-14 NOTE — INTERVAL H&P NOTE
H&P reviewed. After examining the patient I find no changes in the patients condition since the H&P had been written.     Vitals:    07/14/23 0827   BP: 145/68   Pulse: 78   Resp: 20   Temp: 97.8 °F (36.6 °C)   SpO2: 97%

## 2023-07-18 PROCEDURE — 88305 TISSUE EXAM BY PATHOLOGIST: CPT | Performed by: SPECIALIST

## 2023-07-25 NOTE — RESULT ENCOUNTER NOTE
I called her with her results. Ascending colon polyp: Bleeding was not identified in the specimen cup. It was most likely an adenoma so I will continue plan for repeat colonoscopy in 7 years. Biopsies were unremarkable.

## 2023-08-28 ENCOUNTER — RA CDI HCC (OUTPATIENT)
Dept: OTHER | Facility: HOSPITAL | Age: 73
End: 2023-08-28

## 2023-08-28 NOTE — PROGRESS NOTES
[address] coding opportunities       Chart reviewed, no opportunity found: CHART REVIEWED, NO OPPORTUNITY FOUND        Patients Insurance     Medicare Insurance: Medicare

## 2023-08-30 ENCOUNTER — OFFICE VISIT (OUTPATIENT)
Dept: FAMILY MEDICINE CLINIC | Facility: CLINIC | Age: 73
End: 2023-08-30
Payer: MEDICARE

## 2023-08-30 VITALS
WEIGHT: 155 LBS | BODY MASS INDEX: 27.46 KG/M2 | DIASTOLIC BLOOD PRESSURE: 70 MMHG | TEMPERATURE: 97.6 F | OXYGEN SATURATION: 95 % | SYSTOLIC BLOOD PRESSURE: 144 MMHG | HEIGHT: 63 IN | HEART RATE: 90 BPM

## 2023-08-30 DIAGNOSIS — R10.13 EPIGASTRIC PAIN: ICD-10-CM

## 2023-08-30 DIAGNOSIS — E55.9 VITAMIN D DEFICIENCY: ICD-10-CM

## 2023-08-30 DIAGNOSIS — Z00.00 MEDICARE ANNUAL WELLNESS VISIT, SUBSEQUENT: Primary | ICD-10-CM

## 2023-08-30 DIAGNOSIS — Z23 NEED FOR PNEUMOCOCCAL VACCINE: ICD-10-CM

## 2023-08-30 DIAGNOSIS — E78.5 HYPERLIPIDEMIA, UNSPECIFIED HYPERLIPIDEMIA TYPE: ICD-10-CM

## 2023-08-30 DIAGNOSIS — N20.0 NEPHROLITHIASIS: ICD-10-CM

## 2023-08-30 PROBLEM — R19.7 VOMITING AND DIARRHEA: Status: RESOLVED | Noted: 2023-06-23 | Resolved: 2023-08-30

## 2023-08-30 PROBLEM — R11.10 VOMITING AND DIARRHEA: Status: RESOLVED | Noted: 2023-06-23 | Resolved: 2023-08-30

## 2023-08-30 PROBLEM — Z87.19 HISTORY OF RECTAL BLEEDING: Status: RESOLVED | Noted: 2023-06-23 | Resolved: 2023-08-30

## 2023-08-30 PROBLEM — C54.1 ENDOMETRIAL CANCER (HCC): Status: RESOLVED | Noted: 2017-03-22 | Resolved: 2023-08-30

## 2023-08-30 PROCEDURE — G0439 PPPS, SUBSEQ VISIT: HCPCS | Performed by: PHYSICIAN ASSISTANT

## 2023-08-30 PROCEDURE — G0009 ADMIN PNEUMOCOCCAL VACCINE: HCPCS | Performed by: PHYSICIAN ASSISTANT

## 2023-08-30 PROCEDURE — 99213 OFFICE O/P EST LOW 20 MIN: CPT | Performed by: PHYSICIAN ASSISTANT

## 2023-08-30 PROCEDURE — 90677 PCV20 VACCINE IM: CPT | Performed by: PHYSICIAN ASSISTANT

## 2023-08-30 NOTE — PROGRESS NOTES
Assessment and Plan:     Problem List Items Addressed This Visit        Genitourinary    Nephrolithiasis     Patient follows yearly with urology  No recent renal colic            Other    Hyperlipidemia     Labs ordered to evaluate  Continue atorvastatin 10 mg daily  Recommended low-fat diet         Epigastric pain     Intermittent. Stable on omeprazole. Patient is scheduled to follow-up with gastroenterology. Vitamin D deficiency     Labs ordered to evaluate. Continue vitamin D supplementation. Relevant Orders    Vitamin D 25 hydroxy    Medicare annual wellness visit, subsequent - Primary     Routine labs ordered. Patient is up-to-date with eye exams and dental cleanings. Mammograms up-to-date  DEXA scan up-to-date, next due 2024  Colon cancer screenings up-to-date  Pneumococcal 20 given today  Discussed influenza vaccine, COVID-vaccine, and RSV vaccine         Relevant Orders    Lipid panel    Comprehensive metabolic panel    Vitamin D 25 hydroxy    CBC and differential   Other Visit Diagnoses     Need for pneumococcal vaccine        Relevant Orders    Pneumococcal Conjugate Vaccine 20-valent (Pcv20) (Completed)           Preventive health issues were discussed with patient, and age appropriate screening tests were ordered as noted in patient's After Visit Summary. Personalized health advice and appropriate referrals for health education or preventive services given if needed, as noted in patient's After Visit Summary. History of Present Illness:     Patient presents for a Medicare Wellness Visit    Anthony Mcclelland is a very pleasant 69-year-old female who is here today for a Medicare annual well visit. She admits that she is doing well. She follows regularly with her urologist for history of kidney stones. She has not had any recent renal colic. She was seen in the emergency room for epigastric pain. She did have a follow-up with gastroenterology. She had an EGD and colonoscopy.   She was started on omeprazole. She admits that the pain has been well controlled, and she is scheduled to follow-up with gastroenterology next month. She is due for her routine blood work. She is asking if she is up-to-date with vaccines. She does plan on getting the flu shot and COVID booster. She follows yearly with the dermatologist for her regular skin checks. She is up-to-date with eye exams and dental cleanings. She needs to schedule her annual GYN visit. Patient Care Team:  Tushar Loera PA-C as PCP - General (Family Medicine)     Review of Systems:     Review of Systems   Constitutional: Negative for chills, diaphoresis, fatigue and fever. HENT: Negative for congestion, ear pain, postnasal drip, rhinorrhea, sneezing, sore throat and trouble swallowing. Eyes: Negative for pain and visual disturbance. Respiratory: Negative for apnea, cough, shortness of breath and wheezing. Cardiovascular: Negative for chest pain and palpitations. Gastrointestinal: Negative for abdominal pain, constipation, diarrhea, nausea and vomiting. Genitourinary: Negative for dysuria. Musculoskeletal: Negative for arthralgias, gait problem and myalgias. Neurological: Negative for dizziness, syncope, weakness, light-headedness, numbness and headaches. Psychiatric/Behavioral: Negative for suicidal ideas. The patient is not nervous/anxious.          Problem List:     Patient Active Problem List   Diagnosis   • Nephrolithiasis   • Hyperlipidemia   • Epigastric pain   • Personal history of colonic polyps   • Vitamin D deficiency   • Medicare annual wellness visit, subsequent      Past Medical and Surgical History:     Past Medical History:   Diagnosis Date   • Achilles tendon tear 2014,2018    bilateral   • Allergic rhinitis due to pollen    • Anxiety    • Colon polyp    • Depression    • Endometrial cancer ([address]) 3/22/2017   • History of rectal bleeding 6/23/2023   • HPV (human papilloma virus) infection    • Hyperlipidemia    • Kidney stones     renal calculi   • Mass of neck    • Osteoporosis    • Uterine cancer ([address])    • UTI (urinary tract infection)      Past Surgical History:   Procedure Laterality Date   •  SECTION  2379,7570   • COLONOSCOPY     • DXA PROCEDURE (HISTORICAL)     • HYSTERECTOMY Bilateral     salpino-ooph   • OOPHORECTOMY Bilateral    • TENDON REPAIR  ,    Archilles tendon repair-bilateral      Family History:     Family History   Adopted: Yes   Problem Relation Age of Onset   • Breast cancer Daughter 39   • Parkinsonism Other    • Seizures Other       Social History:     Social History     Socioeconomic History   • Marital status: /Civil Union     Spouse name: None   • Number of children: None   • Years of education: None   • Highest education level: None   Occupational History   • None   Tobacco Use   • Smoking status: Never   • Smokeless tobacco: Never   Vaping Use   • Vaping Use: Never used   Substance and Sexual Activity   • Alcohol use: Yes     Alcohol/week: 2.0 standard drinks of alcohol     Types: 1 Glasses of wine, 1 Cans of beer per week     Comment: social   • Drug use: Never   • Sexual activity: Not Currently     Partners: Male     Birth control/protection: Post-menopausal     Comment: hysterectomy   Other Topics Concern   • None   Social History Narrative   • None     Social Determinants of Health     Financial Resource Strain: Low Risk  (2023)    Overall Financial Resource Strain (CARDIA)    • Difficulty of Paying Living Expenses: Not hard at all   Food Insecurity: Not on file   Transportation Needs: No Transportation Needs (2023)    PRAPARE - Transportation    • Lack of Transportation (Medical): No    • Lack of Transportation (Non-Medical):  No   Physical Activity: Not on file   Stress: Not on file   Social Connections: Not on file   Intimate Partner Violence: Not on file   Housing Stability: Not on file      Medications and Allergies:     Current Outpatient Medications   Medication Sig Dispense Refill   • atorvastatin (LIPITOR) 10 mg tablet TAKE 1 TABLET BY MOUTH DAILY 90 tablet 0   • brimonidine (ALPHAGAN P) 0.15 % ophthalmic solution 1 drop 2 (two) times a day     • Calcium Citrate-Vitamin D (CALCIUM CITRATE + D PO) Take by mouth     • carboxymethylcellulose 0.5 % SOLN 1 drop 3 (three) times a day as needed for dry eyes     • cycloSPORINE (RESTASIS) 0.05 % ophthalmic emulsion Apply 1 drop to eye     • glucosamine-chondroitin 500-400 MG tablet Take 1 tablet by mouth     • mometasone (NASONEX) 50 mcg/act nasal spray SHAKE LQ AND U 2 SPRAYS IEN D  3   • multivitamin (THERAGRAN) TABS Take 1 tablet by mouth     • Omega-3 Fatty Acids (FISH OIL PO) Take 2 g by mouth     • omeprazole (PriLOSEC) 40 MG capsule Take 1 PO QD 30 mins prior to a meal. 90 capsule 1   • SM VITAMIN D3 4000 units CAPS Take 2,000 Units by mouth daily        No current facility-administered medications for this visit.      Allergies   Allergen Reactions   • Cefaclor Other (See Comments)     swelling and itching of hands and feet   • Cephalosporins Itching   • Prednisone Other (See Comments)     disconnected feeling      Immunizations:     Immunization History   Administered Date(s) Administered   • COVID-19 MODERNA VACC 0.25 ML IM BOOSTER 11/01/2021   • COVID-19 MODERNA VACC 0.5 ML IM 03/10/2021, 04/07/2021, 11/01/2021, 09/29/2022   • INFLUENZA 10/29/2007, 10/23/2008, 11/20/2015, 09/22/2016, 11/01/2017, 10/22/2018, 10/08/2021, 09/29/2022   • Influenza Split High Dose Preservative Free IM 10/07/2019   • Pneumococcal Conjugate Vaccine 20-valent (Pcv20), Polysace 08/30/2023   • Pneumococcal Polysaccharide PPV23 09/22/2016      Health Maintenance:         Topic Date Due   • Breast Cancer Screening: Mammogram  11/04/2023   • Colorectal Cancer Screening  07/12/2030   • Hepatitis C Screening  Completed         Topic Date Due   • COVID-19 Vaccine (6 - [place] series) 11/24/2022 • Influenza Vaccine (1) 09/01/2023      Medicare Screening Tests and Risk Assessments:     Diane Maynard is here for her Subsequent Wellness visit. Health Risk Assessment:   Patient rates overall health as very good. Patient feels that their physical health rating is same. Patient is very satisfied with their life. Eyesight was rated as same. Hearing was rated as same. Patient feels that their emotional and mental health rating is same. Patients states they are never, rarely angry. Patient states they are never, rarely unusually tired/fatigued. Pain experienced in the last 7 days has been none. Patient states that she has experienced weight loss or gain in last 6 months. Fall Risk Screening: In the past year, patient has experienced: no history of falling in past year      Urinary Incontinence Screening:   Patient has leaked urine accidently in the last six months. Sometimes when I sneeze. Home Safety:  Patient does not have trouble with stairs inside or outside of their home. Patient has working smoke alarms and has no working carbon monoxide detector. Home safety hazards include: none. Nutrition:   Current diet is Regular and Limited junk food. Medications:   Patient is currently taking over-the-counter supplements. OTC medications include: Bitamins see list. Patient is able to manage medications. Activities of Daily Living (ADLs)/Instrumental Activities of Daily Living (IADLs):   Walk and transfer into and out of bed and chair?: Yes  Dress and groom yourself?: Yes    Bathe or shower yourself?: Yes    Feed yourself?  Yes  Do your laundry/housekeeping?: Yes  Manage your money, pay your bills and track your expenses?: Yes  Make your own meals?: Yes    Do your own shopping?: Yes    Previous Hospitalizations:   Any hospitalizations or ED visits within the last 12 months?: Yes    How many hospitalizations have you had in the last year?: 1-2    Advance Care Planning:   Living will: Yes    Durable POA for healthcare: Yes    Advanced directive: Yes      Cognitive Screening:   Provider or family/friend/caregiver concerned regarding cognition?: No    PREVENTIVE SCREENINGS      Cardiovascular Screening:    General: Screening Not Indicated, History Lipid Disorder and Risks and Benefits Discussed    Due for: Lipid Panel      Diabetes Screening:     General: Screening Current and Risks and Benefits Discussed    Due for: Blood Glucose      Colorectal Cancer Screening:     General: Screening Current      Breast Cancer Screening:     General: Screening Current and Risks and Benefits Discussed      Cervical Cancer Screening:    General: Screening Not Indicated and Risks and Benefits Discussed      Osteoporosis Screening:    General: Risks and Benefits Discussed      Abdominal Aortic Aneurysm (AAA) Screening:        General: Screening Not Indicated and Risks and Benefits Discussed      Lung Cancer Screening:     General: Screening Not Indicated and Risks and Benefits Discussed      Hepatitis C Screening:    General: Screening Current and Risks and Benefits Discussed    Screening, Brief Intervention, and Referral to Treatment (SBIRT)    Screening  Typical number of drinks in a day: 0  Typical number of drinks in a week: 0  Interpretation: Low risk drinking behavior. AUDIT-C Screenin) How often did you have a drink containing alcohol in the past year? monthly or less  2) How many drinks did you have on a typical day when you were drinking in the past year?  1 to 2  3) How often did you have 6 or more drinks on one occasion in the past year? never    AUDIT-C Score: 1  Interpretation: Score 0-2 (female): Negative screen for alcohol misuse    Single Item Drug Screening:  How often have you used an illegal drug (including marijuana) or a prescription medication for non-medical reasons in the past year? never    Single Item Drug Screen Score: 0  Interpretation: Negative screen for possible drug use disorder    Brief Intervention  Alcohol & drug use screenings were reviewed. No concerns regarding substance use disorder identified. Other Counseling Topics:   Car/seat belt/driving safety, skin self-exam, sunscreen and calcium and vitamin D intake and regular weightbearing exercise. No results found. Physical Exam:     /70 (BP Location: Left arm, Patient Position: Sitting, Cuff Size: Standard)   Pulse 90   Temp 97.6 °F (36.4 °C) (Temporal)   Ht 5' 2.5" (1.588 m)   Wt 70.3 kg (155 lb)   LMP  (LMP Unknown) Comment: hysterectomy 2014  SpO2 95%   BMI 27.90 kg/m²     Physical Exam  Vitals and nursing note reviewed. Constitutional:       General: She is not in acute distress. Appearance: She is well-developed. She is not diaphoretic. HENT:      Head: Normocephalic and atraumatic. Right Ear: Hearing, tympanic membrane, ear canal and external ear normal.      Left Ear: Hearing, tympanic membrane, ear canal and external ear normal.      Nose: Nose normal. No mucosal edema or rhinorrhea. Mouth/Throat:      Pharynx: No oropharyngeal exudate or posterior oropharyngeal erythema. Cardiovascular:      Rate and Rhythm: Normal rate and regular rhythm. Heart sounds: Normal heart sounds. No murmur heard. No friction rub. No gallop. Pulmonary:      Effort: Pulmonary effort is normal. No respiratory distress. Breath sounds: Normal breath sounds. No wheezing or rales. Abdominal:      General: Bowel sounds are normal. There is no distension. Palpations: Abdomen is soft. Tenderness: There is no abdominal tenderness. There is no guarding. Musculoskeletal:         General: Normal range of motion. Cervical back: Normal range of motion and neck supple. Lymphadenopathy:      Cervical: No cervical adenopathy. Skin:     General: Skin is warm and dry. Findings: No rash. Neurological:      Mental Status: She is alert and oriented to person, place, and time.    Psychiatric: Behavior: Behavior normal.         Thought Content:  Thought content normal.         Judgment: Judgment normal.          Tushar Loera PA-C

## 2023-08-30 NOTE — ASSESSMENT & PLAN NOTE
Routine labs ordered. Patient is up-to-date with eye exams and dental cleanings.   Mammograms up-to-date  DEXA scan up-to-date, next due 2024  Colon cancer screenings up-to-date  Pneumococcal 20 given today  Discussed influenza vaccine, COVID-vaccine, and RSV vaccine

## 2023-08-30 NOTE — PATIENT INSTRUCTIONS
Medicare Preventive Visit Patient Instructions  Thank you for completing your Welcome to Medicare Visit or Medicare Annual Wellness Visit today. Your next wellness visit will be due in one year (8/30/2024). The screening/preventive services that you may require over the next 5-10 years are detailed below. Some tests may not apply to you based off risk factors and/or age. Screening tests ordered at today's visit but not completed yet may show as past due. Also, please note that scanned in results may not display below. Preventive Screenings:  Service Recommendations Previous Testing/Comments   Colorectal Cancer Screening  * Colonoscopy    * Fecal Occult Blood Test (FOBT)/Fecal Immunochemical Test (FIT)  * Fecal DNA/Cologuard Test  * Flexible Sigmoidoscopy Age: 43-73 years old   Colonoscopy: every 10 years (may be performed more frequently if at higher risk)  OR  FOBT/FIT: every 1 year  OR  Cologuard: every 3 years  OR  Sigmoidoscopy: every 5 years  Screening may be recommended earlier than age 39 if at higher risk for colorectal cancer. Also, an individualized decision between you and your healthcare provider will decide whether screening between the ages of 77-80 would be appropriate. Colonoscopy: 07/14/2023  FOBT/FIT: Not on file  Cologuard: Not on file  Sigmoidoscopy: Not on file    Screening Current     Breast Cancer Screening Age: 36 years old  Frequency: every 1-2 years  Not required if history of left and right mastectomy Mammogram: 11/04/2022    Screening Current   Cervical Cancer Screening Between the ages of 21-29, pap smear recommended once every 3 years. Between the ages of 32-69, can perform pap smear with HPV co-testing every 5 years.    Recommendations may differ for women with a history of total hysterectomy, cervical cancer, or abnormal pap smears in past. Pap Smear: 10/18/2022    Screening Not Indicated   Hepatitis C Screening Once for adults born between 1945 and 1965  More frequently in patients at high risk for Hepatitis C Hep C Antibody: 08/31/2022    Screening Current   Diabetes Screening 1-2 times per year if you're at risk for diabetes or have pre-diabetes Fasting glucose: 92 mg/dL (8/31/2022)  A1C: No results in last 5 years (No results in last 5 years)  Screening Current   Cholesterol Screening Once every 5 years if you don't have a lipid disorder. May order more often based on risk factors. Lipid panel: 08/31/2022    Screening Not Indicated  History Lipid Disorder     Other Preventive Screenings Covered by Medicare:  1. Abdominal Aortic Aneurysm (AAA) Screening: covered once if your at risk. You're considered to be at risk if you have a family history of AAA. 2. Lung Cancer Screening: covers low dose CT scan once per year if you meet all of the following conditions: (1) Age 48-67; (2) No signs or symptoms of lung cancer; (3) Current smoker or have quit smoking within the last 15 years; (4) You have a tobacco smoking history of at least 20 pack years (packs per day multiplied by number of years you smoked); (5) You get a written order from a healthcare provider. 3. Glaucoma Screening: covered annually if you're considered high risk: (1) You have diabetes OR (2) Family history of glaucoma OR (3)  aged 48 and older OR (3)  American aged 72 and older  3. Osteoporosis Screening: covered every 2 years if you meet one of the following conditions: (1) You're estrogen deficient and at risk for osteoporosis based off medical history and other findings; (2) Have a vertebral abnormality; (3) On glucocorticoid therapy for more than 3 months; (4) Have primary hyperparathyroidism; (5) On osteoporosis medications and need to assess response to drug therapy. · Last bone density test (DXA Scan): 11/04/2022.  5. HIV Screening: covered annually if you're between the age of 15-65. Also covered annually if you are younger than 13 and older than 72 with risk factors for HIV infection. For pregnant patients, it is covered up to 3 times per pregnancy. Immunizations:  Immunization Recommendations   Influenza Vaccine Annual influenza vaccination during flu season is recommended for all persons aged >= 6 months who do not have contraindications   Pneumococcal Vaccine   * Pneumococcal conjugate vaccine = PCV13 (Prevnar 13), PCV15 (Vaxneuvance), PCV20 (Prevnar 20)  * Pneumococcal polysaccharide vaccine = PPSV23 (Pneumovax) Adults 20-63 years old: 1-3 doses may be recommended based on certain risk factors  Adults 72 years old: 1-2 doses may be recommended based off what pneumonia vaccine you previously received   Hepatitis B Vaccine 3 dose series if at intermediate or high risk (ex: diabetes, end stage renal disease, liver disease)   Tetanus (Td) Vaccine - COST NOT COVERED BY MEDICARE PART B Following completion of primary series, a booster dose should be given every 10 years to maintain immunity against tetanus. Td may also be given as tetanus wound prophylaxis. Tdap Vaccine - COST NOT COVERED BY MEDICARE PART B Recommended at least once for all adults. For pregnant patients, recommended with each pregnancy. Shingles Vaccine (Shingrix) - COST NOT COVERED BY MEDICARE PART B  2 shot series recommended in those aged 48 and above     Health Maintenance Due:      Topic Date Due   • Breast Cancer Screening: Mammogram  11/04/2023   • Colorectal Cancer Screening  07/12/2030   • Hepatitis C Screening  Completed     Immunizations Due:      Topic Date Due   • Pneumococcal Vaccine: 65+ Years (2 - PCV) 09/22/2017   • COVID-19 Vaccine (5 - Moderna series) 11/24/2022   • Influenza Vaccine (1) 09/01/2023     Advance Directives   What are advance directives? Advance directives are legal documents that state your wishes and plans for medical care. These plans are made ahead of time in case you lose your ability to make decisions for yourself.  Advance directives can apply to any medical decision, such as the treatments you want, and if you want to donate organs. What are the types of advance directives? There are many types of advance directives, and each state has rules about how to use them. You may choose a combination of any of the following:  · Living will: This is a written record of the treatment you want. You can also choose which treatments you do not want, which to limit, and which to stop at a certain time. This includes surgery, medicine, IV fluid, and tube feedings. · Durable power of  for healthcare [facility]): This is a written record that states who you want to make healthcare choices for you when you are unable to make them for yourself. This person, called a proxy, is usually a family member or a friend. You may choose more than 1 proxy. · Do not resuscitate (DNR) order:  A DNR order is used in case your heart stops beating or you stop breathing. It is a request not to have certain forms of treatment, such as CPR. A DNR order may be included in other types of advance directives. · Medical directive: This covers the care that you want if you are in a coma, near death, or unable to make decisions for yourself. You can list the treatments you want for each condition. Treatment may include pain medicine, surgery, blood transfusions, dialysis, IV or tube feedings, and a ventilator (breathing machine). · Values history: This document has questions about your views, beliefs, and how you feel and think about life. This information can help others choose the care that you would choose. Why are advance directives important? An advance directive helps you control your care. Although spoken wishes may be used, it is better to have your wishes written down. Spoken wishes can be misunderstood, or not followed. Treatments may be given even if you do not want them. An advance directive may make it easier for your family to make difficult choices about your care.    Urinary Incontinence   Urinary incontinence (UI)  is when you lose control of your bladder. UI develops because your bladder cannot store or empty urine properly. The 3 most common types of UI are stress incontinence, urge incontinence, or both. Medicines:   · May be given to help strengthen your bladder control. Report any side effects of medication to your healthcare provider. Do pelvic muscle exercises often:  Your pelvic muscles help you stop urinating. Squeeze these muscles tight for 5 seconds, then relax for 5 seconds. Gradually work up to squeezing for 10 seconds. Do 3 sets of 15 repetitions a day, or as directed. This will help strengthen your pelvic muscles and improve bladder control. Train your bladder:  Go to the bathroom at set times, such as every 2 hours, even if you do not feel the urge to go. You can also try to hold your urine when you feel the urge to go. For example, hold your urine for 5 minutes when you feel the urge to go. As that becomes easier, hold your urine for 10 minutes. Self-care:   · Keep a UI record. Write down how often you leak urine and how much you leak. Make a note of what you were doing when you leaked urine. · Drink liquids as directed. You may need to limit the amount of liquid you drink to help control your urine leakage. Do not drink any liquid right before you go to bed. Limit or do not have drinks that contain caffeine or alcohol. · Prevent constipation. Eat a variety of high-fiber foods. Good examples are high-fiber cereals, beans, vegetables, and whole-grain breads. Walking is the best way to trigger your intestines to have a bowel movement. · Exercise regularly and maintain a healthy weight. Weight loss and exercise will decrease pressure on your bladder and help you control your leakage. · Use a catheter as directed  to help empty your bladder. A catheter is a tiny, plastic tube that is put into your bladder to drain your urine. · Go to behavior therapy as directed.   Behavior therapy may be used to help you learn to control your urge to urinate. Weight Management   Why it is important to manage your weight:  Being overweight increases your risk of health conditions such as heart disease, high blood pressure, type 2 diabetes, and certain types of cancer. It can also increase your risk for osteoarthritis, sleep apnea, and other respiratory problems. Aim for a slow, steady weight loss. Even a small amount of weight loss can lower your risk of health problems. How to lose weight safely:  A safe and healthy way to lose weight is to eat fewer calories and get regular exercise. You can lose up about 1 pound a week by decreasing the number of calories you eat by 500 calories each day. Healthy meal plan for weight management:  A healthy meal plan includes a variety of foods, contains fewer calories, and helps you stay healthy. A healthy meal plan includes the following:  · Eat whole-grain foods more often. A healthy meal plan should contain fiber. Fiber is the part of grains, fruits, and vegetables that is not broken down by your body. Whole-grain foods are healthy and provide extra fiber in your diet. Some examples of whole-grain foods are whole-wheat breads and pastas, oatmeal, brown rice, and bulgur. · Eat a variety of vegetables every day. Include dark, leafy greens such as spinach, kale, damian greens, and mustard greens. Eat yellow and orange vegetables such as carrots, sweet potatoes, and winter squash. · Eat a variety of fruits every day. Choose fresh or canned fruit (canned in its own juice or light syrup) instead of juice. Fruit juice has very little or no fiber. · Eat low-fat dairy foods. Drink fat-free (skim) milk or 1% milk. Eat fat-free yogurt and low-fat cottage cheese. Try low-fat cheeses such as mozzarella and other reduced-fat cheeses. · Choose meat and other protein foods that are low in fat. Choose beans or other legumes such as split peas or lentils.  Choose fish, skinless poultry (chicken or turkey), or lean cuts of red meat (beef or pork). Before you cook meat or poultry, cut off any visible fat. · Use less fat and oil. Try baking foods instead of frying them. Add less fat, such as margarine, sour cream, regular salad dressing and mayonnaise to foods. Eat fewer high-fat foods. Some examples of high-fat foods include french fries, doughnuts, ice cream, and cakes. · Eat fewer sweets. Limit foods and drinks that are high in sugar. This includes candy, cookies, regular soda, and sweetened drinks. Exercise:  Exercise at least 30 minutes per day on most days of the week. Some examples of exercise include walking, biking, dancing, and swimming. You can also fit in more physical activity by taking the stairs instead of the elevator or parking farther away from stores. Ask your healthcare provider about the best exercise plan for you. © Copyright [employer] 2018 Information is for End User's use only and may not be sold, redistributed or otherwise used for commercial purposes.  All illustrations and images included in CareNotes® are the copyrighted property of [employer] Inc. or  [employer]

## 2023-09-01 ENCOUNTER — APPOINTMENT (OUTPATIENT)
Dept: LAB | Facility: MEDICAL CENTER | Age: 73
End: 2023-09-01
Payer: MEDICARE

## 2023-09-01 DIAGNOSIS — E55.9 VITAMIN D DEFICIENCY: ICD-10-CM

## 2023-09-01 DIAGNOSIS — Z00.00 MEDICARE ANNUAL WELLNESS VISIT, SUBSEQUENT: ICD-10-CM

## 2023-09-01 LAB
25(OH)D3 SERPL-MCNC: 42.2 NG/ML (ref 30–100)
ALBUMIN SERPL BCP-MCNC: 3.9 G/DL (ref 3.5–5)
ALP SERPL-CCNC: 87 U/L (ref 34–104)
ALT SERPL W P-5'-P-CCNC: 17 U/L (ref 7–52)
ANION GAP SERPL CALCULATED.3IONS-SCNC: 10 MMOL/L
AST SERPL W P-5'-P-CCNC: 17 U/L (ref 13–39)
BASOPHILS # BLD AUTO: 0.03 THOUSANDS/ÂΜL (ref 0–0.1)
BASOPHILS NFR BLD AUTO: 0 % (ref 0–1)
BILIRUB SERPL-MCNC: 0.95 MG/DL (ref 0.2–1)
BUN SERPL-MCNC: 12 MG/DL (ref 5–25)
CALCIUM SERPL-MCNC: 8.9 MG/DL (ref 8.4–10.2)
CHLORIDE SERPL-SCNC: 104 MMOL/L (ref 96–108)
CHOLEST SERPL-MCNC: 155 MG/DL
CO2 SERPL-SCNC: 26 MMOL/L (ref 21–32)
CREAT SERPL-MCNC: 0.63 MG/DL (ref 0.6–1.3)
EOSINOPHIL # BLD AUTO: 0.12 THOUSAND/ÂΜL (ref 0–0.61)
EOSINOPHIL NFR BLD AUTO: 2 % (ref 0–6)
ERYTHROCYTE [DISTWIDTH] IN BLOOD BY AUTOMATED COUNT: 13.1 % (ref 11.6–15.1)
GFR SERPL CREATININE-BSD FRML MDRD: 89 ML/MIN/1.73SQ M
GLUCOSE P FAST SERPL-MCNC: 92 MG/DL (ref 65–99)
HCT VFR BLD AUTO: 40.6 % (ref 34.8–46.1)
HDLC SERPL-MCNC: 60 MG/DL
HGB BLD-MCNC: 13.1 G/DL (ref 11.5–15.4)
IMM GRANULOCYTES # BLD AUTO: 0.01 THOUSAND/UL (ref 0–0.2)
IMM GRANULOCYTES NFR BLD AUTO: 0 % (ref 0–2)
LDLC SERPL CALC-MCNC: 75 MG/DL (ref 0–100)
LYMPHOCYTES # BLD AUTO: 1.71 THOUSANDS/ÂΜL (ref 0.6–4.47)
LYMPHOCYTES NFR BLD AUTO: 25 % (ref 14–44)
MCH RBC QN AUTO: 27.5 PG (ref 26.8–34.3)
MCHC RBC AUTO-ENTMCNC: 32.3 G/DL (ref 31.4–37.4)
MCV RBC AUTO: 85 FL (ref 82–98)
MONOCYTES # BLD AUTO: 0.47 THOUSAND/ÂΜL (ref 0.17–1.22)
MONOCYTES NFR BLD AUTO: 7 % (ref 4–12)
NEUTROPHILS # BLD AUTO: 4.51 THOUSANDS/ÂΜL (ref 1.85–7.62)
NEUTS SEG NFR BLD AUTO: 66 % (ref 43–75)
NONHDLC SERPL-MCNC: 95 MG/DL
NRBC BLD AUTO-RTO: 0 /100 WBCS
PLATELET # BLD AUTO: 279 THOUSANDS/UL (ref 149–390)
PMV BLD AUTO: 9.5 FL (ref 8.9–12.7)
POTASSIUM SERPL-SCNC: 4.1 MMOL/L (ref 3.5–5.3)
PROT SERPL-MCNC: 6.4 G/DL (ref 6.4–8.4)
RBC # BLD AUTO: 4.76 MILLION/UL (ref 3.81–5.12)
SODIUM SERPL-SCNC: 140 MMOL/L (ref 135–147)
TRIGL SERPL-MCNC: 98 MG/DL
WBC # BLD AUTO: 6.85 THOUSAND/UL (ref 4.31–10.16)

## 2023-09-01 PROCEDURE — 80053 COMPREHEN METABOLIC PANEL: CPT

## 2023-09-01 PROCEDURE — 85025 COMPLETE CBC W/AUTO DIFF WBC: CPT

## 2023-09-01 PROCEDURE — 82306 VITAMIN D 25 HYDROXY: CPT

## 2023-09-01 PROCEDURE — 80061 LIPID PANEL: CPT

## 2023-09-01 PROCEDURE — 36415 COLL VENOUS BLD VENIPUNCTURE: CPT

## 2023-09-07 ENCOUNTER — OFFICE VISIT (OUTPATIENT)
Dept: GASTROENTEROLOGY | Facility: CLINIC | Age: 73
End: 2023-09-07
Payer: MEDICARE

## 2023-09-07 VITALS
TEMPERATURE: 97.6 F | HEIGHT: 63 IN | SYSTOLIC BLOOD PRESSURE: 151 MMHG | OXYGEN SATURATION: 99 % | RESPIRATION RATE: 16 BRPM | DIASTOLIC BLOOD PRESSURE: 76 MMHG | WEIGHT: 158 LBS | HEART RATE: 76 BPM | BODY MASS INDEX: 28 KG/M2

## 2023-09-07 DIAGNOSIS — R11.10 VOMITING AND DIARRHEA: ICD-10-CM

## 2023-09-07 DIAGNOSIS — R19.7 VOMITING AND DIARRHEA: ICD-10-CM

## 2023-09-07 DIAGNOSIS — R10.13 EPIGASTRIC PAIN: Primary | ICD-10-CM

## 2023-09-07 DIAGNOSIS — Z12.11 SCREENING FOR COLON CANCER: ICD-10-CM

## 2023-09-07 PROCEDURE — 99213 OFFICE O/P EST LOW 20 MIN: CPT | Performed by: NURSE PRACTITIONER

## 2023-09-07 RX ORDER — OMEPRAZOLE 40 MG/1
CAPSULE, DELAYED RELEASE ORAL
Qty: 90 CAPSULE | Refills: 1 | Status: CANCELLED | OUTPATIENT
Start: 2023-09-07

## 2023-09-07 NOTE — PATIENT INSTRUCTIONS
Call/send message when down to 2 weeks worth of Omeprazole 40 mg to discuss decreasing down to 20 mg and go from there. Schedule a follow up OV in 1 year, unless you experience any red flag symptoms. While the patient was in the office today we did review GI red flag symptoms, including, but not limited to: chronic nausea, vomiting, diarrhea, chills, fever, and unintentional weight loss and should call or contact our office with any changes or concerns. I reviewed with the patient that if they notice any blood while vomiting or in their stool they should contact or office or go to the nearest emergency room for immediate evaluation. The patient was agreeable and verbalized an understanding.

## 2023-09-07 NOTE — PROGRESS NOTES
[facility] - Outpatient Follow-up Note  Jonathan Freitas 67 y.o. female MRN: [id number]  Encounter: [id number]          ASSESSMENT AND PLAN:    The patient presents today for follow up after her recent EGD and Colonoscopy. Exam: Abdomen soft, flat, non-tender with normal BS x 4. No edema noted of the b/l lower extremities upon exam today. Skin is non-icteric. 1. Epigastric pain  Resolved  At this point we decided to continue the omeprazole 40 mg a day for now as she has at least 2 bottles at home and she is going to contact our office when she has 2 weeks left of the omeprazole to give us an update and at that point we will decide whether or not to titrate her down to 20 mg a day and then eventually see if we can come off the omeprazole altogether since her symptoms have improved and have remained stable. The patient was agreeable and verbalized an understanding. 2. Vomiting and diarrhea  Resolved    3. Screening for Colon Cancer  I discussed with the patient that at this point time since overall her symptoms have improved and are stable and her colonoscopy did show a tubular adenoma, as per recommendations we will follow-up with a repeat colonoscopy in 7 years that she would be due on: 7/14/30, unless she would experience any red flag symptoms. The patient was agreeable and verbalized an understanding. While the patient was in the office today we did review GI red flag symptoms, including, but not limited to: chronic nausea, vomiting, diarrhea, chills, fever, and unintentional weight loss and should call or contact our office with any changes or concerns. I reviewed with the patient that if they notice any blood while vomiting or in their stool they should contact or office or go to the nearest emergency room for immediate evaluation. The patient was agreeable and verbalized an understanding.     The patient will schedule a follow up office visit in 1 year, but understands to call or contact our office if there are any issues or concerns in the mean time. ______________________________________________________________________    SUBJECTIVE: Patient is a 67 y.o. female who was previously seen in our office on 23 and presents today for follow-up office visit for follow up after her recent EGD and Colonoscopy. The patient denies any reflux, nausea, dysphagia, vomiting, decreased appetite, unplanned weight loss, or abdominal pain. Imaging: (None):     Endoscopy History: EGD: (23): Gastric polyp. Path: Normal.      COLONOSCOPY: (23): 1 Tubular Adenoma. Repeat colonoscopy in 7 years. DUE: 30    REVIEW OF SYSTEMS IS OTHERWISE NEGATIVE.       Historical Information   Past Medical History:   Diagnosis Date   • Achilles tendon tear ,    bilateral   • Allergic rhinitis due to pollen    • Anxiety    • Colon polyp    • Depression    • Endometrial cancer ([address]) 3/22/2017   • History of rectal bleeding 2023   • HPV (human papilloma virus) infection    • Hyperlipidemia    • Kidney stones     renal calculi   • Mass of neck    • Osteoporosis    • Uterine cancer ([address])    • UTI (urinary tract infection)      Past Surgical History:   Procedure Laterality Date   •  SECTION  4255,1411   • COLONOSCOPY     • DXA PROCEDURE (HISTORICAL)     • HYSTERECTOMY Bilateral     salpino-ooph   • OOPHORECTOMY Bilateral    • TENDON REPAIR  ,    Archilles tendon repair-bilateral     Social History   Social History     Substance and Sexual Activity   Alcohol Use Yes   • Alcohol/week: 2.0 standard drinks of alcohol   • Types: 1 Glasses of wine, 1 Cans of beer per week    Comment: social     Social History     Substance and Sexual Activity   Drug Use Never     Social History     Tobacco Use   Smoking Status Never   Smokeless Tobacco Never     Family History   Adopted: Yes   Problem Relation Age of Onset   • Breast cancer Daughter 39 • Parkinsonism Other    • Seizures Other        Meds/Allergies       Current Outpatient Medications:   •  atorvastatin (LIPITOR) 10 mg tablet  •  brimonidine (ALPHAGAN P) 0.15 % ophthalmic solution  •  Calcium Citrate-Vitamin D (CALCIUM CITRATE + D PO)  •  carboxymethylcellulose 0.5 % SOLN  •  cycloSPORINE (RESTASIS) 0.05 % ophthalmic emulsion  •  glucosamine-chondroitin 500-400 MG tablet  •  mometasone (NASONEX) 50 mcg/act nasal spray  •  multivitamin (THERAGRAN) TABS  •  Omega-3 Fatty Acids (FISH OIL PO)  •  omeprazole (PriLOSEC) 40 MG capsule  •  SM VITAMIN D3 4000 units CAPS    Allergies   Allergen Reactions   • Cefaclor Other (See Comments)     swelling and itching of hands and feet   • Cephalosporins Itching   • Prednisone Other (See Comments)     disconnected feeling           Objective     Blood pressure 151/76, pulse 76, temperature 97.6 °F (36.4 °C), temperature source Tympanic, resp. rate 16, height 5' 2.5" (1.588 m), weight 71.7 kg (158 lb), SpO2 99 %. Body mass index is 28.44 kg/m². PHYSICAL EXAM:      General Appearance:   Alert, cooperative, no distress   HEENT:   Normocephalic, atraumatic, anicteric. Neck:  Supple, symmetrical, trachea midline   Lungs:   Clear to auscultation bilaterally; no rales, rhonchi or wheezing; respirations unlabored    Heart[de-identified]   Regular rate and rhythm; no murmur, rub, or gallop. Abdomen:   Soft, non-tender, non-distended; normal bowel sounds; no masses, no organomegaly    Genitalia:   Deferred    Rectal:   Deferred    Extremities:  No cyanosis, clubbing or edema    Pulses:  2+ and symmetric    Skin:  No jaundice, rashes, or lesions    Lymph nodes:  No palpable cervical lymphadenopathy        Lab Results:   No visits with results within 1 Day(s) from this visit.    Latest known visit with results is:   Appointment on 09/01/2023   Component Date Value   • Cholesterol 09/01/2023 155    • Triglycerides 09/01/2023 98    • HDL, Direct 09/01/2023 60    • LDL Calculated 09/01/2023 75    • Non-HDL-Chol (CHOL-HDL) 09/01/2023 95    • Sodium 09/01/2023 140    • Potassium 09/01/2023 4.1    • Chloride 09/01/2023 104    • CO2 09/01/2023 26    • ANION GAP 09/01/2023 10    • BUN 09/01/2023 12    • Creatinine 09/01/2023 0.63    • Glucose, Fasting 09/01/2023 92    • Calcium 09/01/2023 8.9    • AST 09/01/2023 17    • ALT 09/01/2023 17    • Alkaline Phosphatase 09/01/2023 87    • Total Protein 09/01/2023 6.4    • Albumin 09/01/2023 3.9    • Total Bilirubin 09/01/2023 0.95    • eGFR 09/01/2023 89    • Vit D, 25-Hydroxy 09/01/2023 42.2    • WBC 09/01/2023 6.85    • RBC 09/01/2023 4.76    • Hemoglobin 09/01/2023 13.1    • Hematocrit 09/01/2023 40.6    • MCV 09/01/2023 85    • MCH 09/01/2023 27.5    • MCHC 09/01/2023 32.3    • RDW 09/01/2023 13.1    • MPV 09/01/2023 9.5    • Platelets 01/33/5766 279    • nRBC 09/01/2023 0    • Neutrophils Relative 09/01/2023 66    • Immat GRANS % 09/01/2023 0    • Lymphocytes Relative 09/01/2023 25    • Monocytes Relative 09/01/2023 7    • Eosinophils Relative 09/01/2023 2    • Basophils Relative 09/01/2023 0    • Neutrophils Absolute 09/01/2023 4.51    • Immature Grans Absolute 09/01/2023 0.01    • Lymphocytes Absolute 09/01/2023 1.71    • Monocytes Absolute 09/01/2023 0.47    • Eosinophils Absolute 09/01/2023 0.12    • Basophils Absolute 09/01/2023 0.03          Radiology Results:   No results found. Answers for HPI/ROS submitted by the patient on 9/6/2023  Chronicity: recurrent  Onset: more than 1 year ago  Onset quality: sudden  Frequency: rarely  Episode duration: 1 Hours  Progression since onset: resolved  Pain location: epigastric region  Pain - numeric: 8/10  Pain quality: sharp  Radiates to: does not radiate  anorexia: No  arthralgias: No  belching: No  constipation: No  diarrhea: No  dysuria: No  fever: No  flatus: Yes  frequency: No  headaches: No  hematochezia: No  hematuria: No  melena: No  myalgias: No  nausea:  No  weight loss: No  vomiting: No  Aggravated by: nothing  Relieved by: bowel movements  Diagnostic workup: CT scan, lower endoscopy, upper endoscopy

## 2023-09-18 DIAGNOSIS — E78.5 HYPERLIPIDEMIA, UNSPECIFIED HYPERLIPIDEMIA TYPE: ICD-10-CM

## 2023-09-18 RX ORDER — ATORVASTATIN CALCIUM 10 MG/1
TABLET, FILM COATED ORAL
Qty: 90 TABLET | Refills: 0 | Status: SHIPPED | OUTPATIENT
Start: 2023-09-18

## 2023-11-06 PROBLEM — Z12.11 SCREENING FOR COLON CANCER: Status: RESOLVED | Noted: 2023-08-30 | Resolved: 2023-11-06

## 2023-11-08 ENCOUNTER — HOSPITAL ENCOUNTER (OUTPATIENT)
Dept: MAMMOGRAPHY | Facility: HOSPITAL | Age: 73
Discharge: HOME | End: 2023-11-08
Payer: MEDICARE

## 2023-11-08 VITALS — WEIGHT: 158 LBS | BODY MASS INDEX: 28 KG/M2 | HEIGHT: 63 IN

## 2023-11-08 DIAGNOSIS — Z12.31 OTHER SCREENING MAMMOGRAM: ICD-10-CM

## 2023-11-08 DIAGNOSIS — Z12.31 ENCOUNTER FOR SCREENING MAMMOGRAM FOR MALIGNANT NEOPLASM OF BREAST: ICD-10-CM

## 2023-11-08 PROCEDURE — 77067 SCR MAMMO BI INCL CAD: CPT

## 2023-11-08 PROCEDURE — 77063 BREAST TOMOSYNTHESIS BI: CPT

## 2023-11-29 DIAGNOSIS — E78.5 HYPERLIPIDEMIA, UNSPECIFIED HYPERLIPIDEMIA TYPE: ICD-10-CM

## 2023-11-29 RX ORDER — ATORVASTATIN CALCIUM 10 MG/1
TABLET, FILM COATED ORAL
Qty: 90 TABLET | Refills: 0 | Status: SHIPPED | OUTPATIENT
Start: 2023-11-29 | End: 2023-12-04 | Stop reason: SDUPTHER

## 2023-12-04 DIAGNOSIS — E78.5 HYPERLIPIDEMIA, UNSPECIFIED HYPERLIPIDEMIA TYPE: ICD-10-CM

## 2023-12-04 RX ORDER — ATORVASTATIN CALCIUM 10 MG/1
10 TABLET, FILM COATED ORAL DAILY
Qty: 90 TABLET | Refills: 0 | Status: SHIPPED | OUTPATIENT
Start: 2023-12-04

## 2024-01-08 ENCOUNTER — NURSE TRIAGE (OUTPATIENT)
Age: 74
End: 2024-01-08

## 2024-01-08 DIAGNOSIS — R10.13 EPIGASTRIC PAIN: Primary | ICD-10-CM

## 2024-01-08 RX ORDER — OMEPRAZOLE 20 MG/1
20 CAPSULE, DELAYED RELEASE ORAL DAILY
Qty: 90 CAPSULE | Refills: 3 | Status: SHIPPED | OUTPATIENT
Start: 2024-01-08

## 2024-01-08 NOTE — TELEPHONE ENCOUNTER
"Next OV 8/23/24.    Pt called to provide update. States she is doing well on omeprazole 40 mg and would like to decrease to 20 mg as discussed at last OV on 9/7/23. Pt has run out of 40 mg. Pt uses [facility]. Medication queued for provider sign-off    Ok to leave detailed message if needed at [phone].  Reason for Disposition  • Caller requesting a prescription renewal (no refills left), no triage required, and triager able to refill per department policy    Answer Assessment - Initial Assessment Questions  1. NAME of MEDICATION: \"What medicine are you calling about?\"      Omeprazole  2. QUESTION: \"What is your question?\" (e.g., medication refill, side effect)      Would like to decrease to 20 mg.  3. PRESCRIBING HCP: \"Who prescribed it?\" Reason: if prescribed by specialist, call should be referred to that group.      GI    Protocols used: Medication Question Call-ADULT-[place]    "

## 2024-02-08 ENCOUNTER — RA CDI HCC (OUTPATIENT)
Dept: OTHER | Facility: HOSPITAL | Age: 74
End: 2024-02-08

## 2024-02-15 ENCOUNTER — OFFICE VISIT (OUTPATIENT)
Dept: FAMILY MEDICINE CLINIC | Facility: CLINIC | Age: 74
End: 2024-02-15
Payer: MEDICARE

## 2024-02-15 ENCOUNTER — APPOINTMENT (OUTPATIENT)
Dept: LAB | Facility: MEDICAL CENTER | Age: 74
End: 2024-02-15
Payer: MEDICARE

## 2024-02-15 VITALS
BODY MASS INDEX: 28.35 KG/M2 | WEIGHT: 160 LBS | HEIGHT: 63 IN | OXYGEN SATURATION: 98 % | HEART RATE: 81 BPM | SYSTOLIC BLOOD PRESSURE: 130 MMHG | DIASTOLIC BLOOD PRESSURE: 82 MMHG

## 2024-02-15 DIAGNOSIS — E55.9 VITAMIN D DEFICIENCY: ICD-10-CM

## 2024-02-15 DIAGNOSIS — E78.5 HYPERLIPIDEMIA, UNSPECIFIED HYPERLIPIDEMIA TYPE: ICD-10-CM

## 2024-02-15 DIAGNOSIS — J31.0 CHRONIC RHINITIS: ICD-10-CM

## 2024-02-15 DIAGNOSIS — E78.5 HYPERLIPIDEMIA, UNSPECIFIED HYPERLIPIDEMIA TYPE: Primary | ICD-10-CM

## 2024-02-15 DIAGNOSIS — R10.13 EPIGASTRIC PAIN: ICD-10-CM

## 2024-02-15 DIAGNOSIS — N20.0 NEPHROLITHIASIS: ICD-10-CM

## 2024-02-15 LAB
25(OH)D3 SERPL-MCNC: 34.3 NG/ML (ref 30–100)
ALBUMIN SERPL BCP-MCNC: 4.3 G/DL (ref 3.5–5)
ALP SERPL-CCNC: 85 U/L (ref 34–104)
ALT SERPL W P-5'-P-CCNC: 20 U/L (ref 7–52)
ANION GAP SERPL CALCULATED.3IONS-SCNC: 10 MMOL/L
AST SERPL W P-5'-P-CCNC: 17 U/L (ref 13–39)
BILIRUB SERPL-MCNC: 0.81 MG/DL (ref 0.2–1)
BUN SERPL-MCNC: 21 MG/DL (ref 5–25)
CALCIUM SERPL-MCNC: 9.4 MG/DL (ref 8.4–10.2)
CHLORIDE SERPL-SCNC: 104 MMOL/L (ref 96–108)
CHOLEST SERPL-MCNC: 171 MG/DL
CO2 SERPL-SCNC: 29 MMOL/L (ref 21–32)
CREAT SERPL-MCNC: 0.67 MG/DL (ref 0.6–1.3)
GFR SERPL CREATININE-BSD FRML MDRD: 86 ML/MIN/1.73SQ M
GLUCOSE P FAST SERPL-MCNC: 100 MG/DL (ref 65–99)
HDLC SERPL-MCNC: 68 MG/DL
LDLC SERPL CALC-MCNC: 85 MG/DL (ref 0–100)
NONHDLC SERPL-MCNC: 103 MG/DL
POTASSIUM SERPL-SCNC: 4.2 MMOL/L (ref 3.5–5.3)
PROT SERPL-MCNC: 6.7 G/DL (ref 6.4–8.4)
SODIUM SERPL-SCNC: 143 MMOL/L (ref 135–147)
TRIGL SERPL-MCNC: 88 MG/DL

## 2024-02-15 PROCEDURE — 80061 LIPID PANEL: CPT

## 2024-02-15 PROCEDURE — 36415 COLL VENOUS BLD VENIPUNCTURE: CPT

## 2024-02-15 PROCEDURE — 99214 OFFICE O/P EST MOD 30 MIN: CPT | Performed by: PHYSICIAN ASSISTANT

## 2024-02-15 PROCEDURE — 80053 COMPREHEN METABOLIC PANEL: CPT

## 2024-02-15 PROCEDURE — 82306 VITAMIN D 25 HYDROXY: CPT

## 2024-02-15 RX ORDER — MOMETASONE FUROATE 50 UG/1
2 SPRAY, METERED NASAL DAILY
Qty: 17 G | Refills: 3 | Status: SHIPPED | OUTPATIENT
Start: 2024-02-15

## 2024-02-15 NOTE — PROGRESS NOTES
Name: Lucy Mars      : 1950      MRN: [id number]  Encounter Provider: Lizbeth Lemons PA-C  Encounter Date: 2/15/2024   Encounter department: [facility]    Assessment & Plan     1. Hyperlipidemia, unspecified hyperlipidemia type  Assessment & Plan:  Routine labs ordered.  Continue atorvastatin 10 mg daily.  Recommended low-fat diet.    Orders:  -     Comprehensive metabolic panel; Future  -     Lipid panel; Future    2. Epigastric pain  Assessment & Plan:  Improved.  Continue omeprazole 20 mg daily.  Continue to follow with gastroenterology.      3. Vitamin D deficiency  Assessment & Plan:  Labs ordered to evaluate.  Continue vitamin D supplementation.    Orders:  -     Vitamin D 25 hydroxy; Future    4. Nephrolithiasis  Assessment & Plan:  No recent kidney stones.  Continue to follow with urology.      5. Chronic rhinitis  -     mometasone (NASONEX) 50 mcg/act nasal spray; 2 sprays into each nostril daily           Mason Mayorga is a very pleasant 73-year-old female who is here today for a 6-month follow-up.  She is still following with gastroenterology.  They decreased her dose of omeprazole to 20 mg daily.  This has been helping with her symptoms.  She has not had any breakthrough acid reflux.  She denies any changes to her appetite or stools.  She is doing well on her cholesterol-lowering medication.  She denies any side effects.  She is due for labs.  She continues to follow yearly with urology.  She has not had any recent renal colic.      Review of Systems   Constitutional:  Negative for chills, diaphoresis, fatigue and fever.   HENT:  Negative for congestion, ear pain, postnasal drip, rhinorrhea, sneezing, sore throat and trouble swallowing.    Eyes:  Negative for pain and visual disturbance.   Respiratory:  Negative for apnea, cough, shortness of breath and wheezing.    Cardiovascular:  Negative for chest pain and palpitations.   Gastrointestinal:  Negative for abdominal  "pain, constipation, diarrhea, nausea and vomiting.   Genitourinary:  Negative for dysuria and hematuria.   Musculoskeletal:  Negative for arthralgias, back pain, gait problem and myalgias.   Skin:  Negative for color change and rash.   Neurological:  Negative for dizziness, seizures, syncope, weakness, light-headedness, numbness and headaches.   Psychiatric/Behavioral:  Negative for suicidal ideas. The patient is not nervous/anxious.    All other systems reviewed and are negative.      Current Outpatient Medications on File Prior to Visit   Medication Sig   • atorvastatin (LIPITOR) 10 mg tablet Take 1 tablet (10 mg total) by mouth daily   • brimonidine (ALPHAGAN P) 0.15 % ophthalmic solution 1 drop 2 (two) times a day   • Calcium Citrate-Vitamin D (CALCIUM CITRATE + D PO) Take by mouth   • carboxymethylcellulose 0.5 % SOLN 1 drop 3 (three) times a day as needed for dry eyes   • cycloSPORINE (RESTASIS) 0.05 % ophthalmic emulsion Apply 1 drop to eye   • glucosamine-chondroitin 500-400 MG tablet Take 1 tablet by mouth   • multivitamin (THERAGRAN) TABS Take 1 tablet by mouth   • Omega-3 Fatty Acids (FISH OIL PO) Take 2 g by mouth   • omeprazole (PriLOSEC) 20 mg delayed release capsule Take 1 capsule (20 mg total) by mouth daily Take 30 mins prior to a meal.   • SM VITAMIN D3 4000 units CAPS Take 2,000 Units by mouth daily    • [DISCONTINUED] mometasone (NASONEX) 50 mcg/act nasal spray SHAKE LQ AND U 2 SPRAYS IEN D   • [DISCONTINUED] omeprazole (PriLOSEC) 40 MG capsule Take 1 PO QD 30 mins prior to a meal.       Objective     /82   Pulse 81   Ht 5' 2.5\" (1.588 m)   Wt 72.6 kg (160 lb)   LMP  (LMP Unknown)   SpO2 98%   BMI 28.80 kg/m²     Physical Exam  Vitals and nursing note reviewed.   Constitutional:       Appearance: She is well-developed.   HENT:      Head: Normocephalic and atraumatic.      Right Ear: External ear normal.      Left Ear: External ear normal.      Nose: Nose normal.   Eyes:      " Extraocular Movements: Extraocular movements intact.   Cardiovascular:      Rate and Rhythm: Normal rate and regular rhythm.      Heart sounds: Normal heart sounds. No murmur heard.     No friction rub. No gallop.   Pulmonary:      Effort: Pulmonary effort is normal. No respiratory distress.      Breath sounds: Normal breath sounds. No wheezing or rales.   Abdominal:      General: Bowel sounds are normal.   Musculoskeletal:         General: Normal range of motion.      Cervical back: Normal range of motion and neck supple.   Skin:     General: Skin is warm and dry.   Neurological:      Mental Status: She is alert and oriented to person, place, and time.   Psychiatric:         Behavior: Behavior normal.         Thought Content: Thought content normal.         Judgment: Judgment normal.       Lizbeth Lemons PA-C

## 2024-02-21 DIAGNOSIS — E78.5 HYPERLIPIDEMIA, UNSPECIFIED HYPERLIPIDEMIA TYPE: ICD-10-CM

## 2024-02-21 RX ORDER — ATORVASTATIN CALCIUM 10 MG/1
10 TABLET, FILM COATED ORAL DAILY
Qty: 90 TABLET | Refills: 0 | Status: SHIPPED | OUTPATIENT
Start: 2024-02-21

## 2024-05-06 ENCOUNTER — TELEPHONE (OUTPATIENT)
Age: 74
End: 2024-05-06

## 2024-05-06 DIAGNOSIS — Z12.31 SCREENING MAMMOGRAM FOR BREAST CANCER: Primary | ICD-10-CM

## 2024-05-06 NOTE — TELEPHONE ENCOUNTER
Patient called requesting a referral for a mammogram. Last mammo was on 11/8/2023. She wants to have one available for when she's due in the fall.

## 2024-06-17 DIAGNOSIS — E78.5 HYPERLIPIDEMIA, UNSPECIFIED HYPERLIPIDEMIA TYPE: ICD-10-CM

## 2024-06-17 RX ORDER — ATORVASTATIN CALCIUM 10 MG/1
10 TABLET, FILM COATED ORAL DAILY
Qty: 90 TABLET | Refills: 1 | Status: SHIPPED | OUTPATIENT
Start: 2024-06-17

## 2024-08-23 ENCOUNTER — APPOINTMENT (EMERGENCY)
Dept: CT IMAGING | Facility: HOSPITAL | Age: 74
End: 2024-08-23
Payer: MEDICARE

## 2024-08-23 ENCOUNTER — NURSE TRIAGE (OUTPATIENT)
Age: 74
End: 2024-08-23

## 2024-08-23 ENCOUNTER — HOSPITAL ENCOUNTER (EMERGENCY)
Facility: HOSPITAL | Age: 74
Discharge: HOME | End: 2024-08-23
Attending: EMERGENCY MEDICINE
Payer: MEDICARE

## 2024-08-23 VITALS
BODY MASS INDEX: 28.49 KG/M2 | TEMPERATURE: 97.9 F | RESPIRATION RATE: 18 BRPM | WEIGHT: 158.29 LBS | OXYGEN SATURATION: 98 % | SYSTOLIC BLOOD PRESSURE: 166 MMHG | HEART RATE: 91 BPM | DIASTOLIC BLOOD PRESSURE: 82 MMHG

## 2024-08-23 DIAGNOSIS — R10.9 ABDOMINAL PAIN: Primary | ICD-10-CM

## 2024-08-23 DIAGNOSIS — K52.9 COLITIS: ICD-10-CM

## 2024-08-23 DIAGNOSIS — R19.7 NAUSEA, VOMITING AND DIARRHEA: ICD-10-CM

## 2024-08-23 DIAGNOSIS — R11.2 NAUSEA, VOMITING AND DIARRHEA: ICD-10-CM

## 2024-08-23 LAB
ALBUMIN SERPL BCG-MCNC: 4.6 G/DL (ref 3.5–5)
ALP SERPL-CCNC: 112 U/L (ref 34–104)
ALT SERPL W P-5'-P-CCNC: 23 U/L (ref 7–52)
ANION GAP SERPL CALCULATED.3IONS-SCNC: 11 MMOL/L (ref 4–13)
APTT PPP: 34 SECONDS (ref 23–34)
AST SERPL W P-5'-P-CCNC: 20 U/L (ref 13–39)
BASOPHILS # BLD MANUAL: 0 THOUSAND/UL (ref 0–0.1)
BASOPHILS NFR MAR MANUAL: 0 % (ref 0–1)
BILIRUB SERPL-MCNC: 1.16 MG/DL (ref 0.2–1)
BILIRUB UR QL STRIP: NEGATIVE
BUN SERPL-MCNC: 16 MG/DL (ref 5–25)
CALCIUM SERPL-MCNC: 9.6 MG/DL (ref 8.4–10.2)
CHLORIDE SERPL-SCNC: 99 MMOL/L (ref 96–108)
CLARITY UR: CLEAR
CO2 SERPL-SCNC: 27 MMOL/L (ref 21–32)
COLOR UR: COLORLESS
CREAT SERPL-MCNC: 0.74 MG/DL (ref 0.6–1.3)
EOSINOPHIL # BLD MANUAL: 0 THOUSAND/UL (ref 0–0.4)
EOSINOPHIL NFR BLD MANUAL: 0 % (ref 0–6)
ERYTHROCYTE [DISTWIDTH] IN BLOOD BY AUTOMATED COUNT: 13.2 % (ref 11.6–15.1)
GFR SERPL CREATININE-BSD FRML MDRD: 80 ML/MIN/1.73SQ M
GLUCOSE SERPL-MCNC: 139 MG/DL (ref 65–140)
GLUCOSE UR STRIP-MCNC: NEGATIVE MG/DL
HCT VFR BLD AUTO: 48.9 % (ref 34.8–46.1)
HGB BLD-MCNC: 15.8 G/DL (ref 11.5–15.4)
HGB UR QL STRIP.AUTO: NEGATIVE
INR PPP: 0.93 (ref 0.85–1.19)
KETONES UR STRIP-MCNC: NEGATIVE MG/DL
LACTATE SERPL-SCNC: 2.7 MMOL/L (ref 0.5–2)
LEUKOCYTE ESTERASE UR QL STRIP: NEGATIVE
LIPASE SERPL-CCNC: 15 U/L (ref 11–82)
LYMPHOCYTES # BLD AUTO: 1 THOUSAND/UL (ref 0.6–4.47)
LYMPHOCYTES # BLD AUTO: 8 % (ref 14–44)
MCH RBC QN AUTO: 27.5 PG (ref 26.8–34.3)
MCHC RBC AUTO-ENTMCNC: 32.3 G/DL (ref 31.4–37.4)
MCV RBC AUTO: 85 FL (ref 82–98)
MONOCYTES # BLD AUTO: 0.13 THOUSAND/UL (ref 0–1.22)
MONOCYTES NFR BLD: 1 % (ref 4–12)
NEUTROPHILS # BLD MANUAL: 11.39 THOUSAND/UL (ref 1.85–7.62)
NEUTS BAND NFR BLD MANUAL: 1 % (ref 0–8)
NEUTS SEG NFR BLD AUTO: 90 % (ref 43–75)
NITRITE UR QL STRIP: NEGATIVE
OVALOCYTES BLD QL SMEAR: PRESENT
PH UR STRIP.AUTO: 7 [PH]
PLATELET # BLD AUTO: 321 THOUSANDS/UL (ref 149–390)
PLATELET BLD QL SMEAR: ADEQUATE
PMV BLD AUTO: 8.9 FL (ref 8.9–12.7)
POTASSIUM SERPL-SCNC: 4.1 MMOL/L (ref 3.5–5.3)
PROT SERPL-MCNC: 7.4 G/DL (ref 6.4–8.4)
PROT UR STRIP-MCNC: NEGATIVE MG/DL
PROTHROMBIN TIME: 13 SECONDS (ref 12.3–15)
RBC # BLD AUTO: 5.75 MILLION/UL (ref 3.81–5.12)
RBC MORPH BLD: PRESENT
SODIUM SERPL-SCNC: 137 MMOL/L (ref 135–147)
SP GR UR STRIP.AUTO: <1.005 (ref 1–1.03)
UROBILINOGEN UR STRIP-ACNC: <2 MG/DL
WBC # BLD AUTO: 12.52 THOUSAND/UL (ref 4.31–10.16)

## 2024-08-23 PROCEDURE — 85007 BL SMEAR W/DIFF WBC COUNT: CPT | Performed by: PHYSICIAN ASSISTANT

## 2024-08-23 PROCEDURE — 96375 TX/PRO/DX INJ NEW DRUG ADDON: CPT

## 2024-08-23 PROCEDURE — 36415 COLL VENOUS BLD VENIPUNCTURE: CPT | Performed by: PHYSICIAN ASSISTANT

## 2024-08-23 PROCEDURE — 96365 THER/PROPH/DIAG IV INF INIT: CPT

## 2024-08-23 PROCEDURE — 83605 ASSAY OF LACTIC ACID: CPT | Performed by: PHYSICIAN ASSISTANT

## 2024-08-23 PROCEDURE — 80053 COMPREHEN METABOLIC PANEL: CPT | Performed by: PHYSICIAN ASSISTANT

## 2024-08-23 PROCEDURE — 99285 EMERGENCY DEPT VISIT HI MDM: CPT | Performed by: PHYSICIAN ASSISTANT

## 2024-08-23 PROCEDURE — 81003 URINALYSIS AUTO W/O SCOPE: CPT | Performed by: PHYSICIAN ASSISTANT

## 2024-08-23 PROCEDURE — 85027 COMPLETE CBC AUTOMATED: CPT | Performed by: PHYSICIAN ASSISTANT

## 2024-08-23 PROCEDURE — 85730 THROMBOPLASTIN TIME PARTIAL: CPT | Performed by: PHYSICIAN ASSISTANT

## 2024-08-23 PROCEDURE — 83690 ASSAY OF LIPASE: CPT | Performed by: PHYSICIAN ASSISTANT

## 2024-08-23 PROCEDURE — 74177 CT ABD & PELVIS W/CONTRAST: CPT

## 2024-08-23 PROCEDURE — 99284 EMERGENCY DEPT VISIT MOD MDM: CPT

## 2024-08-23 PROCEDURE — 85610 PROTHROMBIN TIME: CPT | Performed by: PHYSICIAN ASSISTANT

## 2024-08-23 RX ORDER — ONDANSETRON 4 MG/1
4 TABLET, ORALLY DISINTEGRATING ORAL EVERY 6 HOURS PRN
Qty: 12 TABLET | Refills: 0 | Status: SHIPPED | OUTPATIENT
Start: 2024-08-23

## 2024-08-23 RX ORDER — ONDANSETRON 2 MG/ML
4 INJECTION INTRAMUSCULAR; INTRAVENOUS ONCE
Status: COMPLETED | OUTPATIENT
Start: 2024-08-23 | End: 2024-08-23

## 2024-08-23 RX ORDER — OMEPRAZOLE 40 MG/1
40 CAPSULE, DELAYED RELEASE ORAL DAILY
Qty: 30 CAPSULE | Refills: 0 | Status: SHIPPED | OUTPATIENT
Start: 2024-08-23

## 2024-08-23 RX ORDER — SODIUM CHLORIDE, SODIUM GLUCONATE, SODIUM ACETATE, POTASSIUM CHLORIDE, MAGNESIUM CHLORIDE, SODIUM PHOSPHATE, DIBASIC, AND POTASSIUM PHOSPHATE .53; .5; .37; .037; .03; .012; .00082 G/100ML; G/100ML; G/100ML; G/100ML; G/100ML; G/100ML; G/100ML
1000 INJECTION, SOLUTION INTRAVENOUS ONCE
Status: COMPLETED | OUTPATIENT
Start: 2024-08-23 | End: 2024-08-23

## 2024-08-23 RX ORDER — SUCRALFATE ORAL 1 G/10ML
1 SUSPENSION ORAL
Qty: 414 ML | Refills: 0 | Status: SHIPPED | OUTPATIENT
Start: 2024-08-23

## 2024-08-23 RX ORDER — DICYCLOMINE HYDROCHLORIDE 10 MG/1
10 CAPSULE ORAL
Qty: 40 CAPSULE | Refills: 0 | Status: SHIPPED | OUTPATIENT
Start: 2024-08-23

## 2024-08-23 RX ADMIN — IOHEXOL 100 ML: 350 INJECTION, SOLUTION INTRAVENOUS at 10:37

## 2024-08-23 RX ADMIN — ONDANSETRON 4 MG: 2 INJECTION INTRAMUSCULAR; INTRAVENOUS at 09:53

## 2024-08-23 RX ADMIN — SODIUM CHLORIDE, SODIUM GLUCONATE, SODIUM ACETATE, POTASSIUM CHLORIDE, MAGNESIUM CHLORIDE, SODIUM PHOSPHATE, DIBASIC, AND POTASSIUM PHOSPHATE 1000 ML: .53; .5; .37; .037; .03; .012; .00082 INJECTION, SOLUTION INTRAVENOUS at 09:53

## 2024-08-23 NOTE — TELEPHONE ENCOUNTER
"LOV 9/7/23 D Archie (epigastric pain), Procedure egd/colon 7/14/23    Patient awoke last night with sudden severe abdominal pain now moderate to severe, hard stool to diarrhea, noting BRB mushy stool now, vomited five times through the night. She is sipping at fluids. I advised patient report to ED for evaluation at this time and she is agreeable. She will have her  transport her.    Reason for Disposition   Patient sounds very sick or weak to the triager    Answer Assessment - Initial Assessment Questions  1. LOCATION: \"Where does it hurt?\"       Started above navel then to lower middle of night left side   2. RADIATION: \"Does the pain shoot anywhere else?\" (e.g., chest, back)      denies  3. ONSET: \"When did the pain begin?\" (e.g., minutes, hours or days ago)       Last night  4. SUDDEN: \"Gradual or sudden onset?\"      sudden  5. PATTERN \"Does the pain come and go, or is it constant?\"     - If constant: \"Is it getting better, staying the same, or worsening?\"       (Note: Constant means the pain never goes away completely; most serious pain is constant and it progresses)      - If intermittent: \"How long does it last?\" \"Do you have pain now?\"      (Note: Intermittent means the pain goes away completely between bouts)      Currently constant  6. SEVERITY: \"How bad is the pain?\"  (e.g., Scale 1-10; mild, moderate, or severe)    - MILD (1-3): doesn't interfere with normal activities, abdomen soft and not tender to touch     - MODERATE (4-7): interferes with normal activities or awakens from sleep, tender to touch     - SEVERE (8-10): excruciating pain, doubled over, unable to do any normal activities       Last night severe, high moderate to severe  7. RECURRENT SYMPTOM: \"Have you ever had this type of stomach pain before?\" If Yes, ask: \"When was the last time?\" and \"What happened that time?\"       same  8. CAUSE: \"What do you think is causing the stomach pain?\"      Flare as previous  9. RELIEVING/AGGRAVATING " "FACTORS: \"What makes it better or worse?\" (e.g., movement, antacids, bowel movement)      Some relief after passing stool  10. OTHER SYMPTOMS: \"Has there been any vomiting, diarrhea, constipation, or urine problems?\"        Retching, vomited x five times  11. PREGNANCY: \"Is there any chance you are pregnant?\" \"When was your last menstrual period?\"        N/A    Protocols used: Abdominal Pain - Female-ADULT-[place]    "

## 2024-08-23 NOTE — ED PROVIDER NOTES
"History  Chief Complaint   Patient presents with    Abdominal Pain     Reports starting at 1130 last night developing pain, vomiting, diarrhea. Reports had an appointment w/GI today but canceled.     73 year old female with PMH HLD, endometrial cancer, kidney stones, osteoporosis presenting ambulatory from home accompanied by spouse for evaluation of abdominal pain.  Pt reports she started with a \"flare\" last night.  She reports she has been having GI issues on and off.  Has been seen here previously for the same.  She reports last night after coming back from a convention she began having stomach pain.  She describes a generalized cramping and bloating in the stomach.  She notes she was constipated but this was relieved and then began having loose mushy stools.  She did notice some bright red blood as well which she has had with prior recurrences of these symptoms.  She notes nausea.  States she vomited 5 times over night but now just dry heaving.  Denies fevers but states she felt cold and sweaty.  Denies cough, congestion or recent illness.  Denies chest pain, SOB.  Notes intermittent dizziness which she describes as a lightheadedness.  Denies any urinary complaints such as dysuria, frequency, urgency, hematuria.  She has been sipping water today.  She notes she was supposed to see GI for follow up today but called to cancel and she was told to come to ER for possible dehydration.  No reported aggravating or alleviating factors.  No specific treatments tried.  No recent travel.  No sick contacts.  No recent antibiotics.  Denies suspicious food intake.  She notes yesterday she did eat some left chicken casserole for dinner and also had chocolate chip cookies and milk.  Spouse also had same dinner and without symptoms.  She reports this is similar to her prior flares - was seen here June 2023 and May 2022 for the same.  She notes symptoms similar as before.  She takes prilosec for her stomach.  She has known " hemorrhoid.  Denies any rectal pain.  She does not take aspirin or any blood thinners.  She did follow up with GI and had EGD and colonoscopy last 2023.  She had a single polyp removed from stomach and colon.  Prior abdominal surgeries include hysterectomy-oophorectomy and C section x2.      History provided by:  Medical records and patient   used: No    Abdominal Pain  Pain location:  Generalized  Pain quality: bloating    Pain radiates to:  Does not radiate  Duration:  1 day  Progression:  Unchanged  Chronicity:  Recurrent  Context: retching    Context: not eating, not recent illness, not recent travel, not sick contacts, not suspicious food intake and not trauma    Relieved by:  Nothing  Worsened by:  Nothing  Ineffective treatments:  None tried  Associated symptoms: chills, diarrhea, nausea and vomiting    Associated symptoms: no chest pain, no constipation, no cough, no dysuria, no fatigue, no fever, no hematuria, no shortness of breath and no sore throat    Risk factors: being elderly    Risk factors: no recent hospitalization        Prior to Admission Medications   Prescriptions Last Dose Informant Patient Reported? Taking?   Calcium Citrate-Vitamin D (CALCIUM CITRATE + D PO)  Self Yes No   Sig: Take by mouth   Omega-3 Fatty Acids (FISH OIL PO)  Self Yes No   Sig: Take 2 g by mouth   SM VITAMIN D3 4000 units CAPS  Self Yes No   Sig: Take 2,000 Units by mouth daily    atorvastatin (LIPITOR) 10 mg tablet   No No   Sig: TAKE 1 TABLET BY MOUTH DAILY   brimonidine (ALPHAGAN P) 0.15 % ophthalmic solution   Yes No   Si drop 2 (two) times a day   carboxymethylcellulose 0.5 % SOLN   Yes No   Si drop 3 (three) times a day as needed for dry eyes   cycloSPORINE (RESTASIS) 0.05 % ophthalmic emulsion  Self Yes No   Sig: Apply 1 drop to eye   glucosamine-chondroitin 500-400 MG tablet  Self Yes No   Sig: Take 1 tablet by mouth   mometasone (NASONEX) 50 mcg/act nasal spray   No No   Si  sprays into each nostril daily   multivitamin (THERAGRAN) TABS  Self Yes No   Sig: Take 1 tablet by mouth      Facility-Administered Medications: None       Past Medical History:   Diagnosis Date    Achilles tendon tear     bilateral    Allergic rhinitis due to pollen     Anxiety     Colon polyp     Depression     Endometrial cancer (HCC)     History of rectal bleeding 2023    HPV (human papilloma virus) infection     Hyperlipidemia     Kidney stones     renal calculi    Mass of neck     Osteoporosis     UTI (urinary tract infection)        Past Surgical History:   Procedure Laterality Date     SECTION  ,    COLONOSCOPY      DXA PROCEDURE (HISTORICAL)      HYSTERECTOMY Bilateral 2014    salpino-ooph    OOPHORECTOMY Bilateral 2014    TENDON REPAIR      Archilles tendon repair-bilateral       Family History   Adopted: Yes   Problem Relation Age of Onset    No Known Problems Mother     No Known Problems Father     Breast cancer Daughter 36    No Known Problems Maternal Grandmother     No Known Problems Maternal Grandfather     No Known Problems Paternal Grandmother     No Known Problems Paternal Grandfather     Parkinsonism Other     Seizures Other     Breast cancer additional onset Neg Hx     BRCA2 Negative Neg Hx     BRCA2 Positive Neg Hx     BRCA1 Positive Neg Hx     BRCA1 Negative Neg Hx     BRCA 1/2 Neg Hx     Ovarian cancer Neg Hx     Endometrial cancer Neg Hx     Colon cancer Neg Hx      I have reviewed and agree with the history as documented.    E-Cigarette/Vaping    E-Cigarette Use Never User      E-Cigarette/Vaping Substances    Nicotine No     THC No     CBD No     Flavoring No     Other No     Unknown No      Social History     Tobacco Use    Smoking status: Never    Smokeless tobacco: Never   Vaping Use    Vaping status: Never Used   Substance Use Topics    Alcohol use: Yes     Alcohol/week: 2.0 standard drinks of alcohol     Types: 1 Glasses of  wine, 1 Cans of beer per week     Comment: social    Drug use: Never       Review of Systems   Constitutional:  Positive for chills and diaphoresis. Negative for fatigue and fever.   HENT: Negative.  Negative for congestion, rhinorrhea and sore throat.    Eyes: Negative.  Negative for visual disturbance.   Respiratory: Negative.  Negative for cough, shortness of breath and wheezing.    Cardiovascular: Negative.  Negative for chest pain, palpitations and leg swelling.   Gastrointestinal:  Positive for abdominal pain, blood in stool, diarrhea, nausea and vomiting. Negative for constipation.   Genitourinary: Negative.  Negative for dysuria, flank pain, frequency and hematuria.   Musculoskeletal: Negative.  Negative for back pain.   Skin: Negative.  Negative for rash.   Neurological: Negative.  Negative for dizziness, syncope, light-headedness and headaches.   Psychiatric/Behavioral: Negative.     All other systems reviewed and are negative.      Physical Exam  Physical Exam  Vitals and nursing note reviewed.   Constitutional:       General: She is awake. She is not in acute distress.     Appearance: She is well-developed. She is not toxic-appearing or diaphoretic.   HENT:      Head: Normocephalic and atraumatic.      Right Ear: Hearing and external ear normal.      Left Ear: Hearing and external ear normal.      Nose: Nose normal.      Mouth/Throat:      Mouth: Oropharynx is clear and moist and mucous membranes are normal. Mucous membranes are moist.      Pharynx: Oropharynx is clear. Uvula midline.   Eyes:      General: Lids are normal. No scleral icterus.     Extraocular Movements: EOM normal.      Conjunctiva/sclera: Conjunctivae normal.      Pupils: Pupils are equal, round, and reactive to light.   Neck:      Trachea: Trachea and phonation normal. No tracheal deviation.   Cardiovascular:      Rate and Rhythm: Regular rhythm. Tachycardia present.      Pulses: Normal pulses.      Heart sounds: Normal heart sounds, S1  normal and S2 normal. No murmur heard.  Pulmonary:      Effort: Pulmonary effort is normal. No tachypnea or respiratory distress.      Breath sounds: Normal breath sounds. No wheezing, rhonchi or rales.   Abdominal:      General: Bowel sounds are normal. There is no distension.      Palpations: Abdomen is soft.      Tenderness: There is no abdominal tenderness. There is no CVA tenderness, guarding or rebound.   Musculoskeletal:         General: No tenderness or edema. Normal range of motion.      Right lower leg: No edema.      Left lower leg: No edema.   Skin:     General: Skin is warm and dry.      Capillary Refill: Capillary refill takes less than 2 seconds.      Findings: No rash.   Neurological:      General: No focal deficit present.      Mental Status: She is alert and oriented to person, place, and time.      GCS: GCS eye subscore is 4. GCS verbal subscore is 5. GCS motor subscore is 6.      Cranial Nerves: No cranial nerve deficit.      Sensory: No sensory deficit.      Motor: No abnormal muscle tone.   Psychiatric:         Mood and Affect: Mood and affect and mood normal.         Speech: Speech normal.         Behavior: Behavior normal. Behavior is cooperative.         Vital Signs  ED Triage Vitals [08/23/24 0934]   Temperature Pulse Respirations Blood Pressure SpO2   97.9 °F (36.6 °C) (!) 111 18 (!) 178/83 97 %      Temp Source Heart Rate Source Patient Position - Orthostatic VS BP Location FiO2 (%)   Tympanic Monitor Lying Left arm --      Pain Score       4           Vitals:    08/23/24 0934 08/23/24 1000 08/23/24 1030   BP: (!) 178/83 167/77 166/82   Pulse: (!) 111 98 91   Patient Position - Orthostatic VS: Lying Sitting          Visual Acuity      ED Medications  Medications   ondansetron (ZOFRAN) injection 4 mg (4 mg Intravenous Given 8/23/24 0953)   multi-electrolyte (ISOLYTE-S PH 7.4) bolus 1,000 mL (0 mL Intravenous Stopped 8/23/24 1047)   iohexol (OMNIPAQUE) 350 MG/ML injection (MULTI-DOSE) 100  mL (100 mL Intravenous Given 8/23/24 1037)       Diagnostic Studies  Results Reviewed       Procedure Component Value Units Date/Time    RBC Morphology Reflex Test [[id number]] Collected: 08/23/24 0946    Lab Status: Final result Specimen: Blood from Arm, Right Updated: 08/23/24 1101    UA w Reflex to Microscopic w Reflex to Culture [[id number]]  (Abnormal) Collected: 08/23/24 1048    Lab Status: Final result Specimen: Urine, Clean Catch Updated: 08/23/24 1055     Color, UA Colorless     Clarity, UA Clear     Specific Gravity, UA <1.005     pH, UA 7.0     Leukocytes, UA Negative     Nitrite, UA Negative     Protein, UA Negative mg/dl      Glucose, UA Negative mg/dl      Ketones, UA Negative mg/dl      Urobilinogen, UA <2.0 mg/dl      Bilirubin, UA Negative     Occult Blood, UA Negative    CBC and differential [[id number]]  (Abnormal) Collected: 08/23/24 0946    Lab Status: Final result Specimen: Blood from Arm, Right Updated: 08/23/24 1008     WBC 12.52 Thousand/uL      RBC 5.75 Million/uL      Hemoglobin 15.8 g/dL      Hematocrit 48.9 %      MCV 85 fL      MCH 27.5 pg      MCHC 32.3 g/dL      RDW 13.2 %      MPV 8.9 fL      Platelets 321 Thousands/uL     Narrative:      This is an appended report.  These results have been appended to a previously verified report.    Comprehensive metabolic panel [[id number]]  (Abnormal) Collected: 08/23/24 0946    Lab Status: Final result Specimen: Blood from Arm, Right Updated: 08/23/24 1008     Sodium 137 mmol/L      Potassium 4.1 mmol/L      Chloride 99 mmol/L      CO2 27 mmol/L      ANION GAP 11 mmol/L      BUN 16 mg/dL      Creatinine 0.74 mg/dL      Glucose 139 mg/dL      Calcium 9.6 mg/dL      AST 20 U/L      ALT 23 U/L      Alkaline Phosphatase 112 U/L      Total Protein 7.4 g/dL      Albumin 4.6 g/dL      Total Bilirubin 1.16 mg/dL      eGFR 80 ml/min/1.73sq m     Narrative:      National Kid[employer] Disease Foundation guidelines for Chronic Kidney Disease (CKD):     Stage 1 with  normal or high GFR (GFR > 90 mL/min/1.73 square meters)    Stage 2 Mild CKD (GFR = 60-89 mL/min/1.73 square meters)    Stage 3A Moderate CKD (GFR = 45-59 mL/min/1.73 square meters)    Stage 3B Moderate CKD (GFR = 30-44 mL/min/1.73 square meters)    Stage 4 Severe CKD (GFR = 15-29 mL/min/1.73 square meters)    Stage 5 End Stage CKD (GFR <15 mL/min/1.73 square meters)  Note: GFR calculation is accurate only with a steady state creatinine    Lipase [[id number]]  (Normal) Collected: 08/23/24 0946    Lab Status: Final result Specimen: Blood from Arm, Right Updated: 08/23/24 1008     Lipase 15 u/L     Lactic acid, plasma (w/reflex if result > 2.0) [[id number]]  (Abnormal) Collected: 08/23/24 0946    Lab Status: Final result Specimen: Blood from Arm, Right Updated: 08/23/24 1008     LACTIC ACID 2.7 mmol/L     Narrative:      Result may be elevated if tourniquet was used during collection.    Manual Differential(PHLEBS Do Not Order) [[id number]]  (Abnormal) Collected: 08/23/24 0946    Lab Status: Final result Specimen: Blood from Arm, Right Updated: 08/23/24 1008     Segmented % 90 %      Bands % 1 %      Lymphocytes % 8 %      Monocytes % 1 %      Eosinophils % 0 %      Basophils % 0 %      Absolute Neutrophils 11.39 Thousand/uL      Absolute Lymphocytes 1.00 Thousand/uL      Absolute Monocytes 0.13 Thousand/uL      Absolute Eosinophils 0.00 Thousand/uL      Absolute Basophils 0.00 Thousand/uL      Total Counted --     RBC Morphology Present     Platelet Estimate Adequate     Ovalocytes Present    Lactic acid 2 Hours [[id number]]     Lab Status: No result Specimen: Blood     Protime-INR [[id number]]  (Normal) Collected: 08/23/24 0946    Lab Status: Final result Specimen: Blood from Arm, Right Updated: 08/23/24 1005     Protime 13.0 seconds      INR 0.93    Narrative:      INR Therapeutic Range    Indication                                             INR Range      Atrial Fibrillation                                                2.0-3.0  Hypercoagulable State                                    2.0.2.3  Left Ventricular Asist Device                            2.0-3.0  Mechanical Heart Valve                                  -    Aortic(with afib, MI, embolism, HF, LA enlargement,    and/or coagulopathy)                                     2.0-3.0 (2.5-3.5)     Mitral                                                             2.5-3.5  Prosthetic/Bioprosthetic Heart Valve               2.0-3.0  Venous thromboembolism (VTE: VT, PE        2.0-3.0    APTT [[id number]]  (Normal) Collected: 08/23/24 0946    Lab Status: Final result Specimen: Blood from Arm, Right Updated: 08/23/24 1005     PTT 34 seconds                    CT abdomen pelvis with contrast   Final Result by Reji Hernandez MD (08/23 1106)         1. Diffuse colonic wall thickening suggesting an infectious or inflammatory colitis.   2. Shelbie mesentery is likely reactive and due to the colonic process discussed above.   3. Nonobstructive renal calculi bilaterally         Workstation performed: [id number]                    Procedures  Procedures         ED Course  ED Course as of 08/23/24 1217   Fri Aug 23, 2024   0953 WBC(!): 12.52  elevated   0953 Hemoglobin(!): 15.8  Elevated, appears concentrated   0954 Platelet Count: 321   1008 POCT INR: 0.93   1008 PROTIME: 13.0   1008 PTT: 34   1013 GLUCOSE: 139   1013 Creatinine: 0.74  0.63-0.67 over the past year   1013 BUN: 16   1013 Sodium: 137   1013 Potassium: 4.1   1013 Chloride: 99   1013 Carbon Dioxide: 27   1013 ANION GAP: 11   1013 Calcium: 9.6   1013 AST: 20   1013 ALT: 23   1013 ALK PHOS(!): 112  Mildly elevated, previously normal   1013 Total Protein: 7.4   1013 Albumin: 4.6   1013 Total Bilirubin(!): 1.16   1013 GFR, Calculated: 80   1013 LIPASE: 15   1013 LACTIC ACID(!): 2.7  Elevated; likely due to vomiting, fluids in process   1044 Pt ambulated to bathroom, steady gait.   1100 UA unremarkable; UA not suggestive of  infection   1111 CT abdomen pelvis with contrast  IMPRESSION:        1. Diffuse colonic wall thickening suggesting an infectious or inflammatory colitis.  2. Shelbie mesentery is likely reactive and due to the colonic process discussed above.  3. Nonobstructive renal calculi bilaterally   1121 Epic secure chat to Tho GROVER to discuss further.     1130 Reviewed with GI; advised symptomatic management and can increase omeprazole 20 mg to 40 mg.  Recommends liquid carafate TID (with meals, at bedtime).  Pt does have follow up appt scheduled with him on 9/5/24.   1133 Pt updated on results of CT scan as well as conversation with GI.  No vomiting or diarrhea while here.  Feeling improved after zofran, fluids.         Prior records reviewed.  Had ER visits on 5/12/22 and 6/5/23 for similar symptoms.  Was seen on 6/23/23 for GI consult.  Did have follow up EGD and colonoscopy on 7/14/23 which were reviewed.  Had a sessile polyp in stomach and sessile polyp in colon.  Colon biopsies were unremarkable.  A follow up scope advised in 7 years.  CT scan from May 2022 visit showed colitis.  CT scan from Jun 2023 visit was unremarkable.                        SBIRT 22yo+      Flowsheet Row Most Recent Value   Initial Alcohol Screen: US AUDIT-C     1. How often do you have a drink containing alcohol? 0 Filed at: 08/23/2024 0934   2. How many drinks containing alcohol do you have on a typical day you are drinking?  0 Filed at: 08/23/2024 0934   3a. Male UNDER 65: How often do you have five or more drinks on one occasion? 0 Filed at: 08/23/2024 0934   3b. FEMALE Any Age, or MALE 65+: How often do you have 4 or more drinks on one occassion? 0 Filed at: 08/23/2024 0934   Audit-C Score 0 Filed at: 08/23/2024 0934   ALTAGRACIA: How many times in the past year have you...    Used an illegal drug or used a prescription medication for non-medical reasons? Never Filed at: 08/23/2024 0934                      Medical Decision Making  74 yo  female presenting for evaluation of abdominal pain, nausea/vomiting/diarrhea.  Prior records reviewed.  Has had similar presentations in the past with similar symptoms.  Will check labs, urine and obtain CT abd/pelv to further evaluate.  Pt mildly tachycardic, vitals otherwise stable, will provide fluids and continue to monitor.  Ddx includes colitis, diverticulitis, IBS, gastroenteritis, foodborne illness, rectal bleeding; less like upper GI bleeding.    Work up obtained as noted above.  There is a mild leukocytosis which could be reactive, no anemia.  Elevated lactic which I suspect is from vomiting/dehydration.  No hypo or hyperglycemia.  Renal function and LFTs within normal limits. Electrolytes within normal limits.  UA not suggestive of infection.  CT abd/pelv shows diffuse colonic wall thickening which likely represents colitis which could be infectious vs inflammatory.  Symptomatically pt felt improved.  HR improved with fluids.  Pt afebrile, hemodynamically stable.  No reproducible tenderness on exam.  No vomiting or diarrhea while here.  Given this is similar to her prior presentations, I suspect this could be an inflammatory process.  Prior colon biopsies were negative.  Infection felt less likely.  I did review case with her GI provider who advised symptomatic management and can see in follow up in the office.  Would consider stool studies if persistent diarrhea. Pt comfortable with discharge and outpatient follow up plan of care.    Reviewed symptomatic management.  Advised clear liquid diet and advance to BRAT as tolerated.  Increased pt's prilosec per GI's recommendations (20 mg-->40 mg) and add carafate.  Will also provide zofran, bentyl for symptoms.  OTC meds reviewed.  Anticipatory guidance.  Advised recheck with PCP and GI or return to ER as needed.  Strict return precautions outlined.  Patient and spouse voiced understanding and had no further questions.    Please refer to above ER course for  further details/discussion.    Problems Addressed:  Abdominal pain: acute illness or injury  Colitis: acute illness or injury  Nausea, vomiting and diarrhea: acute illness or injury    Amount and/or Complexity of Data Reviewed  External Data Reviewed: labs, radiology and notes.  Labs: ordered. Decision-making details documented in ED Course.  Radiology: ordered and independent interpretation performed. Decision-making details documented in ED Course.    Risk  OTC drugs.  Prescription drug management.                 Disposition  Final diagnoses:   Abdominal pain   Colitis   Nausea, vomiting and diarrhea     Time reflects when diagnosis was documented in both MDM as applicable and the Disposition within this note       Time User Action Codes Description Comment    8/23/2024 11:36 AM Yomaira Carlos Add [R10.9] Abdominal pain     8/23/2024 11:37 AM Yomaira Carlos Add [K52.9] Colitis     8/23/2024 11:37 AM Yomaira Carlos Add [R11.2,  R19.7] Nausea, vomiting and diarrhea           ED Disposition       ED Disposition   Discharge    Condition   Stable    Date/Time   Fri Aug 23, 2024 1136    Comment   Lucy Mars discharge to home/self care.                   Follow-up Information       Follow up With Specialties Details Why Contact Info Additional Information    [facility], Nurse Practitioner Go to  appointment as scheduled [address] [place] [id number] [phone]       [facility] Emergency Medicine  As needed [address] [facility] [address]  [phone] [facility], [address], [place], [address]            Discharge Medication List as of 8/23/2024 11:44 AM        START taking these medications    Details   dicyclomine (BENTYL) 10 mg capsule Take 1 capsule (10 mg total) by mouth 4 (four) times a day (before meals and at bedtime), Starting Fri 8/23/2024,  Normal      omeprazole (PriLOSEC) 40 MG capsule Take 1 capsule (40 mg total) by mouth daily, Starting Fri 8/23/2024, Normal      ondansetron (ZOFRAN-ODT) 4 mg disintegrating tablet Take 1 tablet (4 mg total) by mouth every 6 (six) hours as needed for vomiting or nausea, Starting Fri 8/23/2024, Normal      sucralfate (CARAFATE) 1 g/10 mL suspension Take 10 mL (1 g total) by mouth 4 (four) times a day (with meals and at bedtime), Starting Fri 8/23/2024, Normal           CONTINUE these medications which have NOT CHANGED    Details   atorvastatin (LIPITOR) 10 mg tablet TAKE 1 TABLET BY MOUTH DAILY, Starting Mon 6/17/2024, Normal      brimonidine (ALPHAGAN P) 0.15 % ophthalmic solution 1 drop 2 (two) times a day, Historical Med      Calcium Citrate-Vitamin D (CALCIUM CITRATE + D PO) Take by mouth, Historical Med      carboxymethylcellulose 0.5 % SOLN 1 drop 3 (three) times a day as needed for dry eyes, Historical Med      cycloSPORINE (RESTASIS) 0.05 % ophthalmic emulsion Apply 1 drop to eye, Historical Med      glucosamine-chondroitin 500-400 MG tablet Take 1 tablet by mouth, Historical Med      mometasone (NASONEX) 50 mcg/act nasal spray 2 sprays into each nostril daily, Starting Thu 2/15/2024, Normal      multivitamin (THERAGRAN) TABS Take 1 tablet by mouth, Historical Med      Omega-3 Fatty Acids (FISH OIL PO) Take 2 g by mouth, Historical Med      SM VITAMIN D3 4000 units CAPS Take 2,000 Units by mouth daily , Historical Med             No discharge procedures on file.    PDMP Review       None            ED Provider  Electronically Signed by             Yomaira Carlos PA-C  08/23/24 4525

## 2024-08-23 NOTE — DISCHARGE INSTRUCTIONS
Rest, plenty of fluids. Clear liquids and advance diet as tolerated.  Increase your omeprazole from 20 mg to 40 mg daily (either once a day or twice a day).  Zofran as needed for nausea/vomiting.  Trial the carafate for your stomach.  Can use bentyl as needed for abdominal pain/cramping.  Follow up with your GI specialist or return to ER as needed.  Return to ER if worsening pain, fever, persistent vomiting, inability to eat/drink or any other concerns.

## 2024-08-23 NOTE — TELEPHONE ENCOUNTER
"Regarding: nausea, vomiting, diarrhea, rectal bleeding  ----- Message from Jennifer BRAVO sent at 8/23/2024  8:26 AM EDT -----  Pt called to rescheduled her appt for today because she states she is having a major flare up. Pt states she has nausea, vomiting, diarrhea. She states her stool is now \"mushy blood\" and she is having a lot of pain. She also states she may have had fever, but she is not sure. She would like to know if anything can be called into the pharmacy for her.    "

## 2024-09-05 ENCOUNTER — OFFICE VISIT (OUTPATIENT)
Dept: GASTROENTEROLOGY | Facility: CLINIC | Age: 74
End: 2024-09-05
Payer: MEDICARE

## 2024-09-05 VITALS
DIASTOLIC BLOOD PRESSURE: 79 MMHG | TEMPERATURE: 97.6 F | HEART RATE: 93 BPM | SYSTOLIC BLOOD PRESSURE: 123 MMHG | OXYGEN SATURATION: 97 %

## 2024-09-05 DIAGNOSIS — K52.9 COLITIS: ICD-10-CM

## 2024-09-05 DIAGNOSIS — R19.7 NAUSEA, VOMITING AND DIARRHEA: ICD-10-CM

## 2024-09-05 DIAGNOSIS — R11.2 NAUSEA, VOMITING AND DIARRHEA: ICD-10-CM

## 2024-09-05 DIAGNOSIS — R10.9 ABDOMINAL PAIN: Primary | ICD-10-CM

## 2024-09-05 PROCEDURE — 99214 OFFICE O/P EST MOD 30 MIN: CPT | Performed by: NURSE PRACTITIONER

## 2024-09-05 PROCEDURE — G2211 COMPLEX E/M VISIT ADD ON: HCPCS | Performed by: NURSE PRACTITIONER

## 2024-09-05 RX ORDER — OMEPRAZOLE 40 MG/1
40 CAPSULE, DELAYED RELEASE ORAL DAILY
Qty: 30 CAPSULE | Refills: 3 | Status: SHIPPED | OUTPATIENT
Start: 2024-09-05

## 2024-09-05 RX ORDER — POLYETHYLENE GLYCOL 3350 17 G/17G
238 POWDER, FOR SOLUTION ORAL ONCE
Qty: 238 G | Refills: 0 | Status: SHIPPED | OUTPATIENT
Start: 2024-09-05 | End: 2024-09-13

## 2024-09-05 RX ORDER — SUCRALFATE ORAL 1 G/10ML
1 SUSPENSION ORAL
Qty: 473 ML | Refills: 2 | Status: SHIPPED | OUTPATIENT
Start: 2024-09-05

## 2024-09-05 NOTE — PROGRESS NOTES
[facility] - Outpatient Follow-up Note  Lucy Mars 73 y.o. female MRN: [id number]  Encounter: [id number]          ASSESSMENT AND PLAN:    The patient presents today for follow-up office visit regarding her abdominal pain, nausea, vomiting, diarrhea, and CT evidence of colitis.     Exam: Abdomen is soft, nondistended, nontender, with hypoactive bowel sounds x 4. No edema noted of the b/l lower extremities upon exam today. Skin is non-icteric.      1. Abdominal pain  -     sucralfate (CARAFATE) 1 g/10 mL suspension; Take 10 mL (1 g total) by mouth 4 (four) times a day (with meals and at bedtime)  -     omeprazole (PriLOSEC) 40 MG capsule; Take 1 capsule (40 mg total) by mouth daily  -     Colonoscopy; Future; Expected date: 09/05/2024  -     polyethylene glycol (MiraLax) 17 GM/SCOOP powder; Take 238 g by mouth once for 1 dose Take 238 g my mouth. Use as directed  2. Nausea, vomiting and diarrhea  -     sucralfate (CARAFATE) 1 g/10 mL suspension; Take 10 mL (1 g total) by mouth 4 (four) times a day (with meals and at bedtime)  -     omeprazole (PriLOSEC) 40 MG capsule; Take 1 capsule (40 mg total) by mouth daily  -     Colonoscopy; Future; Expected date: 09/05/2024  -     polyethylene glycol (MiraLax) 17 GM/SCOOP powder; Take 238 g by mouth once for 1 dose Take 238 g my mouth. Use as directed  3. Colitis  Assessment & Plan:  While the patient was in the office today, I discussed with the patient that even though she has had a colonoscopy a little over a year ago, at this point with a significant flareup of colitis, without any other identifiable source, I feel it is necessary to proceed with a repeat colonoscopy in 6 to 8 weeks to evaluate any other underlying causes of her colitis.  The patient was agreeable and verbalized an understanding.    I obtained informed verbal consent from the patient. The risks/benefits/alternatives of the procedure were discussed with the  patient. Risks included, but not limited to, infection, bleeding, perforation, injury to organs in the abdomen, missed lesion, and incomplete procedure were discussed. The patient gave verbal understanding and is agreeable to proceed. Bowel prep instructions were reviewed and given as ordered. Patient's questions were answered to the best of my ability and until they verbalized an understanding.      In the meantime:  Continue bland diet and advance slowly as tolerated.  Continue omeprazole 40 mg daily.  Continue Carafate as prescribed.  Stop dicyclomine as I feel it may be causing constipation and since she is not having abdominal pain or spasms I feel it is unneeded.  Start MiraLAX daily as needed to help encourage bowel movements at least every other day if not daily.  Continue to drink at least 64 ounces of water daily.  Continue to watch for red flag symptoms.    Red flag symptoms were reviewed with the patient and her  while in the office today.  Orders:  -     sucralfate (CARAFATE) 1 g/10 mL suspension; Take 10 mL (1 g total) by mouth 4 (four) times a day (with meals and at bedtime)  -     omeprazole (PriLOSEC) 40 MG capsule; Take 1 capsule (40 mg total) by mouth daily  -     Colonoscopy; Future; Expected date: 09/05/2024  -     polyethylene glycol (MiraLax) 17 GM/SCOOP powder; Take 238 g by mouth once for 1 dose Take 238 g my mouth. Use as directed        The patient will schedule a follow up office visit after her Colonoscopy, but understands to call or contact our office if there are any issues or concerns in the mean time.  ______________________________________________________________________    SUBJECTIVE: Patient is a 73 y.o. female who was previously seen in our office on 9/7/23 and most recently in the ER on August 23, 2024 for abdominal pain, nausea, vomiting, and diarrhea and presents today for follow-up office visit regarding her abdominal pain, nausea, vomiting, diarrhea, and CT evidence  of colitis.  The patient reports that besides this most recent episode she only had 1 other colitis flareup in the year since she had been seen, however, this flareup was pretty significant and was accompanied by a significant flareup of her chronic left lower extremity Achilles tendinitis.  The patient reports that she went to the ER because of the abdominal pain that was in the left upper to left lower quadrants, nausea, vomiting, and diarrhea symptoms.  She reports that since the ER she has increased the omeprazole to 40 mg a day, finish the dicyclomine as it was prescribed, and continues with the Carafate 4 times daily.  She reports that the only thing she can think of that could have triggered this was that prior to the incident she did have 2 weeks where she had family at her house visiting and it was very stressful as her and her 1 daughter were having some issues.      The patient reports that at this point her symptoms have improved, but has only been eating a bland diet of rice, chicken, vegetables, soft foods, etc. she does report that she has been a little bit constipated as typically she has a bowel movement every day, however, at this point she has not had a bowel movement in 4 days.    The patient currently denies any reflux, nausea, dysphagia, vomiting, decreased appetite, unplanned weight loss, or abdominal pain. Water Intake: Adequate amounts per day.    The patient currently reports that they have a BM typically daily and reports that it is usually relieving, without any consistent diarrhea, nocturnal BMs, constipation, straining, melena or bloody stools. Last BM: 4 days. Flatus: Yes.    Meds: Resolved 40 mg daily and Carafate 4 times daily.  Daily NSAID Use: Denied.    Imaging: (8/23/24): CT of the abdomen and pelvis with contrast:  Diffuse colonic wall thickening suggesting an infectious or inflammatory colitis.  Shelbie mesentery is likely reactive and due to the colonic process discussed  above.    Endoscopy History: EGD: (23): Gastric polyp. Path: Normal.       COLONOSCOPY: (23): 1 Tubular Adenoma. Repeat colonoscopy in 7 years.       DUE: 29    REVIEW OF SYSTEMS IS OTHERWISE NEGATIVE.      Historical Information   Past Medical History:   Diagnosis Date    Achilles tendon tear ,    bilateral    Allergic rhinitis due to pollen     Anxiety     Colon polyp     Depression     Endometrial cancer (HCC)     History of rectal bleeding 2023    HPV (human papilloma virus) infection     Hyperlipidemia     Kidney stones     renal calculi    Mass of neck     Osteoporosis     UTI (urinary tract infection)      Past Surgical History:   Procedure Laterality Date     SECTION  ,    COLONOSCOPY      DXA PROCEDURE (HISTORICAL)      HYSTERECTOMY Bilateral     salpino-ooph    OOPHORECTOMY Bilateral 2014    TENDON REPAIR  ,    Archilles tendon repair-bilateral     Social History   Social History     Substance and Sexual Activity   Alcohol Use Yes    Alcohol/week: 2.0 standard drinks of alcohol    Types: 1 Glasses of wine, 1 Cans of beer per week    Comment: social     Social History     Substance and Sexual Activity   Drug Use Never     Social History     Tobacco Use   Smoking Status Never   Smokeless Tobacco Never     Family History   Adopted: Yes   Problem Relation Age of Onset    No Known Problems Mother     No Known Problems Father     Breast cancer Daughter 36    No Known Problems Maternal Grandmother     No Known Problems Maternal Grandfather     No Known Problems Paternal Grandmother     No Known Problems Paternal Grandfather     Parkinsonism Other     Seizures Other     Breast cancer additional onset Neg Hx     BRCA2 Negative Neg Hx     BRCA2 Positive Neg Hx     BRCA1 Positive Neg Hx     BRCA1 Negative Neg Hx     BRCA 1/2 Neg Hx     Ovarian cancer Neg Hx     Endometrial cancer Neg Hx     Colon cancer Neg Hx        Meds/Allergies       Current  Outpatient Medications:     atorvastatin (LIPITOR) 10 mg tablet    brimonidine (ALPHAGAN P) 0.15 % ophthalmic solution    Calcium Citrate-Vitamin D (CALCIUM CITRATE + D PO)    carboxymethylcellulose 0.5 % SOLN    cycloSPORINE (RESTASIS) 0.05 % ophthalmic emulsion    dicyclomine (BENTYL) 10 mg capsule    glucosamine-chondroitin 500-400 MG tablet    mometasone (NASONEX) 50 mcg/act nasal spray    multivitamin (THERAGRAN) TABS    Omega-3 Fatty Acids (FISH OIL PO)    omeprazole (PriLOSEC) 40 MG capsule    ondansetron (ZOFRAN-ODT) 4 mg disintegrating tablet    SM VITAMIN D3 4000 units CAPS    sucralfate (CARAFATE) 1 g/10 mL suspension    Allergies   Allergen Reactions    Cefaclor Other (See Comments)     swelling and itching of hands and feet    Cephalosporins Itching    Prednisone Other (See Comments)     disconnected feeling           Objective     Blood pressure 123/79, pulse 93, temperature 97.6 °F (36.4 °C), temperature source Temporal, SpO2 97%. There is no height or weight on file to calculate BMI.      PHYSICAL EXAM:      General Appearance:   Alert, cooperative, no distress   HEENT:   Normocephalic, atraumatic, anicteric.     Neck:  Supple, symmetrical, trachea midline   Lungs:   Clear to auscultation bilaterally; no rales, rhonchi or wheezing; respirations unlabored    Heart::   Regular rate and rhythm; no murmur, rub, or gallop.   Abdomen:   Soft, non-tender, non-distended; normal bowel sounds; no masses, no organomegaly    Genitalia:   Deferred    Rectal:   Deferred    Extremities:  No cyanosis, clubbing or edema    Pulses:  2+ and symmetric    Skin:  No jaundice, rashes, or lesions    Lymph nodes:  No palpable cervical lymphadenopathy        Lab Results:   No visits with results within 1 Day(s) from this visit.   Latest known visit with results is:   Admission on 08/23/2024, Discharged on 08/23/2024   Component Date Value    WBC 08/23/2024 12.52 (H)     RBC 08/23/2024 5.75 (H)     Hemoglobin 08/23/2024 15.8  (H)     Hematocrit 08/23/2024 48.9 (H)     MCV 08/23/2024 85     MCH 08/23/2024 27.5     MCHC 08/23/2024 32.3     RDW 08/23/2024 13.2     MPV 08/23/2024 8.9     Platelets 08/23/2024 321     Sodium 08/23/2024 137     Potassium 08/23/2024 4.1     Chloride 08/23/2024 99     CO2 08/23/2024 27     ANION GAP 08/23/2024 11     BUN 08/23/2024 16     Creatinine 08/23/2024 0.74     Glucose 08/23/2024 139     Calcium 08/23/2024 9.6     AST 08/23/2024 20     ALT 08/23/2024 23     Alkaline Phosphatase 08/23/2024 112 (H)     Total Protein 08/23/2024 7.4     Albumin 08/23/2024 4.6     Total Bilirubin 08/23/2024 1.16 (H)     eGFR 08/23/2024 80     Protime 08/23/2024 13.0     INR 08/23/2024 0.93     PTT 08/23/2024 34     Lipase 08/23/2024 15     LACTIC ACID 08/23/2024 2.7 (H)     Color, UA 08/23/2024 Colorless     Clarity, UA 08/23/2024 Clear     Specific Gravity, UA 08/23/2024 <1.005 (L)     pH, UA 08/23/2024 7.0     Leukocytes, UA 08/23/2024 Negative     Nitrite, UA 08/23/2024 Negative     Protein, UA 08/23/2024 Negative     Glucose, UA 08/23/2024 Negative     Ketones, UA 08/23/2024 Negative     Urobilinogen, UA 08/23/2024 <2.0     Bilirubin, UA 08/23/2024 Negative     Occult Blood, UA 08/23/2024 Negative     Segmented % 08/23/2024 90 (H)     Bands % 08/23/2024 1     Lymphocytes % 08/23/2024 8 (L)     Monocytes % 08/23/2024 1 (L)     Eosinophils % 08/23/2024 0     Basophils % 08/23/2024 0     Absolute Neutrophils 08/23/2024 11.39 (H)     Absolute Lymphocytes 08/23/2024 1.00     Absolute Monocytes 08/23/2024 0.13     Absolute Eosinophils 08/23/2024 0.00     Absolute Basophils 08/23/2024 0.00     RBC Morphology 08/23/2024 Present     Platelet Estimate 08/23/2024 Adequate     Ovalocytes 08/23/2024 Present          Radiology Results:   XR foot 3+ vw left    Result Date: 8/28/2024  Narrative: This result has an attachment that is not available. Impression: 3 views left foot -  AP, Lateral, and Oblique:  No acute fracture or  dislocation. Posterior and inferior calcaneal osteophytes. Moderate midfoot osteoarthritis. Soft tissue swelling posterior to calcaneus.     CT abdomen pelvis with contrast    Result Date: 8/23/2024  Narrative: CT ABDOMEN AND PELVIS WITH IV CONTRAST INDICATION: abd pain, vomiting. COMPARISON: June 5, 2023. TECHNIQUE: CT examination of the abdomen and pelvis was performed. Multiplanar 2D reformatted images were created from the source data. This examination, like all CT scans performed in the [facility], was performed utilizing techniques to minimize radiation dose exposure, including the use of iterative reconstruction and automated exposure control. Radiation dose length product (DLP) for this visit: 455 mGy-cm IV Contrast: 100 mL of iohexol (OMNIPAQUE) Enteric Contrast: Not administered. FINDINGS: ABDOMEN LOWER CHEST: No clinically significant abnormality in the visualized lower chest. LIVER/BILIARY TREE: Simple hepatic cyst(s). No suspicious mass. Normal hepatic contours. No biliary dilation. GALLBLADDER: No calcified gallstones. No pericholecystic inflammatory change. SPLEEN: Unremarkable. PANCREAS: Unremarkable. ADRENAL GLANDS: Unremarkable. KIDNEYS/URETERS: There are nonobstructive renal calculi bilaterally. No ureteral calculus seen. No hydronephrosis. STOMACH AND BOWEL: There is circumferential bowel wall thickening beginning in the proximal transverse colon and extending to the rectosigmoid junction.. There is mild surrounding inflammatory change. A evan appearance of the mesentery is likely reactive. No small bowel wall thickening or dilation seen. APPENDIX: No findings to suggest appendicitis. ABDOMINOPELVIC CAVITY: No ascites. No pneumoperitoneum. No lymphadenopathy. VESSELS: Unremarkable for patient's age. PELVIS REPRODUCTIVE ORGANS: Post hysterectomy. URINARY BLADDER: Unremarkable. ABDOMINAL WALL/INGUINAL REGIONS: Unremarkable. BONES: No acute fracture or suspicious osseous lesion.      Impression: 1. Diffuse colonic wall thickening suggesting an infectious or inflammatory colitis. 2. Shelbie mesentery is likely reactive and due to the colonic process discussed above. 3. Nonobstructive renal calculi bilaterally Workstation performed: [id number]    Answers submitted by the patient for this visit:  Abdominal Pain Questionnaire (Submitted on 8/29/2024)  Chief Complaint: Abdominal pain  Chronicity: recurrent  Onset: more than 1 year ago  Onset quality: sudden  Frequency: rarely  Episode duration: 1 Hours  Radiates to: does not radiate  anorexia: No  arthralgias: No  belching: No  constipation: No  dysuria: No  fever: No  flatus: Yes  frequency: No  headaches: No  hematuria: No  melena: No  myalgias: Yes  nausea: No  weight loss: No  Aggravated by: nothing  Relieved by: bowel movements    Conflicting answers have been found for some questions. Please document the patient's answers manually.

## 2024-09-05 NOTE — PATIENT INSTRUCTIONS
Schedule a colonoscopy in 8 weeks.   Continue bland diet and advance slowly as tolerated.   Continue Omeprazole 40 mg daily and Carafate as prescribed.   Continue to try to avoid trigger foods.   Stop Dicyclomine.  Start Miralax daily as needed.   Continue to watch for red flag symptoms.   Schedule a f/u after colonoscopy.     We did review GI red flag symptoms, including, but not limited to: chronic nausea, vomiting, diarrhea, chills, fever, and unintentional weight loss and should call or contact our office with any changes or concerns. I reviewed with the patient that if they notice any blood while vomiting or in their stool they should contact or office or go to the nearest emergency room for immediate evaluation. The patient was agreeable and verbalized an understanding.

## 2024-09-05 NOTE — ASSESSMENT & PLAN NOTE
While the patient was in the office today, I discussed with the patient that even though she has had a colonoscopy a little over a year ago, at this point with a significant flareup of colitis, without any other identifiable source, I feel it is necessary to proceed with a repeat colonoscopy in 6 to 8 weeks to evaluate any other underlying causes of her colitis.  The patient was agreeable and verbalized an understanding.    I obtained informed verbal consent from the patient. The risks/benefits/alternatives of the procedure were discussed with the patient. Risks included, but not limited to, infection, bleeding, perforation, injury to organs in the abdomen, missed lesion, and incomplete procedure were discussed. The patient gave verbal understanding and is agreeable to proceed. Bowel prep instructions were reviewed and given as ordered. Patient's questions were answered to the best of my ability and until they verbalized an understanding.      In the meantime:  Continue bland diet and advance slowly as tolerated.  Continue omeprazole 40 mg daily.  Continue Carafate as prescribed.  Stop dicyclomine as I feel it may be causing constipation and since she is not having abdominal pain or spasms I feel it is unneeded.  Start MiraLAX daily as needed to help encourage bowel movements at least every other day if not daily.  Continue to drink at least 64 ounces of water daily.  Continue to watch for red flag symptoms.    Red flag symptoms were reviewed with the patient and her  while in the office today.

## 2024-09-07 ENCOUNTER — OFFICE VISIT (OUTPATIENT)
Dept: OBGYN CLINIC | Facility: CLINIC | Age: 74
End: 2024-09-07
Payer: MEDICARE

## 2024-09-07 ENCOUNTER — APPOINTMENT (OUTPATIENT)
Dept: RADIOLOGY | Facility: CLINIC | Age: 74
End: 2024-09-07
Payer: MEDICARE

## 2024-09-07 VITALS
WEIGHT: 157 LBS | HEART RATE: 98 BPM | BODY MASS INDEX: 27.82 KG/M2 | HEIGHT: 63 IN | DIASTOLIC BLOOD PRESSURE: 80 MMHG | TEMPERATURE: 99.2 F | SYSTOLIC BLOOD PRESSURE: 144 MMHG

## 2024-09-07 DIAGNOSIS — M25.462 EFFUSION OF LEFT KNEE: ICD-10-CM

## 2024-09-07 DIAGNOSIS — M25.562 CHRONIC PAIN OF LEFT KNEE: Primary | ICD-10-CM

## 2024-09-07 DIAGNOSIS — G89.29 CHRONIC PAIN OF LEFT KNEE: ICD-10-CM

## 2024-09-07 DIAGNOSIS — M25.562 CHRONIC PAIN OF LEFT KNEE: ICD-10-CM

## 2024-09-07 DIAGNOSIS — G89.29 CHRONIC PAIN OF LEFT KNEE: Primary | ICD-10-CM

## 2024-09-07 LAB
APPEARANCE FLD: ABNORMAL
COLOR FLD: YELLOW
CRYSTALS SNV QL MICRO: NORMAL
LYMPHOCYTES # SNV MANUAL: 3 %
MONOCYTES NFR SNV MANUAL: 29 %
NEUTROPHILS NFR SNV MANUAL: 68 %
SITE: ABNORMAL
TOTAL CELLS COUNTED SPEC: 100
WBC # FLD MANUAL: 5176 /UL (ref 0–200)

## 2024-09-07 PROCEDURE — 99204 OFFICE O/P NEW MOD 45 MIN: CPT | Performed by: FAMILY MEDICINE

## 2024-09-07 PROCEDURE — 73562 X-RAY EXAM OF KNEE 3: CPT

## 2024-09-07 PROCEDURE — 89060 EXAM SYNOVIAL FLUID CRYSTALS: CPT | Performed by: FAMILY MEDICINE

## 2024-09-07 PROCEDURE — 87205 SMEAR GRAM STAIN: CPT | Performed by: FAMILY MEDICINE

## 2024-09-07 PROCEDURE — 89051 BODY FLUID CELL COUNT: CPT | Performed by: FAMILY MEDICINE

## 2024-09-07 PROCEDURE — 87070 CULTURE OTHR SPECIMN AEROBIC: CPT | Performed by: FAMILY MEDICINE

## 2024-09-07 PROCEDURE — 20610 DRAIN/INJ JOINT/BURSA W/O US: CPT | Performed by: FAMILY MEDICINE

## 2024-09-07 RX ORDER — TRIAMCINOLONE ACETONIDE 40 MG/ML
40 INJECTION, SUSPENSION INTRA-ARTICULAR; INTRAMUSCULAR
Status: COMPLETED | OUTPATIENT
Start: 2024-09-07 | End: 2024-09-07

## 2024-09-07 RX ORDER — ROPIVACAINE HYDROCHLORIDE 5 MG/ML
3 INJECTION, SOLUTION EPIDURAL; INFILTRATION; PERINEURAL
Status: COMPLETED | OUTPATIENT
Start: 2024-09-07 | End: 2024-09-07

## 2024-09-07 RX ADMIN — ROPIVACAINE HYDROCHLORIDE 3 ML: 5 INJECTION, SOLUTION EPIDURAL; INFILTRATION; PERINEURAL at 09:00

## 2024-09-07 RX ADMIN — TRIAMCINOLONE ACETONIDE 40 MG: 40 INJECTION, SUSPENSION INTRA-ARTICULAR; INTRAMUSCULAR at 09:00

## 2024-09-07 NOTE — PROGRESS NOTES
[facility] [address] [id number] [phone]      Assessment:  1. Chronic pain of left knee  -     XR knee 3 vw left non injury; Future; Expected date: 09/07/2024  -     Large joint arthrocentesis: L knee  -     Synovial fluid, crystal; Future  -     Synovial fluid white cell count w/ diff; Future  -     Body fluid culture and Gram stain; Future  -     ropivacaine (NAROPIN) 0.5 % injection 3 mL  -     triamcinolone acetonide (KENALOG-40) 40 mg/mL injection 40 mg  2. Effusion of left knee  -     Large joint arthrocentesis: L knee  -     Synovial fluid, crystal; Future  -     Synovial fluid white cell count w/ diff; Future  -     Body fluid culture and Gram stain; Future  -     ropivacaine (NAROPIN) 0.5 % injection 3 mL  -     triamcinolone acetonide (KENALOG-40) 40 mg/mL injection 40 mg      Plan:  Patient Instructions   F/u 1 wk  Labs  L knee steroid/aspiration  Icing/heat/OTC pain meds as needed.     Return in about 1 week (around 9/14/2024) for Recheck.    Chief Complaint:  Chief Complaint   Patient presents with    Left Knee - Pain       Subjective:   HPI    Patient ID: Lucy Mars is a 73 y.o. female     Here c/o L  knee pain  2 wks ago stepped out of bed and felt pain in her heel and couldn't put weight on her L leg  Saw foot dr- given boot- started having L knee pain  Hurts to walk  Ibuprofen PRN- bothers her stomach  Tylenol PRN- not much help  Voltaren gel- not much help.  Swelling  Feels it is going to give out  Using wheelchair.    Review of Systems   Constitutional:  Positive for chills. Negative for fatigue and fever.   Respiratory:  Negative for shortness of breath.    Cardiovascular:  Negative for chest pain.   Gastrointestinal:  Negative for abdominal pain and nausea.   Genitourinary:  Negative for dysuria.   Musculoskeletal:  Positive for arthralgias.   Skin:  Negative for rash and wound.   Neurological:   "Negative for weakness and headaches.       Objective:  Vitals:  /80 (BP Location: Left arm, Patient Position: Sitting, Cuff Size: Standard)   Pulse 98   Temp 99.2 °F (37.3 °C) (Tympanic)   Ht 5' 2.5\" (1.588 m) Comment: verbal from patient in wheelchair  Wt 71.2 kg (157 lb) Comment: verbal from patient in wheelchair  LMP  (LMP Unknown)   BMI 28.26 kg/m²     The following portions of the patient's history were reviewed and updated as appropriate: allergies, current medications, past family history, past medical history, past social history, past surgical history, and problem list.    Physical exam:  Physical Exam  Constitutional:       Appearance: Normal appearance. She is normal weight.   Eyes:      Extraocular Movements: Extraocular movements intact.   Pulmonary:      Effort: Pulmonary effort is normal.   Musculoskeletal:      Cervical back: Normal range of motion.      Left knee: Effusion present.      Instability Tests: Medial Ghada test positive and lateral Ghada test positive.   Skin:     General: Skin is warm and dry.   Neurological:      General: No focal deficit present.      Mental Status: She is alert and oriented to person, place, and time. Mental status is at baseline.   Psychiatric:         Mood and Affect: Mood normal.         Behavior: Behavior normal.         Thought Content: Thought content normal.         Judgment: Judgment normal.       Left Knee Exam     Tenderness   The patient is experiencing tenderness in the medial joint line.    Range of Motion   Extension:  normal   Flexion:  abnormal     Tests   Ghada:  Medial - positive Lateral - positive    Other   Swelling: moderate  Effusion: effusion present          Observations   Left Knee   Positive for effusion.       I have personally reviewed pertinent films in PACS and my interpretation is XR  L knee- medial joint line narrowing. Mod OA, chondrocalcinosis..    Large joint arthrocentesis: L knee  Universal " "Protocol:  procedure performed by consultantConsent: Verbal consent obtained.  Risks and benefits: risks, benefits and alternatives were discussed  Consent given by: patient  Time out: Immediately prior to procedure a \"time out\" was called to verify the correct patient, procedure, equipment, support staff and site/side marked as required.  Timeout called at: 9/7/2024 9:33 AM.  Site marked: the operative site was marked  Supporting Documentation  Indications: pain   Procedure Details  Location: knee - L knee  Preparation: Patient was prepped and draped in the usual sterile fashion  Needle size: 18 G  Ultrasound guidance: no  Approach: anterolateral  Medications administered: 40 mg triamcinolone acetonide 40 mg/mL; 3 mL ropivacaine 0.5 %    Aspirate amount: 45 mL  Aspirate: yellow and cloudy  Analysis: fluid sample sent for laboratory analysis  Patient tolerance: patient tolerated the procedure well with no immediate complications  Dressing:  Sterile dressing applied            Arsalan Puentes MD   "

## 2024-09-08 LAB
BACTERIA SPEC BFLD CULT: NO GROWTH
GRAM STN SPEC: NORMAL

## 2024-09-09 ENCOUNTER — OFFICE VISIT (OUTPATIENT)
Dept: FAMILY MEDICINE CLINIC | Facility: CLINIC | Age: 74
End: 2024-09-09
Payer: MEDICARE

## 2024-09-09 VITALS
BODY MASS INDEX: 27.25 KG/M2 | DIASTOLIC BLOOD PRESSURE: 64 MMHG | HEIGHT: 63 IN | WEIGHT: 153.8 LBS | HEART RATE: 79 BPM | OXYGEN SATURATION: 98 % | SYSTOLIC BLOOD PRESSURE: 126 MMHG

## 2024-09-09 DIAGNOSIS — E78.5 HYPERLIPIDEMIA, UNSPECIFIED HYPERLIPIDEMIA TYPE: ICD-10-CM

## 2024-09-09 DIAGNOSIS — N20.0 NEPHROLITHIASIS: ICD-10-CM

## 2024-09-09 DIAGNOSIS — K52.9 COLITIS: ICD-10-CM

## 2024-09-09 DIAGNOSIS — E55.9 VITAMIN D DEFICIENCY: ICD-10-CM

## 2024-09-09 DIAGNOSIS — Z78.0 POSTMENOPAUSAL: ICD-10-CM

## 2024-09-09 DIAGNOSIS — Z00.00 MEDICARE ANNUAL WELLNESS VISIT, SUBSEQUENT: Primary | ICD-10-CM

## 2024-09-09 PROCEDURE — G0439 PPPS, SUBSEQ VISIT: HCPCS | Performed by: PHYSICIAN ASSISTANT

## 2024-09-09 PROCEDURE — 99214 OFFICE O/P EST MOD 30 MIN: CPT | Performed by: PHYSICIAN ASSISTANT

## 2024-09-09 NOTE — ASSESSMENT & PLAN NOTE
Patient is scheduled for colonoscopy.  Recommended that she continue bland diet and advance slowly as tolerated.  Continue omeprazole and Carafate.  Continue to follow with GI

## 2024-09-09 NOTE — PROGRESS NOTES
Ambulatory Visit  Name: Lucy Mars      : 1950      MRN: [id number]  Encounter Provider: Lizbeth Lemons PA-C  Encounter Date: 2024   Encounter department: [facility]    Assessment & Plan   1. Medicare annual wellness visit, subsequent  Assessment & Plan:  Routine labs ordered  DEXA scan ordered  Mammogram is already scheduled  Colonoscopy scheduled  Up-to-date with GYN visits  Up-to-date with eye exams and dental cleanings  Orders:  -     Comprehensive metabolic panel; Future  -     TSH, 3rd generation with Free T4 reflex; Future  -     CBC and differential; Future  -     Lipid panel; Future  -     Vitamin D 25 hydroxy; Future  2. Vitamin D deficiency  Assessment & Plan:  Labs ordered to evaluate.  Continue vitamin D supplementation.  Orders:  -     Vitamin D 25 hydroxy; Future  3. Hyperlipidemia, unspecified hyperlipidemia type  Assessment & Plan:  Routine labs ordered.  Recommended that she continue atorvastatin 10 mg daily.  Continue low-fat diet.  Orders:  -     Comprehensive metabolic panel; Future  -     Lipid panel; Future  4. Postmenopausal  -     DXA bone density spine hip and pelvis; Future; Expected date: 2024  5. Nephrolithiasis  Assessment & Plan:  No recent kidney stones.  Continue to follow with urology.  6. Colitis  Assessment & Plan:  Patient is scheduled for colonoscopy.  Recommended that she continue bland diet and advance slowly as tolerated.  Continue omeprazole and Carafate.  Continue to follow with GI       Preventive health issues were discussed with patient, and age appropriate screening tests were ordered as noted in patient's After Visit Summary. Personalized health advice and appropriate referrals for health education or preventive services given if needed, as noted in patient's After Visit Summary.    History of Present Illness     Lucy is a very pleasant 73-year-old female who is here today for her Medicare annual wellness visit.  She continues to  follow with urology for kidney stones.  She has not had any recent episodes.  She was recently seen in the emergency department for colitis.  She did follow-up with gastroenterology.  She is scheduled for a colonoscopy.  She is tolerating all of her medications without difficulty.  She is due for routine labs.  She is scheduled to follow-up for her yearly GYN visit.  She is up-to-date with eye exams and dental cleanings.  Mammogram is scheduled.  She is due for her DEXA scan.  She is also following with orthopedics for a left knee effusion.  She received an injection 2 days ago, which has significantly improved her symptoms.       Patient Care Team:  Lizbeth Lemons PA-C as PCP - General (Family Medicine)  LENORE Anaya as Nurse Practitioner (Gastroenterology)    Review of Systems   Constitutional:  Negative for chills, diaphoresis, fatigue and fever.   HENT:  Negative for congestion, ear pain, postnasal drip, rhinorrhea, sneezing, sore throat and trouble swallowing.    Eyes:  Negative for pain and visual disturbance.   Respiratory:  Negative for apnea, cough, shortness of breath and wheezing.    Cardiovascular:  Negative for chest pain and palpitations.   Gastrointestinal:  Negative for abdominal pain, constipation, diarrhea, nausea and vomiting.   Genitourinary:  Negative for dysuria and hematuria.   Musculoskeletal:  Positive for arthralgias (left knee pain) and gait problem. Negative for myalgias.   Neurological:  Negative for dizziness, syncope, weakness, light-headedness, numbness and headaches.   Psychiatric/Behavioral:  Negative for suicidal ideas. The patient is not nervous/anxious.      Medical History Reviewed by provider this encounter:  Tobacco  Allergies  Meds  Problems  Med Hx  Surg Hx  Fam Hx       Annual Wellness Visit Questionnaire   Lucy is here for her Subsequent Wellness visit.     Health Risk Assessment:   Patient rates overall health as very good. Patient feels that their physical  health rating is slightly worse. Patient is very satisfied with their life. Eyesight was rated as same. Hearing was rated as same. Patient feels that their emotional and mental health rating is same. Patients states they are never, rarely angry. Patient states they are never, rarely unusually tired/fatigued. Pain experienced in the last 7 days has been a lot. Patient's pain rating has been 7/10. Patient states that she has experienced weight loss or gain in last 6 months.     Depression Screening:   PHQ-2 Score: 0      Fall Risk Screening:   In the past year, patient has experienced: no history of falling in past year      Urinary Incontinence Screening:   Patient has leaked urine accidently in the last six months.     Home Safety:  Patient does not have trouble with stairs inside or outside of their home. Patient has working smoke alarms and has working carbon monoxide detector. Home safety hazards include: none.     Nutrition:   Current diet is Regular.     Medications:   Patient is currently taking over-the-counter supplements. OTC medications include: see medication list. Patient is able to manage medications.     Activities of Daily Living (ADLs)/Instrumental Activities of Daily Living (IADLs):   Walk and transfer into and out of bed and chair?: Yes  Dress and groom yourself?: Yes    Bathe or shower yourself?: Yes    Feed yourself? Yes  Do your laundry/housekeeping?: Yes  Manage your money, pay your bills and track your expenses?: Yes  Make your own meals?: Yes    Do your own shopping?: Yes    Previous Hospitalizations:   Any hospitalizations or ED visits within the last 12 months?: Yes    How many hospitalizations have you had in the last year?: 1-2    Advance Care Planning:   Living will: Yes    Durable POA for healthcare: Yes    Advanced directive: Yes      Cognitive Screening:   Provider or family/friend/caregiver concerned regarding cognition?: No    PREVENTIVE SCREENINGS      Cardiovascular Screening:     General: Screening Not Indicated, History Lipid Disorder and Risks and Benefits Discussed      Diabetes Screening:     General: Screening Current and Risks and Benefits Discussed      Colorectal Cancer Screening:     General: Screening Current      Breast Cancer Screening:     General: Screening Current and Risks and Benefits Discussed      Cervical Cancer Screening:    General: Risks and Benefits Discussed and Screening Current      Osteoporosis Screening:    General: Risks and Benefits Discussed      Abdominal Aortic Aneurysm (AAA) Screening:        General: Risks and Benefits Discussed      Lung Cancer Screening:     General: Screening Not Indicated and Risks and Benefits Discussed      Hepatitis C Screening:    General: Screening Current and Risks and Benefits Discussed    Screening, Brief Intervention, and Referral to Treatment (SBIRT)    Screening  Typical number of drinks in a day: 0  Typical number of drinks in a week: 0  Interpretation: Low risk drinking behavior.    AUDIT-C Screenin) How often did you have a drink containing alcohol in the past year? monthly or less  2) How many drinks did you have on a typical day when you were drinking in the past year? 1 to 2  3) How often did you have 6 or more drinks on one occasion in the past year? never    AUDIT-C Score: 1  Interpretation: Score 0-2 (female): Negative screen for alcohol misuse    Single Item Drug Screening:  How often have you used an illegal drug (including marijuana) or a prescription medication for non-medical reasons in the past year? never    Single Item Drug Screen Score: 0  Interpretation: Negative screen for possible drug use disorder    Other Counseling Topics:   Car/seat belt/driving safety and regular weightbearing exercise.     Social Determinants of Health     Financial Resource Strain: Low Risk  (2023)    Overall Financial Resource Strain (CARDIA)    • Difficulty of Paying Living Expenses: Not hard at all   Food Insecurity:  "No Food Insecurity (9/4/2024)    Hunger Vital Sign    • Worried About Running Out of Food in the Last Year: Never true    • Ran Out of Food in the Last Year: Never true   Transportation Needs: No Transportation Needs (9/4/2024)    PRAPARE - Transportation    • Lack of Transportation (Medical): No    • Lack of Transportation (Non-Medical): No   Housing Stability: Low Risk  (9/4/2024)    Housing Stability Vital Sign    • Unable to Pay for Housing in the Last Year: No    • Number of Times Moved in the Last Year: 0    • Homeless in the Last Year: No   Utilities: Not At Risk (9/4/2024)    [facility] Utilities    • Threatened with loss of utilities: No     No results found.    Objective     /64   Pulse 79   Ht 5' 2.5\" (1.588 m)   Wt 69.8 kg (153 lb 12.8 oz)   LMP  (LMP Unknown)   SpO2 98%   BMI 27.68 kg/m²     Physical Exam  Vitals and nursing note reviewed.   Constitutional:       General: She is not in acute distress.     Appearance: She is well-developed. She is not diaphoretic.   HENT:      Head: Normocephalic and atraumatic.      Right Ear: Hearing, tympanic membrane, ear canal and external ear normal.      Left Ear: Hearing, tympanic membrane, ear canal and external ear normal.      Nose: Nose normal. No mucosal edema or rhinorrhea.      Mouth/Throat:      Pharynx: No oropharyngeal exudate or posterior oropharyngeal erythema.   Eyes:      Extraocular Movements: Extraocular movements intact.   Cardiovascular:      Rate and Rhythm: Normal rate and regular rhythm.      Heart sounds: Normal heart sounds. No murmur heard.     No friction rub. No gallop.   Pulmonary:      Effort: Pulmonary effort is normal. No respiratory distress.      Breath sounds: Normal breath sounds. No wheezing or rales.   Abdominal:      General: There is no distension.      Palpations: Abdomen is soft.   Musculoskeletal:         General: Normal range of motion.      Cervical back: Normal range of motion and neck supple.      Left knee: " Swelling and effusion present.   Lymphadenopathy:      Cervical: No cervical adenopathy.   Skin:     General: Skin is warm and dry.      Findings: No rash.   Neurological:      Mental Status: She is alert and oriented to person, place, and time.   Psychiatric:         Behavior: Behavior normal.         Thought Content: Thought content normal.         Judgment: Judgment normal.

## 2024-09-09 NOTE — ASSESSMENT & PLAN NOTE
Routine labs ordered.  Recommended that she continue atorvastatin 10 mg daily.  Continue low-fat diet.

## 2024-09-09 NOTE — PATIENT INSTRUCTIONS
Medicare Preventive Visit Patient Instructions  Thank you for completing your Welcome to Medicare Visit or Medicare Annual Wellness Visit today. Your next wellness visit will be due in one year (9/10/2025).  The screening/preventive services that you may require over the next 5-10 years are detailed below. Some tests may not apply to you based off risk factors and/or age. Screening tests ordered at today's visit but not completed yet may show as past due. Also, please note that scanned in results may not display below.  Preventive Screenings:  Service Recommendations Previous Testing/Comments   Colorectal Cancer Screening  * Colonoscopy    * Fecal Occult Blood Test (FOBT)/Fecal Immunochemical Test (FIT)  * Fecal DNA/Cologuard Test  * Flexible Sigmoidoscopy Age: 45-75 years old   Colonoscopy: every 10 years (may be performed more frequently if at higher risk)  OR  FOBT/FIT: every 1 year  OR  Cologuard: every 3 years  OR  Sigmoidoscopy: every 5 years  Screening may be recommended earlier than age 45 if at higher risk for colorectal cancer. Also, an individualized decision between you and your healthcare provider will decide whether screening between the ages of 76-85 would be appropriate. Colonoscopy: 07/14/2023  FOBT/FIT: Not on file  Cologuard: Not on file  Sigmoidoscopy: Not on file    Screening Current     Breast Cancer Screening Age: 40+ years old  Frequency: every 1-2 years  Not required if history of left and right mastectomy Mammogram: 11/08/2023    Screening Current   Cervical Cancer Screening Between the ages of 21-29, pap smear recommended once every 3 years.   Between the ages of 30-65, can perform pap smear with HPV co-testing every 5 years.   Recommendations may differ for women with a history of total hysterectomy, cervical cancer, or abnormal pap smears in past. Pap Smear: 10/18/2022    Screening Not Indicated   Hepatitis C Screening Once for adults born between 1945 and 1965  More frequently in  patients at high risk for Hepatitis C Hep C Antibody: 08/31/2022    Screening Current   Diabetes Screening 1-2 times per year if you're at risk for diabetes or have pre-diabetes Fasting glucose: 100 mg/dL (2/15/2024)  A1C: No results in last 5 years (No results in last 5 years)  Screening Current   Cholesterol Screening Once every 5 years if you don't have a lipid disorder. May order more often based on risk factors. Lipid panel: 02/15/2024    Screening Not Indicated  History Lipid Disorder     Other Preventive Screenings Covered by Medicare:  Abdominal Aortic Aneurysm (AAA) Screening: covered once if your at risk. You're considered to be at risk if you have a family history of AAA.  Lung Cancer Screening: covers low dose CT scan once per year if you meet all of the following conditions: (1) Age 55-77; (2) No signs or symptoms of lung cancer; (3) Current smoker or have quit smoking within the last 15 years; (4) You have a tobacco smoking history of at least 20 pack years (packs per day multiplied by number of years you smoked); (5) You get a written order from a healthcare provider.  Glaucoma Screening: covered annually if you're considered high risk: (1) You have diabetes OR (2) Family history of glaucoma OR (3)  aged 50 and older OR (4)  American aged 65 and older  Osteoporosis Screening: covered every 2 years if you meet one of the following conditions: (1) You're estrogen deficient and at risk for osteoporosis based off medical history and other findings; (2) Have a vertebral abnormality; (3) On glucocorticoid therapy for more than 3 months; (4) Have primary hyperparathyroidism; (5) On osteoporosis medications and need to assess response to drug therapy.   Last bone density test (DXA Scan): 11/04/2022.  HIV Screening: covered annually if you're between the age of 15-65. Also covered annually if you are younger than 15 and older than 65 with risk factors for HIV infection. For pregnant  patients, it is covered up to 3 times per pregnancy.    Immunizations:  Immunization Recommendations   Influenza Vaccine Annual influenza vaccination during flu season is recommended for all persons aged >= 6 months who do not have contraindications   Pneumococcal Vaccine   * Pneumococcal conjugate vaccine = PCV13 (Prevnar 13), PCV15 (Vaxneuvance), PCV20 (Prevnar 20)  * Pneumococcal polysaccharide vaccine = PPSV23 (Pneumovax) Adults 19-65 yo with certain risk factors or if 65+ yo  If never received any pneumonia vaccine: recommend Prevnar 20 (PCV20)  Give PCV20 if previously received 1 dose of PCV13 or PPSV23   Hepatitis B Vaccine 3 dose series if at intermediate or high risk (ex: diabetes, end stage renal disease, liver disease)   Respiratory syncytial virus (RSV) Vaccine - COVERED BY MEDICARE PART D  * RSVPreF3 (Arexvy) CDC recommends that adults 60 years of age and older may receive a single dose of RSV vaccine using shared clinical decision-making (SCDM)   Tetanus (Td) Vaccine - COST NOT COVERED BY MEDICARE PART B Following completion of primary series, a booster dose should be given every 10 years to maintain immunity against tetanus. Td may also be given as tetanus wound prophylaxis.   Tdap Vaccine - COST NOT COVERED BY MEDICARE PART B Recommended at least once for all adults. For pregnant patients, recommended with each pregnancy.   Shingles Vaccine (Shingrix) - COST NOT COVERED BY MEDICARE PART B  2 shot series recommended in those 19 years and older who have or will have weakened immune systems or those 50 years and older     Health Maintenance Due:      Topic Date Due   • Breast Cancer Screening: Mammogram  11/08/2024   • Colorectal Cancer Screening  07/12/2030   • Hepatitis C Screening  Completed     Immunizations Due:      Topic Date Due   • Influenza Vaccine (1) 09/01/2024     Advance Directives   What are advance directives?  Advance directives are legal documents that state your wishes and plans for  medical care. These plans are made ahead of time in case you lose your ability to make decisions for yourself. Advance directives can apply to any medical decision, such as the treatments you want, and if you want to donate organs.   What are the types of advance directives?  There are many types of advance directives, and each state has rules about how to use them. You may choose a combination of any of the following:  Living will:  This is a written record of the treatment you want. You can also choose which treatments you do not want, which to limit, and which to stop at a certain time. This includes surgery, medicine, IV fluid, and tube feedings.   Durable power of  for healthcare (DPAHC):  This is a written record that states who you want to make healthcare choices for you when you are unable to make them for yourself. This person, called a proxy, is usually a family member or a friend. You may choose more than 1 proxy.  Do not resuscitate (DNR) order:  A DNR order is used in case your heart stops beating or you stop breathing. It is a request not to have certain forms of treatment, such as CPR. A DNR order may be included in other types of advance directives.  Medical directive:  This covers the care that you want if you are in a coma, near death, or unable to make decisions for yourself. You can list the treatments you want for each condition. Treatment may include pain medicine, surgery, blood transfusions, dialysis, IV or tube feedings, and a ventilator (breathing machine).  Values history:  This document has questions about your views, beliefs, and how you feel and think about life. This information can help others choose the care that you would choose.  Why are advance directives important?  An advance directive helps you control your care. Although spoken wishes may be used, it is better to have your wishes written down. Spoken wishes can be misunderstood, or not followed. Treatments may be given  even if you do not want them. An advance directive may make it easier for your family to make difficult choices about your care.   Urinary Incontinence   Urinary incontinence (UI)  is when you lose control of your bladder. UI develops because your bladder cannot store or empty urine properly. The 3 most common types of UI are stress incontinence, urge incontinence, or both.  Medicines:   May be given to help strengthen your bladder control. Report any side effects of medication to your healthcare provider.  Do pelvic muscle exercises often:  Your pelvic muscles help you stop urinating. Squeeze these muscles tight for 5 seconds, then relax for 5 seconds. Gradually work up to squeezing for 10 seconds. Do 3 sets of 15 repetitions a day, or as directed. This will help strengthen your pelvic muscles and improve bladder control.  Train your bladder:  Go to the bathroom at set times, such as every 2 hours, even if you do not feel the urge to go. You can also try to hold your urine when you feel the urge to go. For example, hold your urine for 5 minutes when you feel the urge to go. As that becomes easier, hold your urine for 10 minutes.   Self-care:   Keep a UI record.  Write down how often you leak urine and how much you leak. Make a note of what you were doing when you leaked urine.  Drink liquids as directed. You may need to limit the amount of liquid you drink to help control your urine leakage. Do not drink any liquid right before you go to bed. Limit or do not have drinks that contain caffeine or alcohol.   Prevent constipation.  Eat a variety of high-fiber foods. Good examples are high-fiber cereals, beans, vegetables, and whole-grain breads. Walking is the best way to trigger your intestines to have a bowel movement.  Exercise regularly and maintain a healthy weight.  Weight loss and exercise will decrease pressure on your bladder and help you control your leakage.   Use a catheter as directed  to help empty your  bladder. A catheter is a tiny, plastic tube that is put into your bladder to drain your urine.   Go to behavior therapy as directed.  Behavior therapy may be used to help you learn to control your urge to urinate.    Weight Management   Why it is important to manage your weight:  Being overweight increases your risk of health conditions such as heart disease, high blood pressure, type 2 diabetes, and certain types of cancer. It can also increase your risk for osteoarthritis, sleep apnea, and other respiratory problems. Aim for a slow, steady weight loss. Even a small amount of weight loss can lower your risk of health problems.  How to lose weight safely:  A safe and healthy way to lose weight is to eat fewer calories and get regular exercise. You can lose up about 1 pound a week by decreasing the number of calories you eat by 500 calories each day.   Healthy meal plan for weight management:  A healthy meal plan includes a variety of foods, contains fewer calories, and helps you stay healthy. A healthy meal plan includes the following:  Eat whole-grain foods more often.  A healthy meal plan should contain fiber. Fiber is the part of grains, fruits, and vegetables that is not broken down by your body. Whole-grain foods are healthy and provide extra fiber in your diet. Some examples of whole-grain foods are whole-wheat breads and pastas, oatmeal, brown rice, and bulgur.  Eat a variety of vegetables every day.  Include dark, leafy greens such as spinach, kale, damian greens, and mustard greens. Eat yellow and orange vegetables such as carrots, sweet potatoes, and winter squash.   Eat a variety of fruits every day.  Choose fresh or canned fruit (canned in its own juice or light syrup) instead of juice. Fruit juice has very little or no fiber.  Eat low-fat dairy foods.  Drink fat-free (skim) milk or 1% milk. Eat fat-free yogurt and low-fat cottage cheese. Try low-fat cheeses such as mozzarella and other reduced-fat  cheeses.  Choose meat and other protein foods that are low in fat.  Choose beans or other legumes such as split peas or lentils. Choose fish, skinless poultry (chicken or turkey), or lean cuts of red meat (beef or pork). Before you cook meat or poultry, cut off any visible fat.   Use less fat and oil.  Try baking foods instead of frying them. Add less fat, such as margarine, sour cream, regular salad dressing and mayonnaise to foods. Eat fewer high-fat foods. Some examples of high-fat foods include french fries, doughnuts, ice cream, and cakes.  Eat fewer sweets.  Limit foods and drinks that are high in sugar. This includes candy, cookies, regular soda, and sweetened drinks.  Exercise:  Exercise at least 30 minutes per day on most days of the week. Some examples of exercise include walking, biking, dancing, and swimming. You can also fit in more physical activity by taking the stairs instead of the elevator or parking farther away from stores. Ask your healthcare provider about the best exercise plan for you.      © Copyright [employer] 2018 Information is for End User's use only and may not be sold, redistributed or otherwise used for commercial purposes. All illustrations and images included in CareNotes® are the copyrighted property of [employer].D.A[employer]M., Inc. or everyArt

## 2024-09-09 NOTE — ASSESSMENT & PLAN NOTE
Routine labs ordered  DEXA scan ordered  Mammogram is already scheduled  Colonoscopy scheduled  Up-to-date with GYN visits  Up-to-date with eye exams and dental cleanings

## 2024-09-10 LAB
BACTERIA SPEC BFLD CULT: NO GROWTH
GRAM STN SPEC: NORMAL

## 2024-09-13 ENCOUNTER — OFFICE VISIT (OUTPATIENT)
Dept: OBGYN CLINIC | Facility: CLINIC | Age: 74
End: 2024-09-13
Payer: MEDICARE

## 2024-09-13 VITALS
SYSTOLIC BLOOD PRESSURE: 144 MMHG | BODY MASS INDEX: 26.9 KG/M2 | HEART RATE: 77 BPM | TEMPERATURE: 98.3 F | DIASTOLIC BLOOD PRESSURE: 83 MMHG | WEIGHT: 151.8 LBS | HEIGHT: 63 IN

## 2024-09-13 DIAGNOSIS — M25.562 CHRONIC PAIN OF LEFT KNEE: Primary | ICD-10-CM

## 2024-09-13 DIAGNOSIS — G89.29 CHRONIC PAIN OF LEFT KNEE: Primary | ICD-10-CM

## 2024-09-13 DIAGNOSIS — M25.462 EFFUSION OF LEFT KNEE: ICD-10-CM

## 2024-09-13 PROCEDURE — 99213 OFFICE O/P EST LOW 20 MIN: CPT | Performed by: FAMILY MEDICINE

## 2024-09-13 NOTE — PROGRESS NOTES
"[facility] [address] [address] [phone]      Assessment:  1. Chronic pain of left knee  2. Effusion of left knee      Plan:  Patient Instructions   F/u as needed  Home exercises  Icing/heat/OTC pain meds as needed.     Return if symptoms worsen or fail to improve.    Chief Complaint:  Chief Complaint   Patient presents with    Left Knee - Follow-up       Subjective:   HPI    Patient ID: Lucy Mars is a 73 y.o. female     Here for f/u  L  knee pain  Having no pain  No swelling  Feeling a little off balance  No pain meds needed.    Review of Systems   Constitutional:  Negative for fatigue and fever.   Respiratory:  Negative for shortness of breath.    Cardiovascular:  Negative for chest pain.   Gastrointestinal:  Negative for abdominal pain and nausea.   Genitourinary:  Negative for dysuria.   Musculoskeletal:  Negative for arthralgias.   Skin:  Negative for rash and wound.   Neurological:  Negative for weakness and headaches.       Objective:  Vitals:  /83 (BP Location: Right arm, Patient Position: Sitting, Cuff Size: Standard)   Pulse 77   Temp 98.3 °F (36.8 °C) (Tympanic)   Ht 5' 2.5\" (1.588 m)   Wt 68.9 kg (151 lb 12.8 oz)   LMP  (LMP Unknown)   BMI 27.32 kg/m²     The following portions of the patient's history were reviewed and updated as appropriate: allergies, current medications, past family history, past medical history, past social history, past surgical history, and problem list.    Physical exam:  Physical Exam  Constitutional:       Appearance: Normal appearance. She is normal weight.   HENT:      Head: Normocephalic.   Eyes:      Extraocular Movements: Extraocular movements intact.   Pulmonary:      Effort: Pulmonary effort is normal.   Musculoskeletal:      Cervical back: Normal range of motion.      Left knee: Effusion present.      Instability Tests: Medial Ghada test negative and lateral " Ghaad test negative.   Skin:     General: Skin is warm and dry.   Neurological:      General: No focal deficit present.      Mental Status: She is alert and oriented to person, place, and time. Mental status is at baseline.   Psychiatric:         Mood and Affect: Mood normal.         Behavior: Behavior normal.         Thought Content: Thought content normal.         Judgment: Judgment normal.       Left Knee Exam     Tenderness   The patient is experiencing tenderness in the medial joint line.    Range of Motion   The patient has normal left knee ROM.    Tests   Ghada:  Medial - negative Lateral - negative  Varus: negative Valgus: negative    Other   Swelling: mild  Effusion: effusion present          Observations   Left Knee   Positive for effusion.             Arsalan Puentes MD

## 2024-09-18 ENCOUNTER — LAB (OUTPATIENT)
Dept: LAB | Facility: MEDICAL CENTER | Age: 74
End: 2024-09-18
Payer: MEDICARE

## 2024-09-18 DIAGNOSIS — Z00.00 MEDICARE ANNUAL WELLNESS VISIT, SUBSEQUENT: ICD-10-CM

## 2024-09-18 DIAGNOSIS — E55.9 VITAMIN D DEFICIENCY: ICD-10-CM

## 2024-09-18 DIAGNOSIS — E78.5 HYPERLIPIDEMIA, UNSPECIFIED HYPERLIPIDEMIA TYPE: ICD-10-CM

## 2024-09-18 LAB
25(OH)D3 SERPL-MCNC: 38.3 NG/ML (ref 30–100)
ALBUMIN SERPL BCG-MCNC: 4.1 G/DL (ref 3.5–5)
ALP SERPL-CCNC: 93 U/L (ref 34–104)
ALT SERPL W P-5'-P-CCNC: 19 U/L (ref 7–52)
ANION GAP SERPL CALCULATED.3IONS-SCNC: 6 MMOL/L (ref 4–13)
AST SERPL W P-5'-P-CCNC: 11 U/L (ref 13–39)
BASOPHILS # BLD AUTO: 0.03 THOUSANDS/ΜL (ref 0–0.1)
BASOPHILS NFR BLD AUTO: 0 % (ref 0–1)
BILIRUB SERPL-MCNC: 0.75 MG/DL (ref 0.2–1)
BUN SERPL-MCNC: 20 MG/DL (ref 5–25)
CALCIUM SERPL-MCNC: 9 MG/DL (ref 8.4–10.2)
CHLORIDE SERPL-SCNC: 103 MMOL/L (ref 96–108)
CHOLEST SERPL-MCNC: 158 MG/DL
CO2 SERPL-SCNC: 31 MMOL/L (ref 21–32)
CREAT SERPL-MCNC: 0.69 MG/DL (ref 0.6–1.3)
EOSINOPHIL # BLD AUTO: 0.09 THOUSAND/ΜL (ref 0–0.61)
EOSINOPHIL NFR BLD AUTO: 1 % (ref 0–6)
ERYTHROCYTE [DISTWIDTH] IN BLOOD BY AUTOMATED COUNT: 13.6 % (ref 11.6–15.1)
GFR SERPL CREATININE-BSD FRML MDRD: 86 ML/MIN/1.73SQ M
GLUCOSE P FAST SERPL-MCNC: 93 MG/DL (ref 65–99)
HCT VFR BLD AUTO: 42.8 % (ref 34.8–46.1)
HDLC SERPL-MCNC: 62 MG/DL
HGB BLD-MCNC: 13.8 G/DL (ref 11.5–15.4)
IMM GRANULOCYTES # BLD AUTO: 0.02 THOUSAND/UL (ref 0–0.2)
IMM GRANULOCYTES NFR BLD AUTO: 0 % (ref 0–2)
LDLC SERPL CALC-MCNC: 82 MG/DL (ref 0–100)
LYMPHOCYTES # BLD AUTO: 2.54 THOUSANDS/ΜL (ref 0.6–4.47)
LYMPHOCYTES NFR BLD AUTO: 27 % (ref 14–44)
MCH RBC QN AUTO: 27.9 PG (ref 26.8–34.3)
MCHC RBC AUTO-ENTMCNC: 32.2 G/DL (ref 31.4–37.4)
MCV RBC AUTO: 87 FL (ref 82–98)
MONOCYTES # BLD AUTO: 0.66 THOUSAND/ΜL (ref 0.17–1.22)
MONOCYTES NFR BLD AUTO: 7 % (ref 4–12)
NEUTROPHILS # BLD AUTO: 6.07 THOUSANDS/ΜL (ref 1.85–7.62)
NEUTS SEG NFR BLD AUTO: 65 % (ref 43–75)
NONHDLC SERPL-MCNC: 96 MG/DL
NRBC BLD AUTO-RTO: 0 /100 WBCS
PLATELET # BLD AUTO: 387 THOUSANDS/UL (ref 149–390)
PMV BLD AUTO: 9.2 FL (ref 8.9–12.7)
POTASSIUM SERPL-SCNC: 4.1 MMOL/L (ref 3.5–5.3)
PROT SERPL-MCNC: 6.5 G/DL (ref 6.4–8.4)
RBC # BLD AUTO: 4.95 MILLION/UL (ref 3.81–5.12)
SODIUM SERPL-SCNC: 140 MMOL/L (ref 135–147)
TRIGL SERPL-MCNC: 70 MG/DL
TSH SERPL DL<=0.05 MIU/L-ACNC: 2.12 UIU/ML (ref 0.45–4.5)
WBC # BLD AUTO: 9.41 THOUSAND/UL (ref 4.31–10.16)

## 2024-09-18 PROCEDURE — 80053 COMPREHEN METABOLIC PANEL: CPT

## 2024-09-18 PROCEDURE — 36415 COLL VENOUS BLD VENIPUNCTURE: CPT

## 2024-09-18 PROCEDURE — 80061 LIPID PANEL: CPT

## 2024-09-18 PROCEDURE — 84443 ASSAY THYROID STIM HORMONE: CPT

## 2024-09-18 PROCEDURE — 82306 VITAMIN D 25 HYDROXY: CPT

## 2024-09-18 PROCEDURE — 85025 COMPLETE CBC W/AUTO DIFF WBC: CPT

## 2024-09-19 ENCOUNTER — TELEPHONE (OUTPATIENT)
Age: 74
End: 2024-09-19

## 2024-09-19 NOTE — TELEPHONE ENCOUNTER
This is not a normal side effect of the carafate, but lets have her decrease the Carafate down to 2 x daily for now and see if that makes a difference in her symptoms.

## 2024-09-19 NOTE — TELEPHONE ENCOUNTER
Spoke to Lucy with Tho's recommendation. She will decrease dosage to 2 x per day. She asked what 2x to take it and I recommended to take 12 hours apart.

## 2024-09-19 NOTE — TELEPHONE ENCOUNTER
"Patients GI provider:  LENORE Almanza    Number to return call: [phone]    Reason for call: Patient calling as she may be having a reaction/side effect to a medication, believes it is the Sucralfate. States that she has been feeling \"spacey\". Does not feel dizzy but states she feels \"off\". States she has never felt this way when taking a medication. Asking if this could be normal? Should she continue medication?    Scheduled procedure/appointment date if applicable: Apt 12/31/24    "

## 2024-10-05 PROBLEM — R11.2 NAUSEA, VOMITING AND DIARRHEA: Status: RESOLVED | Noted: 2023-06-23 | Resolved: 2024-10-05

## 2024-10-05 PROBLEM — R19.7 NAUSEA, VOMITING AND DIARRHEA: Status: RESOLVED | Noted: 2023-06-23 | Resolved: 2024-10-05

## 2024-10-09 PROBLEM — Z00.00 MEDICARE ANNUAL WELLNESS VISIT, SUBSEQUENT: Status: RESOLVED | Noted: 2024-09-09 | Resolved: 2024-10-09

## 2024-11-11 ENCOUNTER — HOSPITAL ENCOUNTER (OUTPATIENT)
Dept: MAMMOGRAPHY | Facility: HOSPITAL | Age: 74
Discharge: HOME | End: 2024-11-11
Payer: MEDICARE

## 2024-11-11 VITALS — BODY MASS INDEX: 26.75 KG/M2 | HEIGHT: 63 IN | WEIGHT: 151 LBS

## 2024-11-11 DIAGNOSIS — Z12.31 SCREENING MAMMOGRAM FOR BREAST CANCER: ICD-10-CM

## 2024-11-11 PROCEDURE — 77067 SCR MAMMO BI INCL CAD: CPT

## 2024-11-11 PROCEDURE — 77063 BREAST TOMOSYNTHESIS BI: CPT

## 2024-11-13 ENCOUNTER — HOSPITAL ENCOUNTER (OUTPATIENT)
Dept: BONE DENSITY | Facility: HOSPITAL | Age: 74
Discharge: HOME | End: 2024-11-13
Payer: MEDICARE

## 2024-11-13 ENCOUNTER — TELEPHONE (OUTPATIENT)
Dept: GASTROENTEROLOGY | Facility: CLINIC | Age: 74
End: 2024-11-13

## 2024-11-13 DIAGNOSIS — Z78.0 POSTMENOPAUSAL: ICD-10-CM

## 2024-11-13 PROCEDURE — 77080 DXA BONE DENSITY AXIAL: CPT

## 2024-11-13 NOTE — TELEPHONE ENCOUNTER
Patient is asking if they should continue or discontinue there sucralfate prior to colonoscopy on 11/18/24.    Please advise

## 2024-11-13 NOTE — TELEPHONE ENCOUNTER
I would say hold the dose before bed the night before as well as the dose in AM and can re-start after the procedure.

## 2024-11-16 ENCOUNTER — APPOINTMENT (EMERGENCY)
Dept: RADIOLOGY | Facility: HOSPITAL | Age: 74
End: 2024-11-16
Payer: MEDICARE

## 2024-11-16 ENCOUNTER — HOSPITAL ENCOUNTER (EMERGENCY)
Facility: HOSPITAL | Age: 74
Discharge: HOME | End: 2024-11-16
Attending: EMERGENCY MEDICINE
Payer: MEDICARE

## 2024-11-16 VITALS
HEART RATE: 97 BPM | SYSTOLIC BLOOD PRESSURE: 146 MMHG | OXYGEN SATURATION: 95 % | DIASTOLIC BLOOD PRESSURE: 69 MMHG | RESPIRATION RATE: 18 BRPM | TEMPERATURE: 97.9 F

## 2024-11-16 DIAGNOSIS — M25.562 LEFT KNEE PAIN: ICD-10-CM

## 2024-11-16 DIAGNOSIS — M25.462 KNEE EFFUSION, LEFT: Primary | ICD-10-CM

## 2024-11-16 PROCEDURE — 99283 EMERGENCY DEPT VISIT LOW MDM: CPT

## 2024-11-16 PROCEDURE — 73564 X-RAY EXAM KNEE 4 OR MORE: CPT

## 2024-11-16 PROCEDURE — 99284 EMERGENCY DEPT VISIT MOD MDM: CPT | Performed by: EMERGENCY MEDICINE

## 2024-11-16 PROCEDURE — 20610 DRAIN/INJ JOINT/BURSA W/O US: CPT | Performed by: EMERGENCY MEDICINE

## 2024-11-16 RX ORDER — ROPIVACAINE HYDROCHLORIDE 5 MG/ML
5 INJECTION, SOLUTION EPIDURAL; INFILTRATION; PERINEURAL ONCE
Status: COMPLETED | OUTPATIENT
Start: 2024-11-16 | End: 2024-11-16

## 2024-11-16 RX ADMIN — ROPIVACAINE HYDROCHLORIDE 5 ML: 5 INJECTION, SOLUTION EPIDURAL; INFILTRATION; PERINEURAL at 15:07

## 2024-11-16 NOTE — ED PROVIDER NOTES
Time reflects when diagnosis was documented in both MDM as applicable and the Disposition within this note       Time User Action Codes Description Comment    11/16/2024  3:34 PM Jona Mckee Add [M25.462] Knee effusion, left     11/16/2024  3:34 PM Marcpura Jona PONCE Add [M25.562] Left knee pain           ED Disposition       ED Disposition   Discharge    Condition   Stable    Date/Time   Sat Nov 16, 2024  3:34 PM    Comment   Lucy Mars discharge to home/self care.                   Assessment & Plan       Medical Decision Making  Patient is a pleasant 73 y/o female presenting with two days of worsening swelling in her left knee.     Problems Addressed:  Knee effusion, left: acute illness or injury  Left knee pain: acute illness or injury    Amount and/or Complexity of Data Reviewed  Radiology: ordered and independent interpretation performed. Decision-making details documented in ED Course.     Details: No acute fracture, dislocation. Tricompartmental arthritis.     Risk  OTC drugs.  Prescription drug management.  Risk Details: Pain and ROM improved after arthrocentesis performed. Compression bandage applied (ACE Wrap). F/u ortho, PCP. Return if worsens. Ice, elevate.              Medications   ropivacaine (NAROPIN) 0.5 % injection 5 mL (5 mL Infiltration Given by Other 11/16/24 1507)       ED Risk Strat Scores             I personally discussed return precautions with this patient and family. I provided the patient with written discharge instructions and particularly highlighted specific areas of interest to this patient, including but not limited to: medications for symptom managment, follow up recommendations, and return precautions. Patient and family are in agreement with this plan as outlined above.              SBIRT 20yo+      Flowsheet Row Most Recent Value   Initial Alcohol Screen: US AUDIT-C     1. How often do you have a drink containing alcohol? 0 Filed at: 11/16/2024 2754   2. How many  drinks containing alcohol do you have on a typical day you are drinking?  0 Filed at: 2024 1350   3a. Male UNDER 65: How often do you have five or more drinks on one occasion? 0 Filed at: 2024 1350   3b. FEMALE Any Age, or MALE 65+: How often do you have 4 or more drinks on one occassion? 0 Filed at: 2024 1350   Audit-C Score 0 Filed at: 2024 1350   ALTAGRACIA: How many times in the past year have you...    Used an illegal drug or used a prescription medication for non-medical reasons? Never Filed at: 2024 1350                            History of Present Illness       Chief Complaint   Patient presents with    Knee Swelling     Patient reports L knee swelling that started yesterday morning. H/O needing the L knee tapped.        Past Medical History:   Diagnosis Date    Achilles tendon tear ,    bilateral    Allergic rhinitis due to pollen     Anxiety     Colon polyp     Depression     Endometrial cancer (HCC)     History of rectal bleeding 2023    HPV (human papilloma virus) infection     Hyperlipidemia     Kidney stones     renal calculi    Mass of neck     Osteoporosis     UTI (urinary tract infection)       Past Surgical History:   Procedure Laterality Date     SECTION  ,    COLONOSCOPY      DXA PROCEDURE (HISTORICAL)      HYSTERECTOMY Bilateral 2014    salpino-ooph    OOPHORECTOMY Bilateral 2014    TENDON REPAIR  ,    Gerhard tendon repair-bilateral      Family History   Adopted: Yes   Problem Relation Age of Onset    No Known Problems Mother     No Known Problems Father     Breast cancer Daughter 36    No Known Problems Maternal Grandmother     No Known Problems Maternal Grandfather     No Known Problems Paternal Grandmother     No Known Problems Paternal Grandfather     Parkinsonism Other     Seizures Other     Breast cancer additional onset Neg Hx     BRCA2 Negative Neg Hx     BRCA2 Positive Neg Hx     BRCA1 Positive Neg Hx      BRCA1 Negative Neg Hx     BRCA 1/2 Neg Hx     Ovarian cancer Neg Hx     Endometrial cancer Neg Hx     Colon cancer Neg Hx       Social History     Tobacco Use    Smoking status: Never    Smokeless tobacco: Never   Vaping Use    Vaping status: Never Used   Substance Use Topics    Alcohol use: Yes     Alcohol/week: 2.0 standard drinks of alcohol     Types: 1 Glasses of wine, 1 Cans of beer per week     Comment: social    Drug use: Never      E-Cigarette/Vaping    E-Cigarette Use Never User       E-Cigarette/Vaping Substances    Nicotine No     THC No     CBD No     Flavoring No     Other No     Unknown No       I have reviewed and agree with the history as documented.     Patient is a pleasant 75 y/o female presenting with two days of worsening swelling in her left knee. Has had previous effusion of left knee that underwent arthrocentesis by orthopedic surgery. States she went for a PET scan 3 days ago and the way her left knee was position caused irritation and shortly thereafter she began to notice pain and swelling in her left knee. No direct trauma. No warmth. No cellulitis. Able to ambulate but decreased overall range of motion of left knee secondary to swelling.           Review of Systems   Constitutional:  Negative for appetite change, chills, fatigue, fever and unexpected weight change.   HENT:  Negative for congestion, ear pain, rhinorrhea and sore throat.    Eyes:  Negative for pain and visual disturbance.   Respiratory:  Negative for cough, chest tightness, shortness of breath and wheezing.    Cardiovascular:  Negative for chest pain, palpitations and leg swelling.   Gastrointestinal:  Negative for abdominal pain, constipation, diarrhea, nausea and vomiting.   Genitourinary:  Negative for difficulty urinating, dysuria, frequency, hematuria, menstrual problem, pelvic pain, vaginal bleeding and vaginal discharge.   Musculoskeletal:  Negative for arthralgias, back pain and neck pain.        Left knee pain and  swelling as per HPI   Skin:  Negative for color change and rash.   Neurological:  Negative for dizziness, seizures, syncope, light-headedness and headaches.   Psychiatric/Behavioral:  Negative for sleep disturbance.    All other systems reviewed and are negative.          Objective       ED Triage Vitals [11/16/24 1349]   Temperature Pulse Blood Pressure Respirations SpO2 Patient Position - Orthostatic VS   97.9 °F (36.6 °C) 97 146/69 18 95 % Sitting      Temp Source Heart Rate Source BP Location FiO2 (%) Pain Score    Temporal Monitor Left arm -- 1      Vitals      Date and Time Temp Pulse SpO2 Resp BP Pain Score FACES Pain Rating User   11/16/24 1507 -- -- -- -- -- Med Not Given for Pain - for MAR use only -- AB   11/16/24 1349 97.9 °F (36.6 °C) 97 95 % 18 146/69 1 with no movement -- SK            Physical Exam  Vitals and nursing note reviewed.   Constitutional:       General: She is not in acute distress.     Appearance: Normal appearance. She is well-developed and normal weight. She is not ill-appearing, toxic-appearing or diaphoretic.   HENT:      Head: Normocephalic and atraumatic.      Nose: Nose normal.      Mouth/Throat:      Mouth: Mucous membranes are moist.      Pharynx: Oropharynx is clear.   Eyes:      General: No scleral icterus.     Extraocular Movements: Extraocular movements intact.      Conjunctiva/sclera: Conjunctivae normal.   Cardiovascular:      Rate and Rhythm: Normal rate and regular rhythm.      Pulses: Normal pulses.      Heart sounds: Normal heart sounds. No murmur heard.     No friction rub. No gallop.   Pulmonary:      Effort: Pulmonary effort is normal. No respiratory distress.      Breath sounds: Normal breath sounds. No wheezing or rales.   Chest:      Chest wall: No tenderness.   Abdominal:      General: Bowel sounds are normal. There is no distension.      Palpations: Abdomen is soft. There is no mass.      Tenderness: There is no abdominal tenderness. There is no right CVA  "tenderness, left CVA tenderness, guarding or rebound.      Hernia: No hernia is present.   Musculoskeletal:         General: Swelling and tenderness present. No deformity. Normal range of motion.      Cervical back: Normal range of motion and neck supple. No rigidity or tenderness.      Right lower leg: No edema.      Left lower leg: No edema.        Legs:       Comments: Moderate sized joint effusion left knee. No calf pain tenderness or asymmetry.    Lymphadenopathy:      Cervical: No cervical adenopathy.   Skin:     General: Skin is warm and dry.      Capillary Refill: Capillary refill takes less than 2 seconds.      Coloration: Skin is not jaundiced or pale.      Findings: No bruising, erythema, lesion or rash.   Neurological:      General: No focal deficit present.      Mental Status: She is alert and oriented to person, place, and time. Mental status is at baseline.   Psychiatric:         Mood and Affect: Mood normal.         Behavior: Behavior normal.         Results Reviewed       None            XR knee 4+ views left injury    (Results Pending)       Arthrocentesis    Date/Time: 11/16/2024 3:14 PM    Performed by: Jona Mckee DO  Authorized by: Jona Mckee DO  Universal Protocol:  procedure performed by consultantConsent: Verbal consent obtained.  Risks and benefits: risks, benefits and alternatives were discussed  Consent given by: patient  Time out: Immediately prior to procedure a \"time out\" was called to verify the correct patient, procedure, equipment, support staff and site/side marked as required.  Patient understanding: patient states understanding of the procedure being performed  Patient identity confirmed: verbally with patient and arm band    Location:  Bedside  Indications:  Joint swelling and pain  Body area:  Knee  Joint:  Left knee  Local anesthesia used?: Yes    Anesthesia:  Local infiltration  Local anesthetic:  Bupivacaine 0.5% without epinephrine  Anesthetic " total (ml):  4  Preparation: Patient was prepped and draped in usual sterile fashion    Needle size:  18 G  Ultrasound guidance: No    Approach:  Lateral  Aspirate amount (ml):  65  Aspirate:  Yellow  Patient tolerance:  Patient tolerated the procedure well with no immediate complications      ED Medication and Procedure Management   Prior to Admission Medications   Prescriptions Last Dose Informant Patient Reported? Taking?   Calcium Citrate-Vitamin D (CALCIUM CITRATE + D PO)  Self Yes No   Sig: Take by mouth   Omega-3 Fatty Acids (FISH OIL PO)  Self Yes No   Sig: Take 2 g by mouth   SM VITAMIN D3 4000 units CAPS  Self Yes No   Sig: Take 2,000 Units by mouth daily    atorvastatin (LIPITOR) 10 mg tablet  Self No No   Sig: TAKE 1 TABLET BY MOUTH DAILY   brimonidine (ALPHAGAN P) 0.15 % ophthalmic solution  Self Yes No   Si drop 2 (two) times a day   carboxymethylcellulose 0.5 % SOLN  Self Yes No   Si drop 3 (three) times a day as needed for dry eyes   cycloSPORINE (RESTASIS) 0.05 % ophthalmic emulsion  Self Yes No   Sig: Apply 1 drop to eye   glucosamine-chondroitin 500-400 MG tablet  Self Yes No   Sig: Take 1 tablet by mouth   mometasone (NASONEX) 50 mcg/act nasal spray  Self No No   Si sprays into each nostril daily   multivitamin (THERAGRAN) TABS  Self Yes No   Sig: Take 1 tablet by mouth   omeprazole (PriLOSEC) 40 MG capsule   No No   Sig: Take 1 capsule (40 mg total) by mouth daily   sucralfate (CARAFATE) 1 g/10 mL suspension   No No   Sig: Take 10 mL (1 g total) by mouth 4 (four) times a day (with meals and at bedtime)      Facility-Administered Medications: None     Discharge Medication List as of 2024  3:35 PM        CONTINUE these medications which have NOT CHANGED    Details   atorvastatin (LIPITOR) 10 mg tablet TAKE 1 TABLET BY MOUTH DAILY, Starting Mon 2024, Normal      brimonidine (ALPHAGAN P) 0.15 % ophthalmic solution 1 drop 2 (two) times a day, Historical Med      Calcium  Citrate-Vitamin D (CALCIUM CITRATE + D PO) Take by mouth, Historical Med      carboxymethylcellulose 0.5 % SOLN 1 drop 3 (three) times a day as needed for dry eyes, Historical Med      cycloSPORINE (RESTASIS) 0.05 % ophthalmic emulsion Apply 1 drop to eye, Historical Med      glucosamine-chondroitin 500-400 MG tablet Take 1 tablet by mouth, Historical Med      mometasone (NASONEX) 50 mcg/act nasal spray 2 sprays into each nostril daily, Starting Thu 2/15/2024, Normal      multivitamin (THERAGRAN) TABS Take 1 tablet by mouth, Historical Med      Omega-3 Fatty Acids (FISH OIL PO) Take 2 g by mouth, Historical Med      omeprazole (PriLOSEC) 40 MG capsule Take 1 capsule (40 mg total) by mouth daily, Starting Thu 9/5/2024, Normal      SM VITAMIN D3 4000 units CAPS Take 2,000 Units by mouth daily , Historical Med      sucralfate (CARAFATE) 1 g/10 mL suspension Take 10 mL (1 g total) by mouth 4 (four) times a day (with meals and at bedtime), Starting Thu 9/5/2024, Normal             ED SEPSIS DOCUMENTATION   Time reflects when diagnosis was documented in both MDM as applicable and the Disposition within this note       Time User Action Codes Description Comment    11/16/2024  3:34 PM Jona Mckee Add [M25.462] Knee effusion, left     11/16/2024  3:34 PM Jona Mckee Add [M25.562] Left knee pain                  Jona Mckee, DO  11/17/24 0716

## 2024-11-18 ENCOUNTER — TELEPHONE (OUTPATIENT)
Age: 74
End: 2024-11-18

## 2024-11-18 ENCOUNTER — RESULTS FOLLOW-UP (OUTPATIENT)
Dept: FAMILY MEDICINE CLINIC | Facility: CLINIC | Age: 74
End: 2024-11-18

## 2024-11-18 NOTE — TELEPHONE ENCOUNTER
Scheduled date of colonoscopy (as of today):12/6/24  Physician performing colonoscopy:DR HOLLOWAY  Location of colonoscopy:[facility]  Bowel prep reviewed with patient:ZANE/MICHAELA PT HAS  Instructions reviewed with patient by:JOSE  Clearances: NA

## 2024-11-20 ENCOUNTER — APPOINTMENT (OUTPATIENT)
Dept: LAB | Facility: CLINIC | Age: 74
End: 2024-11-20
Payer: MEDICARE

## 2024-11-20 ENCOUNTER — OFFICE VISIT (OUTPATIENT)
Dept: OBGYN CLINIC | Facility: CLINIC | Age: 74
End: 2024-11-20
Payer: MEDICARE

## 2024-11-20 VITALS
TEMPERATURE: 98.4 F | WEIGHT: 151.8 LBS | DIASTOLIC BLOOD PRESSURE: 77 MMHG | HEIGHT: 63 IN | SYSTOLIC BLOOD PRESSURE: 127 MMHG | HEART RATE: 88 BPM | BODY MASS INDEX: 26.9 KG/M2

## 2024-11-20 DIAGNOSIS — G89.29 CHRONIC PAIN OF LEFT KNEE: ICD-10-CM

## 2024-11-20 DIAGNOSIS — M25.562 CHRONIC PAIN OF LEFT KNEE: ICD-10-CM

## 2024-11-20 DIAGNOSIS — M25.462 KNEE EFFUSION, LEFT: ICD-10-CM

## 2024-11-20 DIAGNOSIS — M25.462 KNEE EFFUSION, LEFT: Primary | ICD-10-CM

## 2024-11-20 LAB
APPEARANCE FLD: ABNORMAL
B BURGDOR IGG+IGM SER QL IA: NEGATIVE
COLOR FLD: ABNORMAL
CRYSTALS SNV QL MICRO: NORMAL
LYMPHOCYTES # SNV MANUAL: 14 %
MONOCYTES NFR SNV MANUAL: 33 %
NEUTROPHILS NFR SNV MANUAL: 53 %
SITE: ABNORMAL
TOTAL CELLS COUNTED SPEC: 100
WBC # FLD MANUAL: 1279 /UL (ref 0–200)

## 2024-11-20 PROCEDURE — 20610 DRAIN/INJ JOINT/BURSA W/O US: CPT | Performed by: FAMILY MEDICINE

## 2024-11-20 PROCEDURE — 89060 EXAM SYNOVIAL FLUID CRYSTALS: CPT | Performed by: FAMILY MEDICINE

## 2024-11-20 PROCEDURE — 87476 LYME DIS DNA AMP PROBE: CPT

## 2024-11-20 PROCEDURE — 86618 LYME DISEASE ANTIBODY: CPT

## 2024-11-20 PROCEDURE — 87070 CULTURE OTHR SPECIMN AEROBIC: CPT | Performed by: FAMILY MEDICINE

## 2024-11-20 PROCEDURE — 87205 SMEAR GRAM STAIN: CPT | Performed by: FAMILY MEDICINE

## 2024-11-20 PROCEDURE — 36415 COLL VENOUS BLD VENIPUNCTURE: CPT

## 2024-11-20 PROCEDURE — 89051 BODY FLUID CELL COUNT: CPT | Performed by: FAMILY MEDICINE

## 2024-11-20 PROCEDURE — 99214 OFFICE O/P EST MOD 30 MIN: CPT | Performed by: FAMILY MEDICINE

## 2024-11-20 RX ORDER — LIDOCAINE HYDROCHLORIDE 10 MG/ML
4 INJECTION, SOLUTION INFILTRATION; PERINEURAL
Status: COMPLETED | OUTPATIENT
Start: 2024-11-20 | End: 2024-11-20

## 2024-11-20 RX ADMIN — LIDOCAINE HYDROCHLORIDE 4 ML: 10 INJECTION, SOLUTION INFILTRATION; PERINEURAL at 09:00

## 2024-11-20 NOTE — PROGRESS NOTES
"[facility] [address] [address] [phone]      Assessment:  1. Knee effusion, left  -     Ambulatory Referral to Orthopedic Surgery  -     Lyme Total AB W Reflex to IGM/IGG; Future  -     Body fluid culture and Gram stain; Future  -     Synovial fluid white cell count w/ diff; Future  -     Lyme disease, PCR; Future  -     Synovial fluid, crystal; Future  2. Chronic pain of left knee  -     Lyme Total AB W Reflex to IGM/IGG; Future  -     Body fluid culture and Gram stain; Future  -     Synovial fluid white cell count w/ diff; Future  -     Lyme disease, PCR; Future  -     Synovial fluid, crystal; Future      Plan:  Patient Instructions   F/u 3 wks  Knee aspiration  Icing/heat/OTC pain meds as needed.  Knee sleeve/ace bandage  Labs sent   Return in about 3 weeks (around 12/11/2024) for Recheck.    Chief Complaint:  Chief Complaint   Patient presents with    Left Knee - Follow-up       Subjective:   HPI    Patient ID: Lucy Mars is a 74 y.o. female     Here c/o L knee pain and swelling  Seen in ER.  Aspiration. Note reviewed  Knee swelled again.  No pain walking  Pain bending. Feels full  Dull pain.  No hx of gout  Better at rest.  Tylenol PRN- helps.  [place]    Review of Systems   Constitutional:  Negative for fatigue and fever.   Respiratory:  Negative for shortness of breath.    Cardiovascular:  Negative for chest pain.   Gastrointestinal:  Negative for abdominal pain and nausea.   Genitourinary:  Negative for dysuria.   Musculoskeletal:  Positive for arthralgias and joint swelling.   Skin:  Negative for rash and wound.   Neurological:  Negative for weakness and headaches.       Objective:  Vitals:  /77 (BP Location: Left arm, Patient Position: Sitting, Cuff Size: Standard)   Pulse 88   Temp 98.4 °F (36.9 °C) (Tympanic)   Ht 5' 2.5\" (1.588 m)   Wt 68.9 kg (151 lb 12.8 oz)   LMP  (LMP Unknown)   BMI " "27.32 kg/m²     The following portions of the patient's history were reviewed and updated as appropriate: allergies, current medications, past family history, past medical history, past social history, past surgical history, and problem list.    Physical exam:  Physical Exam  Constitutional:       Appearance: Normal appearance. She is normal weight.   HENT:      Head: Normocephalic.   Eyes:      Extraocular Movements: Extraocular movements intact.   Pulmonary:      Effort: Pulmonary effort is normal.   Musculoskeletal:      Cervical back: Normal range of motion.   Skin:     General: Skin is warm and dry.   Neurological:      General: No focal deficit present.      Mental Status: She is alert and oriented to person, place, and time. Mental status is at baseline.   Psychiatric:         Mood and Affect: Mood normal.         Behavior: Behavior normal.         Thought Content: Thought content normal.         Judgment: Judgment normal.       Ortho Exam    I have personally reviewed pertinent films in PACS and my interpretation is XR L knee-  mod OA-  chondrocalcinosis.  Large joint arthrocentesis: L knee  Universal Protocol:  procedure performed by consultantConsent: Verbal consent obtained.  Risks and benefits: risks, benefits and alternatives were discussed  Consent given by: patient  Time out: Immediately prior to procedure a \"time out\" was called to verify the correct patient, procedure, equipment, support staff and site/side marked as required.  Timeout called at: 11/20/2024 9:02 AM.  Site marked: the operative site was marked  Supporting Documentation  Indications: pain   Procedure Details  Location: knee - L knee  Preparation: Patient was prepped and draped in the usual sterile fashion  Needle size: 18 G  Ultrasound guidance: no  Approach: lateral  Medications administered: 4 mL lidocaine 1 %    Aspirate amount: 18 mL  Aspirate: blood-tinged and yellow  Analysis: fluid sample sent for laboratory analysis  Patient " tolerance: patient tolerated the procedure well with no immediate complications  Dressing:  Sterile dressing applied            Arsalan Puentes MD

## 2024-11-20 NOTE — PATIENT INSTRUCTIONS
F/u 3 wks  Knee aspiration  Icing/heat/OTC pain meds as needed.  Knee sleeve/ace bandage  Labs sent

## 2024-11-22 LAB — B BURGDOR DNA SPEC QL NAA+PROBE: NEGATIVE

## 2024-11-23 LAB
BACTERIA SPEC BFLD CULT: NO GROWTH
GRAM STN SPEC: NORMAL
GRAM STN SPEC: NORMAL

## 2024-12-02 NOTE — PROGRESS NOTES
Assessment/Plan:    Recommended monthly SBE, annual CBE and annual screening mammo. ASCCP guidelines reviewed and pap with cotesting done given hx of adenocarcinoma. Pt made aware of possible financial responsibility.  DEXA and colonoscopy noted to be up to date. Reviewed diet/activity recommendations Calcium 1200   mg and Vit D 600-1000 IU daily.  Discussed postmenopausal considerations and symptoms to report. Kegel exercises as instructed. RTO in one year for routine annual gyn exam or sooner PRN.        Diagnoses and all orders for this visit:    Encounter for gynecological examination (general) (routine) without abnormal findings    Screening mammogram for breast cancer  -     Mammo screening bilateral w 3d and cad; Future        Subjective:      Patient ID: Lucy Mars is a 74 y.o. female.    This patient presents for routine annual gyn exam.  She has a hx of hysterectomy at [facility] for endometrial adenocarcinoma in 2014. Patient states she has been released from oncology.   She denies gyn concerns. She denies vaginal bleeding or spotting, VM sx, pelvic pain,  breast concerns, abnormal discharge, bowel/bladder dysfunction, depression/anx.   , not sexually active.   Pap/HPV up to date and normal, 10/18/22.  Mammography up to date and normal, 11/11/24.  Osteoporosis screening up to date, osteopenia, 11/13/24. Colonoscopy up to date, 11/8/21.          The following portions of the patient's history were reviewed and updated as appropriate: allergies, current medications, past family history, past medical history, past social history, past surgical history and problem list.    Review of Systems   Constitutional: Negative.    Respiratory: Negative.     Cardiovascular: Negative.    Gastrointestinal: Negative.    Endocrine: Negative.    Genitourinary:  Negative for dysuria, frequency, pelvic pain, urgency, vaginal bleeding, vaginal discharge and vaginal pain.   Musculoskeletal: Negative.    Skin: Negative.   "  Neurological: Negative.    Psychiatric/Behavioral: Negative.           Objective:      /82 (BP Location: Right leg, Patient Position: Sitting, Cuff Size: Standard)   Pulse 103   Ht 5' 2.5\" (1.588 m)   Wt 70.5 kg (155 lb 6.4 oz)   LMP  (LMP Unknown)   BMI 27.97 kg/m²          Physical Exam  Vitals and nursing note reviewed. Exam conducted with a chaperone present.   Constitutional:       Appearance: Normal appearance. She is well-developed.   HENT:      Head: Normocephalic and atraumatic.   Neck:      Thyroid: No thyroid mass or thyromegaly.   Cardiovascular:      Rate and Rhythm: Normal rate and regular rhythm.      Heart sounds: Normal heart sounds.   Pulmonary:      Effort: Pulmonary effort is normal.      Breath sounds: Normal breath sounds.   Chest:   Breasts:     Breasts are symmetrical.      Right: No inverted nipple, mass, nipple discharge, skin change or tenderness.      Left: No inverted nipple, mass, nipple discharge, skin change or tenderness.   Abdominal:      General: Bowel sounds are normal.      Palpations: Abdomen is soft.      Tenderness: There is no abdominal tenderness.      Hernia: There is no hernia in the left inguinal area or right inguinal area.   Genitourinary:     General: Normal vulva.      Exam position: Supine.      Pubic Area: No rash.       Labia:         Right: No rash, tenderness, lesion or injury.         Left: No rash, tenderness, lesion or injury.       Urethra: No prolapse, urethral pain, urethral swelling or urethral lesion.      Vagina: Normal. No signs of injury and foreign body. No vaginal discharge, erythema, tenderness, bleeding, lesions or prolapsed vaginal walls.      Adnexa:         Right: No mass, tenderness or fullness.          Left: No mass, tenderness or fullness.        Rectum: No external hemorrhoid.      Comments: Urethra normal without lesions  No bladder tenderness  Cervix, uterus absent, no masses or tenderness on BME  Musculoskeletal:         " General: Normal range of motion.      Cervical back: Normal range of motion and neck supple.   Lymphadenopathy:      Lower Body: No right inguinal adenopathy. No left inguinal adenopathy.   Skin:     General: Skin is warm and dry.   Neurological:      Mental Status: She is alert and oriented to person, place, and time.   Psychiatric:         Speech: Speech normal.         Behavior: Behavior normal. Behavior is cooperative.

## 2024-12-03 ENCOUNTER — ANNUAL EXAM (OUTPATIENT)
Dept: GYNECOLOGY | Facility: CLINIC | Age: 74
End: 2024-12-03
Payer: MEDICARE

## 2024-12-03 VITALS
DIASTOLIC BLOOD PRESSURE: 82 MMHG | HEART RATE: 103 BPM | BODY MASS INDEX: 27.54 KG/M2 | SYSTOLIC BLOOD PRESSURE: 124 MMHG | WEIGHT: 155.4 LBS | HEIGHT: 63 IN

## 2024-12-03 DIAGNOSIS — Z01.419 ENCOUNTER FOR GYNECOLOGICAL EXAMINATION (GENERAL) (ROUTINE) WITHOUT ABNORMAL FINDINGS: Primary | ICD-10-CM

## 2024-12-03 DIAGNOSIS — Z12.31 SCREENING MAMMOGRAM FOR BREAST CANCER: ICD-10-CM

## 2024-12-03 PROCEDURE — G0476 HPV COMBO ASSAY CA SCREEN: HCPCS | Performed by: OBSTETRICS & GYNECOLOGY

## 2024-12-03 PROCEDURE — G0101 CA SCREEN;PELVIC/BREAST EXAM: HCPCS | Performed by: OBSTETRICS & GYNECOLOGY

## 2024-12-03 PROCEDURE — G0145 SCR C/V CYTO,THINLAYER,RESCR: HCPCS | Performed by: OBSTETRICS & GYNECOLOGY

## 2024-12-08 DIAGNOSIS — E78.5 HYPERLIPIDEMIA, UNSPECIFIED HYPERLIPIDEMIA TYPE: ICD-10-CM

## 2024-12-09 RX ORDER — ATORVASTATIN CALCIUM 10 MG/1
10 TABLET, FILM COATED ORAL DAILY
Qty: 90 TABLET | Refills: 1 | Status: SHIPPED | OUTPATIENT
Start: 2024-12-09

## 2024-12-10 LAB
LAB AP GYN PRIMARY INTERPRETATION: NORMAL
Lab: NORMAL

## 2024-12-11 ENCOUNTER — OFFICE VISIT (OUTPATIENT)
Dept: OBGYN CLINIC | Facility: CLINIC | Age: 74
End: 2024-12-11
Payer: MEDICARE

## 2024-12-11 ENCOUNTER — RESULTS FOLLOW-UP (OUTPATIENT)
Dept: GYNECOLOGY | Facility: CLINIC | Age: 74
End: 2024-12-11

## 2024-12-11 VITALS
TEMPERATURE: 97.9 F | HEART RATE: 84 BPM | HEIGHT: 63 IN | DIASTOLIC BLOOD PRESSURE: 79 MMHG | BODY MASS INDEX: 27.46 KG/M2 | WEIGHT: 155 LBS | SYSTOLIC BLOOD PRESSURE: 123 MMHG

## 2024-12-11 DIAGNOSIS — G89.29 CHRONIC PAIN OF LEFT KNEE: ICD-10-CM

## 2024-12-11 DIAGNOSIS — M25.562 CHRONIC PAIN OF LEFT KNEE: ICD-10-CM

## 2024-12-11 DIAGNOSIS — M17.12 PRIMARY OSTEOARTHRITIS OF ONE KNEE, LEFT: Primary | ICD-10-CM

## 2024-12-11 DIAGNOSIS — M25.462 KNEE EFFUSION, LEFT: ICD-10-CM

## 2024-12-11 PROCEDURE — 99213 OFFICE O/P EST LOW 20 MIN: CPT | Performed by: FAMILY MEDICINE

## 2024-12-11 NOTE — PROGRESS NOTES
"[facility] [address] [place]  [facility] [address] [phone]      Assessment:  1. Primary osteoarthritis of one knee, left  -     Ambulatory Referral to Physical Therapy; Future  2. Knee effusion, left  -     Ambulatory Referral to Physical Therapy; Future  3. Chronic pain of left knee  -     Ambulatory Referral to Physical Therapy; Future      Plan:  Patient Instructions   F/u as needed.  Begin physical therapy  Consider steroid injection or visco if no improvement   Return if symptoms worsen or fail to improve.    Chief Complaint:  Chief Complaint   Patient presents with    Left Knee - Follow-up       Subjective:   HPI    Patient ID: Lucy Mars is a 74 y.o. female     Here for f/u L knee pain and swelling    Knee swelled again.  Wearing knee compression  Felt a flick like pain when pulling luggage down steps  No pain walking  No Pain bending.   No hx of gout  No pain meds    Review of Systems   Constitutional:  Negative for fatigue and fever.   Respiratory:  Negative for shortness of breath.    Cardiovascular:  Negative for chest pain.   Gastrointestinal:  Negative for abdominal pain and nausea.   Genitourinary:  Negative for dysuria.   Musculoskeletal:  Positive for arthralgias.   Skin:  Negative for rash and wound.   Neurological:  Negative for weakness and headaches.       Objective:  Vitals:  /79   Pulse 84   Temp 97.9 °F (36.6 °C)   Ht 5' 2.5\" (1.588 m)   Wt 70.3 kg (155 lb)   LMP  (LMP Unknown)   BMI 27.90 kg/m²     The following portions of the patient's history were reviewed and updated as appropriate: allergies, current medications, past family history, past medical history, past social history, past surgical history, and problem list.    Physical exam:  Physical Exam  Constitutional:       Appearance: Normal appearance. She is normal weight.   HENT:      Head: Normocephalic.   Eyes:      Extraocular Movements: Extraocular movements " intact.   Pulmonary:      Effort: Pulmonary effort is normal.   Musculoskeletal:         General: Tenderness present.      Cervical back: Normal range of motion.      Right knee: No effusion.      Instability Tests: Medial Ghada test negative and lateral Ghada test negative.   Skin:     General: Skin is warm and dry.   Neurological:      General: No focal deficit present.      Mental Status: She is alert and oriented to person, place, and time. Mental status is at baseline.   Psychiatric:         Mood and Affect: Mood normal.         Behavior: Behavior normal.         Thought Content: Thought content normal.         Judgment: Judgment normal.       Right Knee Exam     Tenderness   The patient is experiencing tenderness in the medial joint line.    Range of Motion   The patient has normal right knee ROM.    Tests   Ghada:  Medial - negative Lateral - negative  Varus: negative Valgus: negative    Other   Swelling: none  Effusion: no effusion present          Observations     Right Knee   Negative for effusion.             Arsalan Puentes MD

## 2024-12-16 ENCOUNTER — ANESTHESIA EVENT (OUTPATIENT)
Dept: GASTROENTEROLOGY | Facility: HOSPITAL | Age: 74
End: 2024-12-16
Payer: MEDICARE

## 2024-12-16 ENCOUNTER — ANESTHESIA (OUTPATIENT)
Dept: GASTROENTEROLOGY | Facility: HOSPITAL | Age: 74
End: 2024-12-16
Payer: MEDICARE

## 2024-12-16 ENCOUNTER — HOSPITAL ENCOUNTER (OUTPATIENT)
Dept: GASTROENTEROLOGY | Facility: HOSPITAL | Age: 74
Setting detail: OUTPATIENT SURGERY
Discharge: HOME | End: 2024-12-16
Attending: STUDENT IN AN ORGANIZED HEALTH CARE EDUCATION/TRAINING PROGRAM
Payer: MEDICARE

## 2024-12-16 VITALS
RESPIRATION RATE: 16 BRPM | SYSTOLIC BLOOD PRESSURE: 115 MMHG | TEMPERATURE: 97.5 F | DIASTOLIC BLOOD PRESSURE: 60 MMHG | OXYGEN SATURATION: 94 % | HEART RATE: 75 BPM

## 2024-12-16 DIAGNOSIS — K52.9 COLITIS: ICD-10-CM

## 2024-12-16 DIAGNOSIS — R10.9 ABDOMINAL PAIN: ICD-10-CM

## 2024-12-16 DIAGNOSIS — R11.2 NAUSEA, VOMITING AND DIARRHEA: ICD-10-CM

## 2024-12-16 DIAGNOSIS — R19.7 NAUSEA, VOMITING AND DIARRHEA: ICD-10-CM

## 2024-12-16 PROCEDURE — 45380 COLONOSCOPY AND BIOPSY: CPT | Performed by: STUDENT IN AN ORGANIZED HEALTH CARE EDUCATION/TRAINING PROGRAM

## 2024-12-16 PROCEDURE — 88305 TISSUE EXAM BY PATHOLOGIST: CPT | Performed by: PATHOLOGY

## 2024-12-16 RX ORDER — SODIUM CHLORIDE, SODIUM LACTATE, POTASSIUM CHLORIDE, CALCIUM CHLORIDE 600; 310; 30; 20 MG/100ML; MG/100ML; MG/100ML; MG/100ML
125 INJECTION, SOLUTION INTRAVENOUS CONTINUOUS
Status: DISCONTINUED | OUTPATIENT
Start: 2024-12-16 | End: 2024-12-20 | Stop reason: HOSPADM

## 2024-12-16 RX ORDER — PROPOFOL 10 MG/ML
INJECTION, EMULSION INTRAVENOUS AS NEEDED
Status: DISCONTINUED | OUTPATIENT
Start: 2024-12-16 | End: 2024-12-16

## 2024-12-16 RX ORDER — LIDOCAINE HYDROCHLORIDE 20 MG/ML
INJECTION, SOLUTION EPIDURAL; INFILTRATION; INTRACAUDAL; PERINEURAL AS NEEDED
Status: DISCONTINUED | OUTPATIENT
Start: 2024-12-16 | End: 2024-12-16

## 2024-12-16 RX ADMIN — SODIUM CHLORIDE, SODIUM LACTATE, POTASSIUM CHLORIDE, AND CALCIUM CHLORIDE 125 ML/HR: .6; .31; .03; .02 INJECTION, SOLUTION INTRAVENOUS at 10:13

## 2024-12-16 RX ADMIN — LIDOCAINE HYDROCHLORIDE 60 MG: 20 INJECTION, SOLUTION EPIDURAL; INFILTRATION; INTRACAUDAL; PERINEURAL at 10:50

## 2024-12-16 RX ADMIN — PROPOFOL 80 MG: 10 INJECTION, EMULSION INTRAVENOUS at 10:50

## 2024-12-16 RX ADMIN — PROPOFOL 120 MCG/KG/MIN: 10 INJECTION, EMULSION INTRAVENOUS at 10:51

## 2024-12-16 NOTE — ANESTHESIA POSTPROCEDURE EVALUATION
Post-Op Assessment Note    CV Status:  Stable    Pain management: adequate       Mental Status:  Alert and awake   Hydration Status:  Euvolemic   PONV Controlled:  Controlled   Airway Patency:  Patent     Post Op Vitals Reviewed: Yes    No anethesia notable event occurred.    Staff: Anesthesiologist       Last Filed PACU Vitals:  Vitals Value Taken Time   Temp 97.5 °F (36.4 °C) 12/16/24 1109   Pulse 73 12/16/24 1109   /58 12/16/24 1109   Resp 12 12/16/24 1109   SpO2 96 % 12/16/24 1109       Modified Rebecca:  No data recorded

## 2024-12-16 NOTE — H&P
[facility]  History & Physical     PATIENT INFO     Name: Lucy Mars  YOB: 1950   Age: 74 y.o.   Sex: female   MRN: [id number]     HISTORY OF PRESENT ILLNESS     Lucy Mars is a 74 y.o. year old female who presents for colonoscopy for colitis, abdominal pain.  Last colonoscopy in .  No antithrombotics or anticoagulants.     REVIEW OF SYSTEMS     Per the HPI, and otherwise unremarkable.    Historical Information   Past Medical History:   Diagnosis Date    Achilles tendon tear ,    bilateral    Allergic rhinitis due to pollen     Anxiety     Colon polyp     Depression     Endometrial cancer (HCC)     History of rectal bleeding 2023    HPV (human papilloma virus) infection     Hyperlipidemia     Kidney stones     renal calculi    Osteoporosis     UTI (urinary tract infection)      Past Surgical History:   Procedure Laterality Date     SECTION  ,    COLONOSCOPY      DXA PROCEDURE (HISTORICAL)      HYSTERECTOMY Bilateral 2014    salpino-ooph    OOPHORECTOMY Bilateral 2014    TENDON REPAIR  ,    Archilles tendon repair-bilateral     Social History   Social History     Substance and Sexual Activity   Alcohol Use Yes    Alcohol/week: 2.0 standard drinks of alcohol    Types: 1 Glasses of wine, 1 Cans of beer per week    Comment: social     Social History     Substance and Sexual Activity   Drug Use Never     Social History     Tobacco Use   Smoking Status Never   Smokeless Tobacco Never     Family History   Adopted: Yes   Problem Relation Age of Onset    No Known Problems Mother     No Known Problems Father     Breast cancer Daughter 36    No Known Problems Maternal Grandmother     No Known Problems Maternal Grandfather     No Known Problems Paternal Grandmother     No Known Problems Paternal Grandfather     Parkinsonism Other     Seizures Other     Breast cancer additional onset Neg Hx     BRCA2 Negative Neg Hx      BRCA2 Positive Neg Hx     BRCA1 Positive Neg Hx     BRCA1 Negative Neg Hx     BRCA 1/2 Neg Hx     Ovarian cancer Neg Hx     Endometrial cancer Neg Hx     Colon cancer Neg Hx         MEDICATIONS & ALLERGIES     Current Outpatient Medications   Medication Instructions    atorvastatin (LIPITOR) 10 mg, Oral, Daily    brimonidine (ALPHAGAN P) 0.15 % ophthalmic solution 1 drop, 2 times daily    Calcium Citrate-Vitamin D (CALCIUM CITRATE + D PO) Take by mouth    carboxymethylcellulose 0.5 % SOLN 1 drop, 3 times daily PRN    cycloSPORINE (RESTASIS) 0.05 % ophthalmic emulsion 1 drop    glucosamine-chondroitin 500-400 MG tablet 1 tablet    mometasone (NASONEX) 50 mcg/act nasal spray 2 sprays, Nasal, Daily    multivitamin (THERAGRAN) TABS 1 tablet    Omega-3 Fatty Acids (FISH OIL PO) 2 g    omeprazole (PRILOSEC) 40 mg, Oral, Daily    SM Vitamin D3 2,000 Units, Daily    sucralfate (CARAFATE) 1 g, Oral, 4 times daily (with meals and at bedtime)     Allergies   Allergen Reactions    Cefaclor Other (See Comments)     swelling and itching of hands and feet    Cephalosporins Itching    Prednisone Other (See Comments)     disconnected feeling        PHYSICAL EXAM      Objective   Blood pressure 128/60, pulse 78, temperature 97.6 °F (36.4 °C), temperature source Temporal, resp. rate 18, SpO2 95%. There is no height or weight on file to calculate BMI.    General Appearance:   Alert, cooperative, no distress   Lungs:   Equal chest rise, respirations unlabored    Heart:   Regular rate and rhythm   Abdomen:   Soft, non-tender, non-distended; normal bowel sounds; no masses, no organomegaly    Extremities:   No edema       ASSESSMENT & PLAN     This is a 74 y.o. year old female here for colonoscopy, and she is stable and optimized for her procedure.      Alex Lux D.O.  [facility]  Division of Gastroenterology & Hepatology  Available on TigerText  [email or web address]    ** Please Note: This note is constructed  using a voice recognition dictation system. **

## 2024-12-16 NOTE — ANESTHESIA PREPROCEDURE EVALUATION
Procedure:  COLONOSCOPY    Relevant Problems   CARDIO   (+) Hyperlipidemia      /RENAL   (+) Nephrolithiasis        Physical Exam    Airway    Mallampati score: II         Dental       Cardiovascular  Cardiovascular exam normal    Pulmonary  Pulmonary exam normal     Other Findings        Anesthesia Plan  ASA Score- 2     Anesthesia Type- IV sedation with anesthesia with ASA Monitors.         Additional Monitors:     Airway Plan:            Plan Factors-    Chart reviewed.   Existing labs reviewed. Patient summary reviewed.    Patient is not a current smoker.              Induction- intravenous.    Postoperative Plan-         Informed Consent- Anesthetic plan and risks discussed with patient and spouse.  I personally reviewed this patient with the CRNA. Discussed and agreed on the Anesthesia Plan with the CRNA..

## 2024-12-18 PROCEDURE — 88305 TISSUE EXAM BY PATHOLOGIST: CPT | Performed by: PATHOLOGY

## 2024-12-19 ENCOUNTER — RESULTS FOLLOW-UP (OUTPATIENT)
Age: 74
End: 2024-12-19

## 2024-12-31 ENCOUNTER — OFFICE VISIT (OUTPATIENT)
Age: 74
End: 2024-12-31
Payer: MEDICARE

## 2024-12-31 VITALS
SYSTOLIC BLOOD PRESSURE: 120 MMHG | HEART RATE: 105 BPM | HEIGHT: 63 IN | BODY MASS INDEX: 27.38 KG/M2 | WEIGHT: 154.5 LBS | TEMPERATURE: 97.6 F | OXYGEN SATURATION: 98 % | DIASTOLIC BLOOD PRESSURE: 62 MMHG

## 2024-12-31 DIAGNOSIS — R10.13 EPIGASTRIC PAIN: ICD-10-CM

## 2024-12-31 DIAGNOSIS — R10.9 ABDOMINAL PAIN: ICD-10-CM

## 2024-12-31 DIAGNOSIS — K21.9 GASTROESOPHAGEAL REFLUX DISEASE, UNSPECIFIED WHETHER ESOPHAGITIS PRESENT: Primary | ICD-10-CM

## 2024-12-31 DIAGNOSIS — Z86.0100 HISTORY OF COLONIC POLYPS: ICD-10-CM

## 2024-12-31 PROBLEM — K52.9 COLITIS: Status: RESOLVED | Noted: 2024-09-05 | Resolved: 2024-12-31

## 2024-12-31 PROCEDURE — 99214 OFFICE O/P EST MOD 30 MIN: CPT | Performed by: NURSE PRACTITIONER

## 2024-12-31 NOTE — ASSESSMENT & PLAN NOTE
Improved/Stable    At this point in time, as per the patient's wishes and as per our discussion, we will have her decrease the omeprazole down to 20 mg every other day and on the opposite days take 40 mg x 2 weeks and then as long as there are no side effects or issues, she is to decrease omeprazole down to 20 mg daily from now on until the next office visit.  The patient is to contact our office if she experiences any side effects or issues with the changes in her medication regimen.  The patient was agreeable and verbalized an understanding.    Encouraged the patient continue to avoid acidic or trigger foods much as possible.  Orders:    omeprazole (PriLOSEC) 20 mg delayed release capsule; Take 1 Pill daily in AM prior to a meal.

## 2024-12-31 NOTE — PATIENT INSTRUCTIONS
Decrease Omeprazole to 20 mg every other day and on the opposite days, 40 mg x 2 weeks, then decrease to 20 mg daily, unless you have issues.   Continue to avoid acidic or trigger foods as much as possible.   Continue to watch for red flag symptoms.   Schedule a f/u OV in 6 months.     We did review GI red flag symptoms, including, but not limited to: chronic nausea, vomiting, diarrhea, chills, fever, and unintentional weight loss and should call or contact our office with any changes or concerns. I reviewed with the patient that if they notice any blood while vomiting or in their stool they should contact or office or go to the nearest emergency room for immediate evaluation. The patient was agreeable and verbalized an understanding.

## 2024-12-31 NOTE — PROGRESS NOTES
Name: Lucy Mars      : 1950      MRN: [id number]  Encounter Provider: LENORE Anaya  Encounter Date: 2024   Encounter department: [facility] ARIEL HICKS  :  Assessment & Plan  Gastroesophageal reflux disease, unspecified whether esophagitis present  Improved/Stable    At this point in time, as per the patient's wishes and as per our discussion, we will have her decrease the omeprazole down to 20 mg every other day and on the opposite days take 40 mg x 2 weeks and then as long as there are no side effects or issues, she is to decrease omeprazole down to 20 mg daily from now on until the next office visit.  The patient is to contact our office if she experiences any side effects or issues with the changes in her medication regimen.  The patient was agreeable and verbalized an understanding.    Encouraged the patient continue to avoid acidic or trigger foods much as possible.  Orders:    omeprazole (PriLOSEC) 20 mg delayed release capsule; Take 1 Pill daily in AM prior to a meal.    Epigastric pain  Orders:    omeprazole (PriLOSEC) 20 mg delayed release capsule; Take 1 Pill daily in AM prior to a meal.    Abdominal pain  Orders:    omeprazole (PriLOSEC) 20 mg delayed release capsule; Take 1 Pill daily in AM prior to a meal.    History of colonic polyps  Since the patient is status post a colonoscopy, but is currently not showing any red flag symptoms, we will proceed with a repeat colonoscopy as recommended unless they would start to develop red flag symptoms in the future: DUE: 31     Reviewed GI red flag symptoms with patient today.         The patient is to schedule a follow up office visit in 6 months, but understands to call or contact our offices with any issues before then or as needed.     History of Present Illness   HPI  Lucy Mars is a 74 y.o. female who presents today for a follow-up office visit as she is status post her most recent  colonoscopy with a history of colitis, GERD symptoms, epigastric pain, abdominal pain, and a history of colon polyps.  Since the last office visit the patient did proceed with a colonoscopy as discussed on December 16, 2024 with Dr. Lux which showed small internal hemorrhoids, but was otherwise normal with a recommendation of a repeat colonoscopy in 7 years secondary to the personal history of colon polyps.    The patient reports that since her last office visit she has only had 1 episode of reflux even though she has completely discontinued the Carafate that she was weaning herself down and currently is only taking the omeprazole 40 mg daily and would like to discuss the titration down and hopefully off of the omeprazole as we previously discussed.\    The patient currently denies any reflux, nausea, dysphagia, vomiting, decreased appetite, unplanned weight loss, or abdominal pain. Water Intake: Adequate amounts per day.    The patient currently reports that they have a BM every other day and reports that it is usually relieving, without any consistent diarrhea, nocturnal BMs, constipation, straining, melena or bloody stools. Last BM: Yesterday. Flatus: Yes.    Meds: Omeprazole 40 mg daily in the AM.  Daily NSAID Use: Denied.    Imaging: (None):     Endoscopy History: EGD: (7/14/23): Gastric polyp. Path: Normal.     COLONOSCOPY: (12/16/24): Small internal hemorrhoids, otherwise normal with a recommendation of a repeat colonoscopy in 7 years secondary to the personal history of colon polyps.    DUE: 12/16/31     History obtained from: patient    Review of Systems       Objective   LMP  (LMP Unknown)      Physical Exam  Abdomen is soft, nondistended, nontender, with hypoactive bowel sounds x 4.  No edema noted of the b/l lower extremities upon exam today. Skin is non-icteric.

## 2024-12-31 NOTE — ASSESSMENT & PLAN NOTE
Orders:    omeprazole (PriLOSEC) 20 mg delayed release capsule; Take 1 Pill daily in AM prior to a meal.

## 2024-12-31 NOTE — ASSESSMENT & PLAN NOTE
Since the patient is status post a colonoscopy, but is currently not showing any red flag symptoms, we will proceed with a repeat colonoscopy as recommended unless they would start to develop red flag symptoms in the future: DUE: 12/16/31     Reviewed GI red flag symptoms with patient today.

## 2025-01-07 ENCOUNTER — EVALUATION (OUTPATIENT)
Dept: PHYSICAL THERAPY | Facility: CLINIC | Age: 75
End: 2025-01-07
Payer: MEDICARE

## 2025-01-07 DIAGNOSIS — M25.462 KNEE EFFUSION, LEFT: ICD-10-CM

## 2025-01-07 DIAGNOSIS — M25.562 CHRONIC PAIN OF LEFT KNEE: ICD-10-CM

## 2025-01-07 DIAGNOSIS — G89.29 CHRONIC PAIN OF LEFT KNEE: ICD-10-CM

## 2025-01-07 DIAGNOSIS — M17.12 PRIMARY OSTEOARTHRITIS OF ONE KNEE, LEFT: Primary | ICD-10-CM

## 2025-01-07 PROCEDURE — 97110 THERAPEUTIC EXERCISES: CPT

## 2025-01-07 PROCEDURE — 97161 PT EVAL LOW COMPLEX 20 MIN: CPT

## 2025-01-07 NOTE — PROGRESS NOTES
PT Evaluation     Today's date: 2025  Patient name: Lucy Mars  : 1950  MRN: [id number]  Referring provider: Arsalan Puentes MD  Dx:   Encounter Diagnosis     ICD-10-CM    1. Primary osteoarthritis of one knee, left  M17.12 Ambulatory Referral to Physical Therapy      2. Knee effusion, left  M25.462 Ambulatory Referral to Physical Therapy      3. Chronic pain of left knee  M25.562 Ambulatory Referral to Physical Therapy    G89.29                      Assessment  Impairments: abnormal or restricted ROM, activity intolerance, impaired physical strength, lacks appropriate home exercise program and pain with function  Symptom irritability: low    Assessment details: Lucy Mars is a 74 y.o. female presenting to Physical Therapy today with signs and symptoms suggestive of primary osteoarthritis of the L knee. Upon completion of the initial PT evaluation the patient is demonstrating with limitations in L knee strength, L knee joint effusion, L knee mobility, L knee stability, and pain. Patient is currently having difficulty with activities such as stair navigation, ambulating further distances, and pivoting on her L knee. Patient would benefit from a course of skilled Physical Therapy services to address functional limitations to return to prior level of function. Thank you for your referral.   Understanding of Dx/Px/POC: excellent     Prognosis: good    Goals  STG: (6 weeks)   1.) Patient will initiate HEP Independently   2.) Patient will have a 50% reduction in overall symptoms   3.) Patient will demonstrate improved L knee strength evidenced by a 1-2 MMT grade increase  4.) Patient will demonstrate improved ability to ascend/descend a full flight of stairs with zero limitations and no pain     LTG: (12 weeks)   1.) Patient will be d/c to an HEP independently  2.) Patient will improve functional abilities evidenced by FOTO scores  3.) Eliminate pain with functional activity   4.) Patient will  demonstrate improved ability to lift, push, pull, and carry safely w/o symptoms   5.) Return to PLOF       Plan  Patient would benefit from: PT eval and skilled physical therapy  Referral necessary: No  Planned modality interventions: cryotherapy and thermotherapy: hydrocollator packs    Planned therapy interventions: functional ROM exercises, graded activity, graded exercise, graded motor, home exercise program, flexibility, neuromuscular re-education, patient education, self care, strengthening, stretching, therapeutic activities, therapeutic exercise, therapeutic training and manual therapy    Frequency: 1x week  Duration in weeks: 4  Plan of Care beginning date: 1/7/2025  Plan of Care expiration date: 2/4/2025  Treatment plan discussed with: patient  Plan details: Plan of care was reviewed and discussed thoroughly with the patient on their current condition. Patient was instructed on a HEP with written instructions. Patient is in agreement with PT recommendations and will attend Physical Therapy 1x/week for the next 4 weeks to address current deficits.        Subjective Evaluation    History of Present Illness  Mechanism of injury: The patient reports today with L sided knee pain and swelling that started labor day weekend of last year when she was placed in a CAM boot following a L Achilles Tendon repair. She presented to the ED on 11/16 and had X-ray imaging performed that revealed degenerative changes as well as L knee effusion. She states having fluid removed from her L knee at this time. She notes knee swelling returned and followed up with Dr. Puentes to have fluid removed again. She was placed into a compression L knee sleeve with great benefit to her symptoms. She is currently having difficulty with activities such as walking further distances, stair navigation, and pivoting onto her L knee. She is now referred to OPPT.           Recurrent probem    Quality of life: good    Patient Goals  Patient goals  for therapy: decreased pain, improved balance, increased strength and return to sport/leisure activities  Patient goal: Return to walking.  Pain  Current pain ratin  At best pain ratin  At worst pain ratin  Location: L lateral knee.  Quality: soreness.  Relieving factors: ice (knee compression.)  Aggravating factors: walking and stair climbing (twisting, pivoting)  Progression: improved    Social Support    Employment status: not working    Diagnostic Tests  X-ray: abnormal  Treatments  Current treatment: physical therapy      Objective     Observations   Left Knee   Positive for effusion.     Palpation   Left   Hypertonic in the distal biceps femoris, distal semimembranosus, distal semitendinosus, lateral gastrocnemius and medial gastrocnemius.     Tenderness   Left Knee   Tenderness in the lateral joint line and medial joint line.     Neurological Testing     Sensation     Knee   Left Knee   Intact: Light touch    Right Knee   Intact: light touch     Active Range of Motion   Left Knee   Flexion: 130 degrees   Extension: -2 degrees     Right Knee   Normal active range of motion    Passive Range of Motion   Left Knee   Flexion: WFL  Extension: WFL    Mobility   Patellar Mobility:   Left Knee   WFL: superior and inferior.     Patellar Static Positioning   Left Knee: WFL    Strength/Myotome Testing     Left Knee   Flexion: 4-  Extension: 4-  Quadriceps contraction: fair    Right Knee   Normal strength    Additional Strength Details  Mild Extensor Lag with performance of L active SLR.    Tests     Left Knee   Negative lateral Ghada, medial Ghada, valgus stress test at 0 degrees, valgus stress test at 30 degrees, varus stress test at 0 degrees and varus stress test at 30 degrees.     General Comments:      Knee Comments  L Hamstring Restriction: Mild  Ambulation: w/o an AD, WNL  Stair Navigation: non-reciprocally with utilization of BHR's  SLS Balance: Moderate limitation           Precautions: None at  "this time.      Manuals 1/7/25                                  Neuro Re-Ed       SLS       SLS on AE       SLS on Upside Down BOSU       Lunge onto BOSU       Ther Ex       Quad Sets  X10       SLR  x10      LAQ x10      S/L Hip ABD  X10       Hamstring Stretch  10\" Hold x10 w/ strap       Gastroc Stretch        HL Iso Hip ADD        HR/TR        Forward Step Ups        Lateral Step Ups        Mini Squats        Forward Lunges        Hip Marches        Knee Extension Machine        Knee Hamstring Curl Machine        Leg Press Machine        NU Step for muscular endurance       HEP Instruction  BM       Ther Activity                     Gait Training                     Modalities       MHP       Ice            "

## 2025-01-15 ENCOUNTER — OFFICE VISIT (OUTPATIENT)
Dept: PHYSICAL THERAPY | Facility: CLINIC | Age: 75
End: 2025-01-15
Payer: MEDICARE

## 2025-01-15 DIAGNOSIS — M17.12 PRIMARY OSTEOARTHRITIS OF ONE KNEE, LEFT: Primary | ICD-10-CM

## 2025-01-15 DIAGNOSIS — G89.29 CHRONIC PAIN OF LEFT KNEE: ICD-10-CM

## 2025-01-15 DIAGNOSIS — M25.462 KNEE EFFUSION, LEFT: ICD-10-CM

## 2025-01-15 DIAGNOSIS — M25.562 CHRONIC PAIN OF LEFT KNEE: ICD-10-CM

## 2025-01-15 PROCEDURE — 97110 THERAPEUTIC EXERCISES: CPT

## 2025-01-15 NOTE — PROGRESS NOTES
"Daily Note     Today's date: 1/15/2025  Patient name: Lucy Mars  : 1950  MRN: [id number]  Referring provider: Arsalan Puentes MD  Dx:   Encounter Diagnosis     ICD-10-CM    1. Primary osteoarthritis of one knee, left  M17.12       2. Knee effusion, left  M25.462       3. Chronic pain of left knee  M25.562     G89.29                      Subjective: Patient reports performance of home exercises are going well this far. She notes minimal increases in L knee swelling since the initial PT evaluation.      Objective: See treatment diary below      Assessment: Tolerated treatment well. We progressed the current program with the addition of aerobic exercise, L quad/hamstring strengthening, and L knee proprioceptive control exercises. Patient demonstrated good tolerance to progressions with a minimal increase in symptoms. We will look to transition patient to an independent home exercise program next session to continue managing her condition. Patient would benefit from continued PT.      Plan: Continue per plan of care.      Precautions: None at this time.      Manuals 1/7/25 1/15/25                                 Neuro Re-Ed       SLS  20\" Hold x3 L     SLS on AE       SLS on Upside Down BOSU       Lunge onto BOSU       Ther Ex       Quad Sets  X10  HEP     SLR  x10 2x10      LAQ x10 HEP      S/L Hip ABD  X10  2x10      Hamstring Stretch  10\" Hold x10 w/ strap  10\" Hold x5 8\" step      Gastroc Stretch   10\" Hold x5 w/ 1/2 foam      HL Iso Hip ADD        HR/TR   2x10      Forward Step Ups   X10 ea 8\" sep      Lateral Step Ups        Mini Squats   2x10      Forward Lunges        Hip Marches        Knee Extension Machine   P1 2x10      Knee Hamstring Curl Machine   P5 2x10      Leg Press Machine        NU Step for muscular endurance  10' L4     HEP Instruction  BM       Ther Activity                     Gait Training                     Modalities       MHP       Ice            "

## 2025-01-21 ENCOUNTER — OFFICE VISIT (OUTPATIENT)
Dept: PHYSICAL THERAPY | Facility: CLINIC | Age: 75
End: 2025-01-21
Payer: MEDICARE

## 2025-01-21 DIAGNOSIS — M17.12 PRIMARY OSTEOARTHRITIS OF ONE KNEE, LEFT: Primary | ICD-10-CM

## 2025-01-21 DIAGNOSIS — M25.462 KNEE EFFUSION, LEFT: ICD-10-CM

## 2025-01-21 DIAGNOSIS — G89.29 CHRONIC PAIN OF LEFT KNEE: ICD-10-CM

## 2025-01-21 DIAGNOSIS — M25.562 CHRONIC PAIN OF LEFT KNEE: ICD-10-CM

## 2025-01-21 PROCEDURE — 97110 THERAPEUTIC EXERCISES: CPT

## 2025-01-21 NOTE — PROGRESS NOTES
PT Discharge    Today's date: 2025  Patient name: Lucy Mars  : 1950  MRN: [id number]  Referring provider: Arsalan Puentes MD  Dx:   Encounter Diagnosis     ICD-10-CM    1. Primary osteoarthritis of one knee, left  M17.12       2. Knee effusion, left  M25.462       3. Chronic pain of left knee  M25.562     G89.29                        Assessment  Symptom irritability: low    Assessment details: The patient presents to Physical Therapy today with an overall improvement in symptomatology and function in the the L knee. Patient is happy with her progress to this point and feels that her condition is much more manageable. We will d/c patient today from Physical Therapy services and transition to an independent HEP to continue managing her condition. Patient was encouraged to call the office if she has any questions or concerns.   Understanding of Dx/Px/POC: excellent     Prognosis: good    Goals  STG: (6 weeks)   1.) Patient will initiate HEP Independently MET  2.) Patient will have a 50% reduction in overall symptoms MET  3.) Patient will demonstrate improved L knee strength evidenced by a 1-2 MMT grade increase MET  4.) Patient will demonstrate improved ability to ascend/descend a full flight of stairs with zero limitations and no pain MET    LTG: (12 weeks)   1.) Patient will be d/c to an HEP independently MET  2.) Patient will improve functional abilities evidenced by FOTO scores MET  3.) Eliminate pain with functional activity MET  4.) Patient will demonstrate improved ability to lift, push, pull, and carry safely w/o symptoms MET  5.) Return to PLOF MET      Plan    Duration in weeks: 4  Plan of Care beginning date: 2025  Plan of Care expiration date: 2025  Treatment plan discussed with: patient  Plan details: Plan of care was reviewed and discussed thoroughly with the patient on their current condition. Patient was instructed on a HEP with written instructions.         Subjective  Evaluation    History of Present Illness  Mechanism of injury: The patient reports today with L sided knee pain and swelling that started labor day weekend of last year when she was placed in a CAM boot following a L Achilles Tendon repair. She presented to the ED on  and had X-ray imaging performed that revealed degenerative changes as well as L knee effusion. She states having fluid removed from her L knee at this time. She notes knee swelling returned and followed up with Dr. Puentes to have fluid removed again. She was placed into a compression L knee sleeve with great benefit to her symptoms. She is currently having difficulty with activities such as walking further distances, stair navigation, and pivoting onto her L knee. She is now referred to OPPT.     Update 25: The patient reports today with continued improvement in L knee symptoms and function. She states leaving for [place] next month and will transition to an independent home exercise program today. She is happy with her continued progress.          Recurrent probem    Quality of life: good    Patient Goals  Patient goals for therapy: decreased pain, improved balance, increased strength and return to sport/leisure activities  Patient goal: Return to walking. Goals have been met.  Pain  Current pain ratin  At best pain ratin  At worst pain ratin  Location: L lateral knee.  Quality: soreness.  Relieving factors: ice (knee compression.)  Progression: improved    Social Support    Employment status: not working    Diagnostic Tests  X-ray: abnormal  Treatments  Current treatment: physical therapy        Objective     Palpation   Left   Hypertonic in the distal biceps femoris, distal semimembranosus, distal semitendinosus, lateral gastrocnemius and medial gastrocnemius.     Neurological Testing     Sensation     Knee   Left Knee   Intact: Light touch    Right Knee   Intact: light touch     Active Range of Motion   Left Knee   Flexion:  "WFL  Extension: WFL    Right Knee   Normal active range of motion    Passive Range of Motion   Left Knee   Flexion: WFL  Extension: WFL    Mobility   Patellar Mobility:   Left Knee   WFL: superior and inferior.     Patellar Static Positioning   Left Knee: WFL    Strength/Myotome Testing     Left Knee   Flexion: 4+  Extension: 4+  Quadriceps contraction: good    Right Knee   Normal strength    Tests     Left Knee   Negative lateral Ghada, medial Ghada, valgus stress test at 0 degrees, valgus stress test at 30 degrees, varus stress test at 0 degrees and varus stress test at 30 degrees.     General Comments:      Knee Comments  L Hamstring Restriction: Mild  Ambulation: w/o an AD, WNL  Stair Navigation: non-reciprocally with utilization of BHR's  SLS Balance: Moderate limitation             Precautions: None at this time.    Manuals 1/7/25 1/15/25 1/21/25                                Neuro Re-Ed       SLS  20\" Hold x3 L NT    SLS on AE       SLS on Upside Down BOSU       Lunge onto BOSU       Ther Ex       Quad Sets  X10  HEP     SLR  x10 2x10  2x10    LAQ x10 HEP      S/L Hip ABD  X10  2x10  2x10    Hamstring Stretch  10\" Hold x10 w/ strap  10\" Hold x5 8\" step  10\" Hold x5 8\" step     Gastroc Stretch   10\" Hold x5 w/ 1/2 foam  10\" Hold x5 w/ 1/2 foam     HL Iso Hip ADD        HR/TR   2x10  2x10    Forward Step Ups   X10 ea 8\" sep  2X10 ea 8\" step     Lateral Step Ups        Mini Squats   2x10  2x10    Forward Lunges        Hip Marches        Knee Extension Machine   P1 2x10  P1 2x10     Knee Hamstring Curl Machine   P5 2x10  P5 2x10     Leg Press Machine        NU Step for muscular endurance  10' L4 10' L4    HEP Instruction  BM       Ther Activity                     Gait Training                     Modalities       MHP       Ice              "

## 2025-03-10 ENCOUNTER — OFFICE VISIT (OUTPATIENT)
Dept: FAMILY MEDICINE CLINIC | Facility: CLINIC | Age: 75
End: 2025-03-10
Payer: MEDICARE

## 2025-03-10 VITALS
OXYGEN SATURATION: 97 % | WEIGHT: 158.8 LBS | HEART RATE: 86 BPM | DIASTOLIC BLOOD PRESSURE: 62 MMHG | SYSTOLIC BLOOD PRESSURE: 128 MMHG | BODY MASS INDEX: 28.14 KG/M2 | TEMPERATURE: 97.9 F | HEIGHT: 63 IN

## 2025-03-10 DIAGNOSIS — K21.9 GASTROESOPHAGEAL REFLUX DISEASE, UNSPECIFIED WHETHER ESOPHAGITIS PRESENT: Primary | ICD-10-CM

## 2025-03-10 DIAGNOSIS — E78.5 HYPERLIPIDEMIA, UNSPECIFIED HYPERLIPIDEMIA TYPE: ICD-10-CM

## 2025-03-10 DIAGNOSIS — Z00.00 HEALTHCARE MAINTENANCE: ICD-10-CM

## 2025-03-10 DIAGNOSIS — E55.9 VITAMIN D DEFICIENCY: ICD-10-CM

## 2025-03-10 PROCEDURE — G2211 COMPLEX E/M VISIT ADD ON: HCPCS | Performed by: PHYSICIAN ASSISTANT

## 2025-03-10 PROCEDURE — 99214 OFFICE O/P EST MOD 30 MIN: CPT | Performed by: PHYSICIAN ASSISTANT

## 2025-03-11 NOTE — PROGRESS NOTES
Name: Lucy Mars      : 1950      MRN: [id number]  Encounter Provider: Lizbeth Lemons PA-C  Encounter Date: 3/10/2025   Encounter department: [facility]  :  Assessment & Plan  Gastroesophageal reflux disease, unspecified whether esophagitis present  Well-controlled.  Continue omeprazole 20 mg once daily until follow-up with gastroenterology in May.  Orders:  •  Comprehensive metabolic panel; Future  •  CBC and differential; Future    Hyperlipidemia, unspecified hyperlipidemia type  Stable.  Continue low-fat diet and atorvastatin 10 mg daily.  Will recheck labs prior to next office visit.  Orders:  •  Comprehensive metabolic panel; Future  •  Lipid panel; Future    Vitamin D deficiency  Labs ordered to evaluate next office visit.  Continue vitamin D supplementation.  Orders:  •  Vitamin D 25 hydroxy; Future    Healthcare maintenance    Orders:  •  Comprehensive metabolic panel; Future  •  CBC and differential; Future  •  Lipid panel; Future           History of Present Illness   Exam is a very pleasant 74-year-old female who is here today for routine follow-up.  She denies any acute complaints or concerns.  She has been compliant with all of her medications.  She has a follow-up scheduled with gastroenterology.  She is scheduled for her mammogram and GYN visit.  She is up-to-date with blood work.      Review of Systems   Constitutional:  Negative for chills, diaphoresis, fatigue and fever.   HENT:  Negative for congestion, ear pain, postnasal drip, rhinorrhea, sneezing, sore throat and trouble swallowing.    Eyes:  Negative for pain and visual disturbance.   Respiratory:  Negative for apnea, cough, shortness of breath and wheezing.    Cardiovascular:  Negative for chest pain and palpitations.   Gastrointestinal:  Negative for abdominal pain, constipation, diarrhea, nausea and vomiting.   Genitourinary:  Negative for dysuria.   Musculoskeletal:  Negative for arthralgias, gait problem and  "myalgias.   Neurological:  Negative for dizziness, syncope, weakness, light-headedness, numbness and headaches.   Psychiatric/Behavioral:  Negative for suicidal ideas. The patient is not nervous/anxious.        Objective   /62   Pulse 86   Temp 97.9 °F (36.6 °C)   Ht 5' 2.5\" (1.588 m)   Wt 72 kg (158 lb 12.8 oz)   LMP  (LMP Unknown)   SpO2 97%   BMI 28.58 kg/m²      Physical Exam  Vitals and nursing note reviewed.   Constitutional:       General: She is not in acute distress.     Appearance: She is well-developed. She is not diaphoretic.   HENT:      Head: Normocephalic and atraumatic.      Right Ear: Hearing and external ear normal.      Left Ear: Hearing and external ear normal.      Nose: Nose normal. No mucosal edema or rhinorrhea.      Mouth/Throat:      Pharynx: No oropharyngeal exudate or posterior oropharyngeal erythema.   Eyes:      Extraocular Movements: Extraocular movements intact.   Cardiovascular:      Rate and Rhythm: Normal rate and regular rhythm.      Heart sounds: Normal heart sounds. No murmur heard.     No friction rub. No gallop.   Pulmonary:      Effort: Pulmonary effort is normal. No respiratory distress.      Breath sounds: Normal breath sounds. No wheezing or rales.   Musculoskeletal:         General: Normal range of motion.      Cervical back: Normal range of motion and neck supple.   Skin:     General: Skin is warm and dry.      Findings: No rash.   Neurological:      Mental Status: She is alert and oriented to person, place, and time.   Psychiatric:         Behavior: Behavior normal.         Thought Content: Thought content normal.         Judgment: Judgment normal.         "

## 2025-03-11 NOTE — ASSESSMENT & PLAN NOTE
Labs ordered to evaluate next office visit.  Continue vitamin D supplementation.  Orders:  •  Vitamin D 25 hydroxy; Future

## 2025-03-11 NOTE — ASSESSMENT & PLAN NOTE
Stable.  Continue low-fat diet and atorvastatin 10 mg daily.  Will recheck labs prior to next office visit.  Orders:  •  Comprehensive metabolic panel; Future  •  Lipid panel; Future

## 2025-03-11 NOTE — ASSESSMENT & PLAN NOTE
Well-controlled.  Continue omeprazole 20 mg once daily until follow-up with gastroenterology in May.  Orders:  •  Comprehensive metabolic panel; Future  •  CBC and differential; Future

## 2025-04-30 ENCOUNTER — OFFICE VISIT (OUTPATIENT)
Dept: GASTROENTEROLOGY | Facility: CLINIC | Age: 75
End: 2025-04-30
Payer: MEDICARE

## 2025-04-30 VITALS
SYSTOLIC BLOOD PRESSURE: 114 MMHG | TEMPERATURE: 97.9 F | HEIGHT: 63 IN | OXYGEN SATURATION: 97 % | WEIGHT: 156 LBS | HEART RATE: 101 BPM | DIASTOLIC BLOOD PRESSURE: 60 MMHG | BODY MASS INDEX: 27.64 KG/M2

## 2025-04-30 DIAGNOSIS — Z86.0100 HISTORY OF COLONIC POLYPS: ICD-10-CM

## 2025-04-30 DIAGNOSIS — K21.9 GASTROESOPHAGEAL REFLUX DISEASE, UNSPECIFIED WHETHER ESOPHAGITIS PRESENT: Primary | ICD-10-CM

## 2025-04-30 DIAGNOSIS — R10.13 EPIGASTRIC PAIN: ICD-10-CM

## 2025-04-30 PROBLEM — R10.9 ABDOMINAL PAIN: Status: RESOLVED | Noted: 2024-09-05 | Resolved: 2025-04-30

## 2025-04-30 PROCEDURE — 99214 OFFICE O/P EST MOD 30 MIN: CPT | Performed by: NURSE PRACTITIONER

## 2025-04-30 RX ORDER — PANTOPRAZOLE SODIUM 40 MG/1
40 TABLET, DELAYED RELEASE ORAL DAILY
Qty: 90 TABLET | Refills: 1 | Status: SHIPPED | OUTPATIENT
Start: 2025-04-30

## 2025-04-30 NOTE — ASSESSMENT & PLAN NOTE
We will hold off on any repeat colonoscopies until the recommended surveillance date unless the patient would start to develop red flag symptoms in the future.  The patient was agreeable and verbalized an understanding.  DUE: 12/16/31    Encouraged the patient to continue to try to drink at least 32 to 64 ounces of water daily.   Reviewed GI red flag symptoms with patient today.

## 2025-04-30 NOTE — PATIENT INSTRUCTIONS
Finish the Omeprazole.  Then, start Protonix 40 mg, 1 pill daily in AM.   Continue to avoid acidic or trigger foods as much as possible.   Continue to drink at least 32-64 oz of water as much as possible.   Continue to watch for red flag symptoms.   Schedule a f/u OV in 6 months.     We did review GI red flag symptoms, including, but not limited to: chronic nausea, vomiting, diarrhea, chills, fever, and unintentional weight loss and should call or contact our office with any changes or concerns. I reviewed with the patient that if they notice any blood while vomiting or in their stool they should contact or office or go to the nearest emergency room for immediate evaluation. The patient was agreeable and verbalized an understanding.

## 2025-04-30 NOTE — PROGRESS NOTES
Name: Lucy Mars      : 1950      MRN: [id number]  Encounter Provider: LENORE Anaya  Encounter Date: 2025   Encounter department: [facility]  :  Assessment & Plan  Gastroesophageal reflux disease, unspecified whether esophagitis present  Stable    In the hopes of continuing to de-escalate her PPI the patient is going to finish out her current supply of omeprazole 20 mg and when out of the omeprazole she will start Protonix 40 mg daily in the place of the omeprazole and see how she does.  The hope is at her next office visit we can discuss the possibility of decreasing the Protonix down to 20 mg daily and then may be even switching her to famotidine in the future and eventually just using famotidine as needed.  She is to contact our office if she experiences any side effects or issues with the changes in her medication regimen.  The patient was agreeable and verbalized an understanding.    Continue to avoid acidic or trigger foods is much as possible.  Orders:  •  pantoprazole (PROTONIX) 40 mg tablet; Take 1 tablet (40 mg total) by mouth daily    Epigastric pain  Orders:  •  pantoprazole (PROTONIX) 40 mg tablet; Take 1 tablet (40 mg total) by mouth daily    History of colonic polyps  We will hold off on any repeat colonoscopies until the recommended surveillance date unless the patient would start to develop red flag symptoms in the future.  The patient was agreeable and verbalized an understanding.  DUE: 31    Encouraged the patient to continue to try to drink at least 32 to 64 ounces of water daily.   Reviewed GI red flag symptoms with patient today.         The patient is to schedule a follow up office visit in 6 months, but understands to call or contact our offices with any issues before then or as needed.     History of Present Illness   Lucy Mars is a 74 y.o. female who presents today for a follow-up office visit regarding her chronic GERD  symptoms, epigastric pain, and history of colon polyps.  The patient reports that since her last office visit she has been able to successfully decrease her omeprazole down to just 20 mg daily without any kind of GERD symptoms or epigastric pain.  The patient would like to discuss the possibility of continuing to titrate down on the medication and both dose and strength is much as possible.    The patient currently denies any reflux, nausea, dysphagia, vomiting, decreased appetite, unplanned weight loss, or abdominal pain. Water Intake: 6 glasses per day.    The patient currently reports that they have a BM daily and reports that it is relieving, without any consistent diarrhea, constipation, straining, melena or bloody stools. [place]: 4. Last BM: This morning. Flatus: Yes.    GI Meds: Omeprazole 20 mg daily.  Daily NSAID Use: Denied    Tobacco: Denied  ETOH: Very little  Marijuana: Denied  Illicit Drug Use: Denied    Imaging: (None):     Endoscopy History: EGD: (7/14/23): Gastric polyp. Path: Normal.      COLONOSCOPY: (12/16/24): Small internal hemorrhoids, otherwise normal with a recommendation of a repeat colonoscopy in 7 years secondary to the personal history of colon polyps.     DUE: 12/16/31     HPI  History obtained from: patient  Review of Systems A complete review of systems is negative other than that noted above in the HPI.      Current Outpatient Medications   Medication Sig Dispense Refill   • pantoprazole (PROTONIX) 40 mg tablet Take 1 tablet (40 mg total) by mouth daily 90 tablet 1   • atorvastatin (LIPITOR) 10 mg tablet TAKE 1 TABLET BY MOUTH DAILY 90 tablet 1   • brimonidine (ALPHAGAN P) 0.15 % ophthalmic solution 1 drop 2 (two) times a day     • Calcium Citrate-Vitamin D (CALCIUM CITRATE + D PO) Take by mouth     • carboxymethylcellulose 0.5 % SOLN 1 drop 3 (three) times a day as needed for dry eyes     • cycloSPORINE (RESTASIS) 0.05 % ophthalmic emulsion Apply 1 drop to eye     •  "glucosamine-chondroitin 500-400 MG tablet Take 1 tablet by mouth     • mometasone (NASONEX) 50 mcg/act nasal spray 2 sprays into each nostril daily (Patient taking differently: 2 sprays into each nostril daily as needed) 17 g 3   • multivitamin (THERAGRAN) TABS Take 1 tablet by mouth     • Omega-3 Fatty Acids (FISH OIL PO) Take 2 g by mouth     • SM VITAMIN D3 4000 units CAPS Take 2,000 Units by mouth daily        No current facility-administered medications for this visit.     Objective   /60 (BP Location: Left arm, Patient Position: Sitting, Cuff Size: Standard)   Pulse 101   Temp 97.9 °F (36.6 °C) (Temporal)   Ht 5' 2.5\" (1.588 m)   Wt 70.8 kg (156 lb)   LMP  (LMP Unknown)   SpO2 97%   BMI 28.08 kg/m²     Physical Exam   Abdomen is soft, nondistended, nontender, with hypoactive bowel sounds x 4.  No edema noted of the b/l lower extremities upon exam today. Skin is non-icteric.     Lab Results: I personally reviewed relevant lab results.       Results for orders placed during the hospital encounter of 12/16/24    Colonoscopy    Narrative  Table formatting from the original result was not included.  [facility]  [address] [address] [place] [address] [phone]      DATE OF SERVICE:  12/16/24    PHYSICIAN(S):  Attending:  Alex Lux DO    Fellow:  No Staff Documented      INDICATION:  Colitis, Abdominal pain, Nausea, vomiting and diarrhea      POST-OP DIAGNOSIS:  See the impression below.    HISTORY:  Prior colonoscopy: Less than 3 years ago. It is being repeated at an interval of less than 3 years because: This colonoscopy is being performed for a diagnostic indication    BOWEL PREPARATION:  Miralax/Dulcolax      PREPROCEDURE:  Informed consent was obtained for the procedure, including sedation. Risks including but not limited to bleeding, infection, perforation, adverse drug reaction and aspiration were explained in detail. Also explained about less than 100% " sensitivity with the exam and other alternatives. The patient was placed in the left lateral decubitus position.    Procedure: Colonoscopy    DETAILS OF PROCEDURE:  Patient was taken to the procedure room where a time out was performed to confirm correct patient and correct procedure. The patient underwent monitored anesthesia care, which was administered by an anesthesia professional. The patient's blood pressure, ECG, ETCO2, heart rate, level of consciousness, oxygen and respirations were monitored throughout the procedure. A digital rectal exam was performed. A perianal exam was performed. The scope was introduced through the anus and advanced to the terminal ileum. Retroflexion was performed in the rectum. The quality of bowel preparation was evaluated using the [place] Bowel Preparation Scale with scores of: right colon = 2, transverse colon = 2, left colon = 2. The total BBPS score was 6. Bowel prep was adequate. The patient experienced no blood loss. The procedure was not difficult. The patient tolerated the procedure well. There were no apparent adverse events.      ANESTHESIA INFORMATION:  ASA: II  Anesthesia Type: Anesthesia type not filed in the log.    MEDICATIONS:  lactated ringers infusion 300 mL*  *From user-documented volume  (Totals for administrations occurring from 1047 to 1104 on 12/16/24)        FINDINGS:  Internal small hemorrhoids observed during retroflexion  The terminal ileum, ileocecal valve, appendiceal orifice, cecum, ascending colon, hepatic flexure, transverse colon, splenic flexure, descending colon, sigmoid colon and rectosigmoid appeared normal.  Performed multiple forceps biopsies in the terminal ileum to rule out IBD  Performed multiple random pancolonic forceps biopsies to rule out IBD      EVENTS:  Procedure Events  Event Event Time  ENDO CECUM REACHED 12/16/2024 10:55 AM  ENDO SCOPE OUT TIME 12/16/2024 11:04 AM      SPECIMENS:  ID Type Source Tests Collected by Time  Destination  1 : r/o ibd Tissue Terminal Ileum TISSUE EXAM Alex Lux DO 12/16/2024 10:56 AM  2 : ranom bx r/o ibd Tissue Colon TISSUE EXAM Alex Lux DO 12/16/2024 11:00 AM      EQUIPMENT:  Colonoscope -[id number]          Impression  Small hemorrhoids  The terminal ileum, ileocecal valve, appendiceal orifice, cecum, ascending colon, hepatic flexure, transverse colon, splenic flexure, descending colon, sigmoid colon and rectosigmoid appeared normal.  Performed forceps biopsies in the terminal ileum to rule out IBD  Performed pancolonic forceps biopsies to rule out IBD        RECOMMENDATION:  Await pathology results  Repeat colonoscopy in 7 years, due: 12/15/2031  Personal history of colon polyps    Follow up with GI Clinic  No evidence of colitis on endoscopic evaluation  Suspect colitis noted on CT secondary to acute infectious illness which is now resolved  Recommend repeat colonoscopy in 7 years based on previous polyp removed in 2023 dependent on overall health at that time  Could also consider deferring any further screening given age  Follow-up in the office            Alex Lux DO

## 2025-04-30 NOTE — ASSESSMENT & PLAN NOTE
Stable    In the hopes of continuing to de-escalate her PPI the patient is going to finish out her current supply of omeprazole 20 mg and when out of the omeprazole she will start Protonix 40 mg daily in the place of the omeprazole and see how she does.  The hope is at her next office visit we can discuss the possibility of decreasing the Protonix down to 20 mg daily and then may be even switching her to famotidine in the future and eventually just using famotidine as needed.  She is to contact our office if she experiences any side effects or issues with the changes in her medication regimen.  The patient was agreeable and verbalized an understanding.    Continue to avoid acidic or trigger foods is much as possible.  Orders:  •  pantoprazole (PROTONIX) 40 mg tablet; Take 1 tablet (40 mg total) by mouth daily

## 2025-05-19 DIAGNOSIS — E78.5 HYPERLIPIDEMIA, UNSPECIFIED HYPERLIPIDEMIA TYPE: ICD-10-CM

## 2025-05-19 RX ORDER — ATORVASTATIN CALCIUM 10 MG/1
10 TABLET, FILM COATED ORAL DAILY
Qty: 90 TABLET | Refills: 1 | Status: SHIPPED | OUTPATIENT
Start: 2025-05-19

## 2025-05-29 ENCOUNTER — CONSULT (OUTPATIENT)
Dept: FAMILY MEDICINE CLINIC | Facility: CLINIC | Age: 75
End: 2025-05-29
Payer: MEDICARE

## 2025-05-29 VITALS
DIASTOLIC BLOOD PRESSURE: 68 MMHG | TEMPERATURE: 98.6 F | SYSTOLIC BLOOD PRESSURE: 136 MMHG | BODY MASS INDEX: 27.93 KG/M2 | HEIGHT: 63 IN | OXYGEN SATURATION: 95 % | WEIGHT: 157.6 LBS | HEART RATE: 91 BPM

## 2025-05-29 DIAGNOSIS — J31.0 CHRONIC RHINITIS: ICD-10-CM

## 2025-05-29 DIAGNOSIS — Z01.818 PRE-OP EXAMINATION: Primary | ICD-10-CM

## 2025-05-29 DIAGNOSIS — H25.9 AGE-RELATED CATARACT OF BOTH EYES, UNSPECIFIED AGE-RELATED CATARACT TYPE: ICD-10-CM

## 2025-05-29 PROCEDURE — 99214 OFFICE O/P EST MOD 30 MIN: CPT | Performed by: PHYSICIAN ASSISTANT

## 2025-05-29 RX ORDER — OFLOXACIN 3 MG/ML
1 SOLUTION/ DROPS OPHTHALMIC 4 TIMES DAILY
COMMUNITY

## 2025-05-29 RX ORDER — PREDNISOLONE ACETATE 10 MG/ML
1 SUSPENSION/ DROPS OPHTHALMIC 4 TIMES DAILY
COMMUNITY

## 2025-05-29 RX ORDER — KETOROLAC TROMETHAMINE 5 MG/ML
1 SOLUTION OPHTHALMIC 4 TIMES DAILY
COMMUNITY

## 2025-05-29 RX ORDER — MOMETASONE FUROATE MONOHYDRATE 50 UG/1
2 SPRAY, METERED NASAL DAILY PRN
Qty: 17 G | Refills: 3 | Status: SHIPPED | OUTPATIENT
Start: 2025-05-29

## 2025-05-29 RX ORDER — OMEPRAZOLE 20 MG/1
20 CAPSULE, DELAYED RELEASE ORAL DAILY
COMMUNITY
Start: 2024-09-24

## 2025-05-29 NOTE — PROGRESS NOTES
PRE-OPERATIVE EVALUATION  [facility]    NAME: Lucy Mars  AGE: 74 y.o. SEX: female  : 1950     DATE: 2025     Assessment/Plan:     1. Pre-op examination        2. Age-related cataract of both eyes, unspecified age-related cataract type        3. Chronic rhinitis  mometasone (NASONEX) 50 mcg/act nasal spray          74 y.o. female with planned surgery: Cataract Excision.        1. Further preoperative workup as follows:   - None; no further preoperative work-up is required    2. Medication Management/Recommendations:   - None, continue medication regimen including morning of surgery, with sip of water  - Patient has been instructed to avoid herbs or non-directed vitamins the week prior to surgery to ensure no drug interactions with perioperative surgical and anesthetic medications.    3. Prophylaxis for cardiac events with perioperative beta-blockers: not indicated.    4. Patient requires further consultation with: None    Clearance  Patient is CLEARED for surgery without any additional cardiac testing.      History of Present Illness:     Lucy Mars is a 74 y.o. female who presents to the office today for a preoperative consultation.    Surgery: Cataract Excision with Intraocular Lens Implant  Anticipated Date of Surgery: OS: 6/10/2025, OD: 2025  Referring Provider: Shraddha Laureano MD    Planned anesthesia is local and IV sedation. Patient has a bleeding risk of: no recent abnormal bleeding. Patient does not have objections to receiving blood products if needed. Current anti-platelet/anti-coagulation medications that the patient is prescribed includes: None.      Assessment of Chronic Conditions:   Gastroesophageal reflux disease: Stable  Hyperlipidemia: Stable     Assessment of Cardiac Risk:  Denies unstable or severe angina or MI in the last 6 weeks or history of stent placement in the last year   Denies decompensated heart failure (e.g. New onset  heart failure, NYHA functional class IV heart failure, or worsening existing heart failure)  Denies significant arrhythmias such as high grade AV block, symptomatic ventricular arrhythmia, newly recognized ventricular tachycardia, supraventricular tachycardia with resting heart rate >100, or symptomatic bradycardia  Denies severe heart valve disease including aortic stenosis or symptomatic mitral stenosis     Duke Activity Status Index:       Prior Anesthesia Reactions: No     Personal history of venous thromboembolic disease? No    History of steroid use for >2 weeks within last year? No     Review of Systems:     Review of Systems   Constitutional:  Negative for chills, diaphoresis, fatigue and fever.   HENT:  Negative for congestion, ear pain, postnasal drip, rhinorrhea, sneezing, sore throat and trouble swallowing.    Eyes:  Positive for visual disturbance. Negative for pain.   Respiratory:  Negative for apnea, cough, shortness of breath and wheezing.    Cardiovascular:  Negative for chest pain and palpitations.   Gastrointestinal:  Negative for abdominal pain, constipation, diarrhea, nausea and vomiting.   Genitourinary:  Negative for dysuria and hematuria.   Musculoskeletal:  Negative for arthralgias, gait problem and myalgias.   Neurological:  Negative for dizziness, syncope, weakness, light-headedness, numbness and headaches.   Psychiatric/Behavioral:  Negative for suicidal ideas. The patient is not nervous/anxious.         Problem List:     Problem List[1]     Allergies:     Allergies[2]     Current Medications:     Medications[3]     Past History:     Past Medical History[4]     Past Surgical History[5]     Family History[6]     Social History     Occupational History   • Not on file   Tobacco Use   • Smoking status: Never   • Smokeless tobacco: Never   Vaping Use   • Vaping status: Never Used   Substance and Sexual Activity   • Alcohol use: Yes     Alcohol/week: 2.0 standard drinks of alcohol     Types: 1  "Glasses of wine, 1 Cans of beer per week     Comment: social   • Drug use: Never   • Sexual activity: Not Currently     Partners: Male     Birth control/protection: Female Sterilization     Comment: hysterectomy        Physical Exam:      /68   Pulse 91   Temp 98.6 °F (37 °C)   Ht 5' 2.5\" (1.588 m)   Wt 71.5 kg (157 lb 9.6 oz)   LMP  (LMP Unknown)   SpO2 95%   BMI 28.37 kg/m²     Physical Exam  Vitals and nursing note reviewed.   Constitutional:       General: She is not in acute distress.     Appearance: She is well-developed. She is not diaphoretic.   HENT:      Head: Normocephalic and atraumatic.      Right Ear: Hearing, tympanic membrane, ear canal and external ear normal.      Left Ear: Hearing, tympanic membrane, ear canal and external ear normal.      Nose: Nose normal. No mucosal edema or rhinorrhea.      Mouth/Throat:      Pharynx: No oropharyngeal exudate or posterior oropharyngeal erythema.     Eyes:      Extraocular Movements: Extraocular movements intact.       Cardiovascular:      Rate and Rhythm: Normal rate and regular rhythm.      Heart sounds: Normal heart sounds. No murmur heard.     No friction rub. No gallop.   Pulmonary:      Effort: Pulmonary effort is normal. No respiratory distress.      Breath sounds: Normal breath sounds. No wheezing or rales.   Abdominal:      General: Bowel sounds are normal. There is no distension.      Palpations: Abdomen is soft.      Tenderness: There is no abdominal tenderness. There is no guarding.     Musculoskeletal:         General: Normal range of motion.      Cervical back: Normal range of motion and neck supple.     Skin:     General: Skin is warm and dry.      Findings: No rash.     Neurological:      Mental Status: She is alert and oriented to person, place, and time.     Psychiatric:         Behavior: Behavior normal.         Thought Content: Thought content normal.         Judgment: Judgment normal.         Pre-operative work-up:  Laboratory " Results: N/A  EKG: N/A  Chest x-ray: N/A    Lizbeth Lemons PA-C  [facility]  [address] [facility] [address]  Phone#  [phone]  Fax#  [phone]             [1]  Patient Active Problem List  Diagnosis   • Nephrolithiasis   • Hyperlipidemia   • Epigastric pain   • History of colonic polyps   • Vitamin D deficiency   • Gastroesophageal reflux disease   [2]  Allergies  Allergen Reactions   • Cefaclor Other (See Comments)     swelling and itching of hands and feet   • Cephalosporins Itching   • Prednisone Other (See Comments)     disconnected feeling   [3]  Current Outpatient Medications on File Prior to Visit   Medication Sig   • atorvastatin (LIPITOR) 10 mg tablet TAKE 1 TABLET BY MOUTH DAILY   • brimonidine (ALPHAGAN P) 0.15 % ophthalmic solution 1 drop in the morning and 1 drop in the evening.   • Calcium Citrate-Vitamin D (CALCIUM CITRATE + D PO) Take by mouth   • carboxymethylcellulose 0.5 % SOLN 1 drop as needed in the morning and 1 drop as needed at noon and 1 drop as needed in the evening for dry eyes.   • cycloSPORINE (RESTASIS) 0.05 % ophthalmic emulsion Apply 1 drop to eye   • glucosamine-chondroitin 500-400 MG tablet Take 1 tablet by mouth   • ketorolac (ACULAR) 0.5 % ophthalmic solution Administer 1 drop to both eyes 4 (four) times a day   • multivitamin (THERAGRAN) TABS Take 1 tablet by mouth   • ofloxacin (OCUFLOX) 0.3 % ophthalmic solution Administer 1 drop into the left eye 4 (four) times a day   • Omega-3 Fatty Acids (FISH OIL PO) Take 2 g by mouth   • omeprazole (PriLOSEC) 20 mg delayed release capsule Take 20 mg by mouth daily   • prednisoLONE acetate (PRED FORTE) 1 % ophthalmic suspension Administer 1 drop into the left eye 4 (four) times a day   • SM VITAMIN D3 4000 units CAPS Take 2,000 Units by mouth in the morning.   • [DISCONTINUED] mometasone (NASONEX) 50 mcg/act nasal spray 2 sprays into each nostril daily   • [DISCONTINUED] pantoprazole (PROTONIX) 40 mg tablet Take 1  tablet (40 mg total) by mouth daily (Patient not taking: Reported on 2025)   [4]  Past Medical History:  Diagnosis Date   • Achilles tendon tear ,    bilateral   • Allergic rhinitis due to pollen    • Anxiety    • Cancer (HCC)    • Colon polyp    • Depression    • Endometrial cancer (HCC)    • Fatty liver    • History of rectal bleeding 2023   • HPV (human papilloma virus) infection    • Hyperlipidemia    • Kidney stone    • Kidney stones     renal calculi   • Osteoporosis    • Otitis media Childhood    Childhood    • UTI (urinary tract infection)    [5]  Past Surgical History:  Procedure Laterality Date   •  SECTION  ,   • COLONOSCOPY     • COLONOSCOPY     • DILATION AND CURETTAGE OF UTERUS     • DXA PROCEDURE (HISTORICAL)     • HYSTERECTOMY Bilateral     salpino-ooph   • OOPHORECTOMY Bilateral    • TENDON REPAIR      Gerhard tendon repair-bilateral   • TUBAL LIGATION  10/02/1979   • UPPER GASTROINTESTINAL ENDOSCOPY     [6]  Family History  Adopted: Yes   Problem Relation Name Age of Onset   • No Known Problems Mother     • No Known Problems Father     • Breast cancer Daughter Jacquelyn 36   • No Known Problems Maternal Grandmother     • No Known Problems Maternal Grandfather     • No Known Problems Paternal Grandmother     • No Known Problems Paternal Grandfather     • Parkinsonism Other Fam hx    • Seizures Other Fam hx    • Breast cancer Cousin P         Daughter   • Colon polyps Brother Cecil            • Cancer Sister Daughter- breast cancer    • Neurological problems Brother Cecil          Epilepsy, Parkinson’s disease, dementia   • Breast cancer additional onset Neg Hx     • BRCA2 Negative Neg Hx     • BRCA2 Positive Neg Hx     • BRCA1 Positive Neg Hx     • BRCA1 Negative Neg Hx     • BRCA 1/2 Neg Hx     • Ovarian cancer Neg Hx     • Endometrial cancer Neg Hx     • Colon cancer Neg Hx